# Patient Record
Sex: FEMALE | Race: WHITE | NOT HISPANIC OR LATINO | Employment: OTHER | URBAN - METROPOLITAN AREA
[De-identification: names, ages, dates, MRNs, and addresses within clinical notes are randomized per-mention and may not be internally consistent; named-entity substitution may affect disease eponyms.]

---

## 2017-01-03 ENCOUNTER — COMMUNICATION - HEALTHEAST (OUTPATIENT)
Dept: PALLIATIVE MEDICINE | Facility: OTHER | Age: 57
End: 2017-01-03

## 2017-01-03 DIAGNOSIS — G89.4 CHRONIC PAIN SYNDROME: ICD-10-CM

## 2017-01-27 ENCOUNTER — OFFICE VISIT (OUTPATIENT)
Dept: PSYCHIATRY | Facility: CLINIC | Age: 57
End: 2017-01-27
Attending: PSYCHIATRY & NEUROLOGY
Payer: MEDICARE

## 2017-01-27 VITALS
HEART RATE: 83 BPM | BODY MASS INDEX: 35.17 KG/M2 | DIASTOLIC BLOOD PRESSURE: 83 MMHG | WEIGHT: 221.2 LBS | SYSTOLIC BLOOD PRESSURE: 143 MMHG

## 2017-01-27 DIAGNOSIS — F06.30 MOOD DISORDER IN CONDITIONS CLASSIFIED ELSEWHERE: ICD-10-CM

## 2017-01-27 DIAGNOSIS — G47.00 INSOMNIA, UNSPECIFIED: Primary | ICD-10-CM

## 2017-01-27 PROCEDURE — 99212 OFFICE O/P EST SF 10 MIN: CPT | Mod: ZF

## 2017-01-27 NOTE — NURSING NOTE
Chief Complaint   Patient presents with     Recheck Medication     Major depressive disorder     Reviewed allergies, smoking status, and pharmacy preference  Administered abuse screening questions   Obtained weight, blood pressure and heart rate
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
## 2017-02-13 ENCOUNTER — COMMUNICATION - HEALTHEAST (OUTPATIENT)
Dept: PALLIATIVE MEDICINE | Facility: OTHER | Age: 57
End: 2017-02-13

## 2017-02-13 DIAGNOSIS — G89.4 CHRONIC PAIN SYNDROME: ICD-10-CM

## 2017-02-21 ENCOUNTER — HOSPITAL ENCOUNTER (OUTPATIENT)
Dept: PALLIATIVE MEDICINE | Facility: OTHER | Age: 57
Discharge: HOME | End: 2017-02-21
Attending: PHYSICIAN ASSISTANT

## 2017-02-21 DIAGNOSIS — M50.30 DEGENERATION OF CERVICAL INTERVERTEBRAL DISC: ICD-10-CM

## 2017-02-21 DIAGNOSIS — G89.4 CHRONIC PAIN SYNDROME: ICD-10-CM

## 2017-02-21 DIAGNOSIS — M51.379 DEGENERATION OF LUMBAR OR LUMBOSACRAL INTERVERTEBRAL DISC: ICD-10-CM

## 2017-02-21 DIAGNOSIS — Z79.899 MEDICAL CANNABIS USE: ICD-10-CM

## 2017-02-21 DIAGNOSIS — F11.20 OPIOID TYPE DEPENDENCE, CONTINUOUS (H): ICD-10-CM

## 2017-02-21 ASSESSMENT — MIFFLIN-ST. JEOR: SCORE: 1569.46

## 2017-03-09 ENCOUNTER — TELEPHONE (OUTPATIENT)
Dept: PSYCHIATRY | Facility: CLINIC | Age: 57
End: 2017-03-09

## 2017-03-09 DIAGNOSIS — G47.00 INSOMNIA, UNSPECIFIED: ICD-10-CM

## 2017-03-09 NOTE — TELEPHONE ENCOUNTER
Last appt:  1/27/17  RTC: 3 mos  Can: 4/28/17 & 4/21/17 (cancelled by provider)  No show: none  Pen: 5/5/17    Rec'd refill request for Ambien 10 mg QHS PRN from [facility], 90 d/s last filled 12/13/16.    Msg routed to provider for auth to refill a 90 d/s of this medication.

## 2017-03-10 RX ORDER — ZOLPIDEM TARTRATE 10 MG/1
10 TABLET ORAL
Qty: 90 TABLET | Refills: 0
Start: 2017-03-10 | End: 2017-08-04

## 2017-03-10 NOTE — TELEPHONE ENCOUNTER
Dieter Gayle MD Bove, Michelle, RN        Caller: Unspecified (Yesterday,  5:25 PM)                      Yes, OK to refill Ambien for 90 d/s.  Thanks.

## 2017-03-29 ENCOUNTER — COMMUNICATION - HEALTHEAST (OUTPATIENT)
Dept: PALLIATIVE MEDICINE | Facility: OTHER | Age: 57
End: 2017-03-29

## 2017-03-29 DIAGNOSIS — G89.4 CHRONIC PAIN SYNDROME: ICD-10-CM

## 2017-04-19 ENCOUNTER — HOSPITAL ENCOUNTER (OUTPATIENT)
Dept: PALLIATIVE MEDICINE | Facility: OTHER | Age: 57
Discharge: HOME | End: 2017-04-19
Attending: PHYSICIAN ASSISTANT

## 2017-04-19 DIAGNOSIS — G47.01 INSOMNIA SECONDARY TO CHRONIC PAIN: ICD-10-CM

## 2017-04-19 DIAGNOSIS — G89.4 CHRONIC PAIN SYNDROME: ICD-10-CM

## 2017-04-19 DIAGNOSIS — G03.9 ARACHNOIDITIS: ICD-10-CM

## 2017-04-19 DIAGNOSIS — F11.20 OPIOID TYPE DEPENDENCE, CONTINUOUS (H): ICD-10-CM

## 2017-04-19 DIAGNOSIS — Z79.899 MEDICAL CANNABIS USE: ICD-10-CM

## 2017-04-19 DIAGNOSIS — G89.29 INSOMNIA SECONDARY TO CHRONIC PAIN: ICD-10-CM

## 2017-04-19 ASSESSMENT — MIFFLIN-ST. JEOR: SCORE: 1569.46

## 2017-05-05 ENCOUNTER — OFFICE VISIT (OUTPATIENT)
Dept: PSYCHIATRY | Facility: CLINIC | Age: 57
End: 2017-05-05
Attending: PSYCHIATRY & NEUROLOGY
Payer: MEDICARE

## 2017-05-05 VITALS
DIASTOLIC BLOOD PRESSURE: 72 MMHG | BODY MASS INDEX: 34.53 KG/M2 | WEIGHT: 217.2 LBS | SYSTOLIC BLOOD PRESSURE: 138 MMHG | HEART RATE: 71 BPM

## 2017-05-05 DIAGNOSIS — F33.42 MAJOR DEPRESSIVE DISORDER, RECURRENT, IN FULL REMISSION (H): Primary | ICD-10-CM

## 2017-05-05 DIAGNOSIS — G47.00 INSOMNIA, UNSPECIFIED: ICD-10-CM

## 2017-05-05 PROCEDURE — 99212 OFFICE O/P EST SF 10 MIN: CPT | Mod: ZF

## 2017-05-05 RX ORDER — TRAZODONE HYDROCHLORIDE 50 MG/1
50-150 TABLET, FILM COATED ORAL
Qty: 270 TABLET | Refills: 3 | Status: SHIPPED | OUTPATIENT
Start: 2017-05-05 | End: 2017-10-24

## 2017-05-05 RX ORDER — SERTRALINE HYDROCHLORIDE 100 MG/1
200 TABLET, FILM COATED ORAL DAILY
Qty: 180 TABLET | Refills: 1 | Status: SHIPPED | OUTPATIENT
Start: 2017-05-05 | End: 2017-11-09
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
## 2017-05-05 NOTE — MR AVS SNAPSHOT
After Visit Summary   5/5/2017    Keshia Arellano    MRN: [id number]           Patient Information     Date Of Birth          1960        Visit Information        Provider Department      5/5/2017 3:40 PM Dieter Gayle MD Psychiatry Clinic        Today's Diagnoses     Major depressive disorder, recurrent, mild    -  1    Insomnia, unspecified           Follow-ups after your visit        Follow-up notes from your care team     Return in about 3 months (around 8/5/2017).      Who to contact     Please call your clinic at [phone] to:    Ask questions about your health    Make or cancel appointments    Discuss your medicines    Learn about your test results    Speak to your doctor   If you have compliments or concerns about an experience at your clinic, or if you wish to file a complaint, please contact [facility] Patient Relations at [phone] or email us at [email or web address]         Additional Information About Your Visit        MyChart Information     F[employer] gives you secure access to your electronic health record. If you see a primary care provider, you can also send messages to your care team and make appointments. If you have questions, please call your primary care clinic.  If you do not have a primary care provider, please call [phone] and they will assist you.      [employer] is an electronic gateway that provides easy, online access to your medical records. With [employer], you can request a clinic appointment, read your test results, renew a prescription or communicate with your care team.     To access your existing account, please contact your [facility] or call [phone] for assistance.        Care EveryWhere ID     This is your Care EveryWhere ID. This could be used by other organizations to access your [place] medical records  [id number]        Your Vitals Were     Pulse BMI (Body Mass Index)                 71 34.53 kg/m2           Blood Pressure from Last 3 Encounters:   05/05/17 138/72   01/27/17 143/83   07/22/16 153/80    Weight from Last 3 Encounters:   05/05/17 98.5 kg (217 lb 3.2 oz)   01/27/17 100.3 kg (221 lb 3.2 oz)   07/22/16 107.1 kg (236 lb 3.2 oz)              Today, you had the following     No orders found for display         Today's Medication Changes          These changes are accurate as of: 5/5/17 11:59 PM.  If you have any questions, ask your nurse or doctor.               These medicines have changed or have updated prescriptions.        Dose/Directions    traZODone 50 MG tablet   Commonly known as:  DESYREL   This may have changed:    - how much to take  - when to take this  - reasons to take this   Used for:  Insomnia, unspecified   Changed by:  Dieter Gayle MD        Dose:   mg   Take 1-3 tablets ( mg) by mouth nightly as needed for sleep   Quantity:  270 tablet   Refills:  3         Stop taking these medicines if you haven't already. Please contact your care team if you have questions.     FLEXERIL 10 MG tablet   Generic drug:  cyclobenzaprine   Stopped by:  Dieter Gayle MD                Where to get your medicines      These medications were sent to [facility] - [place], [place] - [address] [facility] [address]  [address] [facility] [address], [place] [address]     Phone:  [phone]     sertraline 100 MG tablet    traZODone 50 MG tablet                Primary Care Provider Office Phone # Fax #    Janie Farr [phone]       [facility] [address]  [facility] [address]        Thank you!     Thank you for choosing PSYCHIATRY CLINIC  for your care. Our goal is always to provide you with excellent care. Hearing back from our patients is one way we can continue to improve our services. Please take a few minutes to complete the written survey that you may receive in the mail after your visit  with us. Thank you!             Your Updated Medication List - Protect others around you: Learn how to safely use, store and throw away your medicines at www.disposemymeds.org.          This list is accurate as of: 5/5/17 11:59 PM.  Always use your most recent med list.                   Brand Name Dispense Instructions for use    ACIDOPHILUS PO      1 tablet daily       baclofen 10 MG tablet    LIORESAL    270 tablet    Take 1 tablet (10 mg) by mouth 3 times daily as needed for muscle spasms       CHLORTHALIDONE PO      Take 12.5 mg by mouth daily       Cholecalciferol 4000 UNITS Tabs      Take 4,000 Units by mouth daily.       fish oil-omega-3 fatty acids 1000 MG capsule      Take 2 g by mouth daily.       LISINOPRIL PO      Take 5 mg by mouth daily       * morphine 15 MG 12 hr tablet    MS CONTIN     Take 15 mg by mouth nightly as needed.       * morphine 15 MG IR tablet    MSIR    1 tablet    Take 0.5-1 tablets (7.5-15 mg) by mouth 4 times daily as needed       order for DME     1 Box    Equipment being ordered: Full spectrum 10,000 lux light box (Procedure code ): use 30 min every morning and afternoon in fall and winter months.       * other medical supplies      BluLight Therapy       priLOSEC 20 MG CR capsule   Generic drug:  omeprazole      1 CAPSULE DAILY       sertraline 100 MG tablet    ZOLOFT    180 tablet    Take 2 tablets (200 mg) by mouth daily       traZODone 50 MG tablet    DESYREL    270 tablet    Take 1-3 tablets ( mg) by mouth nightly as needed for sleep       * UNABLE TO FIND      MEDICATION NAME: omega-6       vitamin D 09241 UNIT capsule    ERGOCALCIFEROL    4 capsule    Take 1 capsule by mouth every 7 days.       zolpidem 10 MG tablet    AMBIEN    90 tablet    Take 1 tablet (10 mg) by mouth nightly as needed       * Notice:  This list has 4 medication(s) that are the same as other medications prescribed for you. Read the directions carefully, and ask your doctor or other care  provider to review them with you.

## 2017-05-09 ASSESSMENT — PATIENT HEALTH QUESTIONNAIRE - PHQ9: SUM OF ALL RESPONSES TO PHQ QUESTIONS 1-9: 14

## 2017-06-02 ENCOUNTER — COMMUNICATION - HEALTHEAST (OUTPATIENT)
Dept: PALLIATIVE MEDICINE | Facility: OTHER | Age: 57
End: 2017-06-02

## 2017-06-02 DIAGNOSIS — G89.4 CHRONIC PAIN SYNDROME: ICD-10-CM

## 2017-06-14 ENCOUNTER — HOSPITAL ENCOUNTER (OUTPATIENT)
Dept: PALLIATIVE MEDICINE | Facility: OTHER | Age: 57
Discharge: HOME | End: 2017-06-14
Attending: PHYSICIAN ASSISTANT

## 2017-06-14 DIAGNOSIS — M50.30 DEGENERATION OF CERVICAL INTERVERTEBRAL DISC: ICD-10-CM

## 2017-06-14 DIAGNOSIS — G03.9 ARACHNOIDITIS: ICD-10-CM

## 2017-06-14 DIAGNOSIS — F11.20 OPIOID TYPE DEPENDENCE, CONTINUOUS (H): ICD-10-CM

## 2017-06-14 DIAGNOSIS — G89.4 CHRONIC PAIN SYNDROME: ICD-10-CM

## 2017-06-14 ASSESSMENT — MIFFLIN-ST. JEOR: SCORE: 1552.45

## 2017-06-25 ENCOUNTER — TELEPHONE (OUTPATIENT)
Dept: PSYCHIATRY | Facility: CLINIC | Age: 57
End: 2017-06-25

## 2017-06-25 NOTE — TELEPHONE ENCOUNTER
"On 6/6/2017 the patient signed an SASHA authorizing the release of medical recors (\"excluding Psychotherapy notes) from [facility] to [employer].  I sent the document to Medical Records via the [employer] system on 6/25-26 2017.Crissy Brunner/ANDRES    "

## 2017-07-31 ENCOUNTER — RECORDS - HEALTHEAST (OUTPATIENT)
Dept: ADMINISTRATIVE | Facility: OTHER | Age: 57
End: 2017-07-31

## 2017-08-04 ENCOUNTER — OFFICE VISIT (OUTPATIENT)
Dept: PSYCHIATRY | Facility: CLINIC | Age: 57
End: 2017-08-04
Attending: PSYCHIATRY & NEUROLOGY
Payer: MEDICARE

## 2017-08-04 VITALS
HEART RATE: 76 BPM | BODY MASS INDEX: 35.01 KG/M2 | WEIGHT: 220.2 LBS | DIASTOLIC BLOOD PRESSURE: 80 MMHG | SYSTOLIC BLOOD PRESSURE: 145 MMHG

## 2017-08-04 DIAGNOSIS — F33.41 MAJOR DEPRESSIVE DISORDER, RECURRENT EPISODE, IN PARTIAL REMISSION (H): Primary | ICD-10-CM

## 2017-08-04 PROCEDURE — 99212 OFFICE O/P EST SF 10 MIN: CPT | Mod: ZF

## 2017-08-04 NOTE — MR AVS SNAPSHOT
After Visit Summary   8/4/2017    Keshia Arellano    MRN: [id number]           Patient Information     Date Of Birth          1960        Visit Information        Provider Department      8/4/2017 2:45 PM Dieter Gayle MD Psychiatry Clinic        Today's Diagnoses     Major depressive disorder, recurrent episode, in partial remission (H)    -  1       Follow-ups after your visit        Follow-up notes from your care team     Return in about 3 months (around 11/4/2017).      Your next 10 appointments already scheduled     Nov 09, 2017  1:15 PM [facility]   Adult Med Follow UP with Dieter Gayle MD   Psychiatry Clinic ([facility])    [address]  [address] [facility] [id number] [phone]              Who to contact     Please call your clinic at [phone] to:    Ask questions about your health    Make or cancel appointments    Discuss your medicines    Learn about your test results    Speak to your doctor   If you have compliments or concerns about an experience at your clinic, or if you wish to file a complaint, please contact [facility] Patient Relations at [phone] or email us at [email or web address]         Additional Information About Your Visit        MyChart Information     [employer] gives you secure access to your electronic health record. If you see a primary care provider, you can also send messages to your care team and make appointments. If you have questions, please call your primary care clinic.  If you do not have a primary care provider, please call [phone] and they will assist you.      [employer] is an electronic gateway that provides easy, online access to your medical records. With [employer], you can request a clinic appointment, read your test results, renew a prescription or communicate with your care team.     To access your existing account, please contact your [facility]  [place] [facility] or call [phone] for assistance.        Care EveryWhere ID     This is your Care EveryWhere ID. This could be used by other organizations to access your [place] medical records  [id number]        Your Vitals Were     Pulse BMI (Body Mass Index)                76 35.01 kg/m2           Blood Pressure from Last 3 Encounters:   08/04/17 145/80   05/05/17 138/72   01/27/17 143/83    Weight from Last 3 Encounters:   08/04/17 99.9 kg (220 lb 3.2 oz)   05/05/17 98.5 kg (217 lb 3.2 oz)   01/27/17 100.3 kg (221 lb 3.2 oz)              Today, you had the following     No orders found for display         Today's Medication Changes          These changes are accurate as of: 8/4/17 11:59 PM.  If you have any questions, ask your nurse or doctor.               Stop taking these medicines if you haven't already. Please contact your care team if you have questions.     zolpidem 10 MG tablet   Commonly known as:  AMBIEN   Stopped by:  Dieter Gayle MD                    Primary Care Provider Office Phone # Fax #    Janie Melanie Farr [phone]       [facility] [address]  [facility] [address]        Equal Access to Services     YENNY AMBRIZ AH: Hadii aad ku hadasho Soomaali, waaxda luqadaha, qaybta kaalmada adeegyada, waxay medinain hayemil sims. So [facility] [phone].    ATENCIÓN: Si habla español, tiene a cotton disposición servicios gratuitos de asistencia lingüística. Llame al [phone].    We comply with applicable federal civil rights laws and [place] laws. We do not discriminate on the basis of race, color, national origin, age, disability sex, sexual orientation or gender identity.            Thank you!     Thank you for choosing PSYCHIATRY CLINIC  for your care. Our goal is always to provide you with excellent care. Hearing back from our patients is one way we can continue to improve our services. Please take a few minutes to  complete the written survey that you may receive in the mail after your visit with us. Thank you!             Your Updated Medication List - Protect others around you: Learn how to safely use, store and throw away your medicines at www.disposemymeds.org.          This list is accurate as of: 8/4/17 11:59 PM.  Always use your most recent med list.                   Brand Name Dispense Instructions for use Diagnosis    ACIDOPHILUS PO      1 tablet daily        baclofen 10 MG tablet    LIORESAL    270 tablet    Take 1 tablet (10 mg) by mouth 3 times daily as needed for muscle spasms    Chronic pain       CHLORTHALIDONE PO      Take 12.5 mg by mouth daily        Cholecalciferol 4000 UNITS Tabs      Take 4,000 Units by mouth daily.        fish oil-omega-3 fatty acids 1000 MG capsule      Take 2 g by mouth daily.        LISINOPRIL PO      Take 5 mg by mouth daily        * morphine 15 MG 12 hr tablet    MS CONTIN     Take 15 mg by mouth nightly as needed.        * morphine 15 MG IR tablet    MSIR    1 tablet    Take 0.5-1 tablets (7.5-15 mg) by mouth 4 times daily as needed    Chronic pain       order for DME     1 Box    Equipment being ordered: Full spectrum 10,000 lux light box (Procedure code ): use 30 min every morning and afternoon in fall and winter months.    Seasonal affective disorder (H)       * other medical supplies      BluLight Therapy        priLOSEC 20 MG CR capsule   Generic drug:  omeprazole      1 CAPSULE DAILY        sertraline 100 MG tablet    ZOLOFT    180 tablet    Take 2 tablets (200 mg) by mouth daily    Major depressive disorder, recurrent, in full remission (H)       traZODone 50 MG tablet    DESYREL    270 tablet    Take 1-3 tablets ( mg) by mouth nightly as needed for sleep    Insomnia, unspecified       * UNABLE TO FIND      MEDICATION NAME: omega-6        vitamin D 75539 UNIT capsule    ERGOCALCIFEROL    4 capsule    Take 1 capsule by mouth every 7 days.    Vitamin D deficiency        * Notice:  This list has 4 medication(s) that are the same as other medications prescribed for you. Read the directions carefully, and ask your doctor or other care provider to review them with you.
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
## 2017-08-07 ASSESSMENT — PATIENT HEALTH QUESTIONNAIRE - PHQ9: SUM OF ALL RESPONSES TO PHQ QUESTIONS 1-9: 13

## 2017-08-09 ENCOUNTER — HOSPITAL ENCOUNTER (OUTPATIENT)
Dept: PALLIATIVE MEDICINE | Facility: OTHER | Age: 57
Discharge: HOME | End: 2017-08-09
Attending: PHYSICIAN ASSISTANT

## 2017-08-09 DIAGNOSIS — M25.50 PAIN IN JOINT, MULTIPLE SITES: ICD-10-CM

## 2017-08-09 DIAGNOSIS — F11.20 OPIOID TYPE DEPENDENCE, CONTINUOUS (H): ICD-10-CM

## 2017-08-09 DIAGNOSIS — Z79.899 MEDICAL CANNABIS USE: ICD-10-CM

## 2017-08-09 DIAGNOSIS — G03.9 ARACHNOIDITIS: ICD-10-CM

## 2017-08-09 DIAGNOSIS — G89.4 CHRONIC PAIN SYNDROME: ICD-10-CM

## 2017-08-09 ASSESSMENT — MIFFLIN-ST. JEOR: SCORE: 1556.98

## 2017-08-17 ENCOUNTER — TELEPHONE (OUTPATIENT)
Dept: PSYCHIATRY | Facility: CLINIC | Age: 57
End: 2017-08-17

## 2017-08-17 NOTE — TELEPHONE ENCOUNTER
"Last appt: 8/4/17  Pend: 11/9/17    Writer does not see SASHA on file for Dr. Ibrahim.  Uncertain where he is calling from, possibly with insurance company.    Writer attempted to reach Dr. Ibrahim at phone below however spoke with .  Was informed that Dr. Ibrahim is out of clinic today however will be in \"all day\" tomorrow.  Inquired about Dr. Ibrahim's relation to pt and was informed that he is a rehab doctor in private practice that is performing an independent review on patient.  Clarified that Dr. Gaspar is not the treating provider and that Dr. Gayle is currently seeing this pt.  Writer stated would forward msg to Dr. Gayle.  "

## 2017-08-17 NOTE — TELEPHONE ENCOUNTER
----- Message from Sharlene Mauro sent at 8/17/2017 11:08 AM CDT -----  Irwin/Annie Ibrahim is caller. He asked to speak to Dr Gaspar. He would like to talk about pt's functionality.     [phone]

## 2017-08-24 NOTE — TELEPHONE ENCOUNTER
Manasa Ramirez MD Bove, Michelle, RN        Caller: Unspecified (1 week ago,  3:04 PM)                     Barry Valencia - Can you (or someone) call this person back and let them know Dr. Gayle is out for a few weeks. They can try to connect after Dr. Gayle is back.     Thanks,   Manasa Jimenez'd faxed letter from Dr. Ibrahim on behalf of [facility] regarding pt's disability claim.  He requests provider's review of his recommendations and signature on attached letter.    Called Dr. Ibrahim's office with update that Dr. Gayle is out of clinic until 9/12    Routed to Dr. Gayle as KARLOS

## 2017-09-12 NOTE — TELEPHONE ENCOUNTER
Dieter Gayle MD Bove, Michelle, RN        Caller: Unspecified (3 weeks ago)                     I signed the letter and faxed it back today.  Thanks.

## 2017-09-14 ENCOUNTER — COMMUNICATION - HEALTHEAST (OUTPATIENT)
Dept: PALLIATIVE MEDICINE | Facility: CLINIC | Age: 57
End: 2017-09-14

## 2017-09-14 DIAGNOSIS — G89.4 CHRONIC PAIN SYNDROME: ICD-10-CM

## 2017-10-04 ENCOUNTER — HOSPITAL ENCOUNTER (OUTPATIENT)
Dept: PALLIATIVE MEDICINE | Facility: OTHER | Age: 57
Discharge: HOME | End: 2017-10-04
Attending: PHYSICIAN ASSISTANT

## 2017-10-04 DIAGNOSIS — F11.20 OPIOID TYPE DEPENDENCE, CONTINUOUS (H): ICD-10-CM

## 2017-10-04 DIAGNOSIS — Z79.899 MEDICAL CANNABIS USE: ICD-10-CM

## 2017-10-04 DIAGNOSIS — G03.9 ARACHNOIDITIS: ICD-10-CM

## 2017-10-04 DIAGNOSIS — M50.30 DEGENERATION OF CERVICAL INTERVERTEBRAL DISC: ICD-10-CM

## 2017-10-04 DIAGNOSIS — G89.4 CHRONIC PAIN SYNDROME: ICD-10-CM

## 2017-10-04 ASSESSMENT — MIFFLIN-ST. JEOR: SCORE: 1552.45

## 2017-11-09 ENCOUNTER — OFFICE VISIT (OUTPATIENT)
Dept: PSYCHIATRY | Facility: CLINIC | Age: 57
End: 2017-11-09
Attending: PSYCHIATRY & NEUROLOGY
Payer: MEDICARE

## 2017-11-09 VITALS
SYSTOLIC BLOOD PRESSURE: 127 MMHG | WEIGHT: 211 LBS | BODY MASS INDEX: 33.55 KG/M2 | DIASTOLIC BLOOD PRESSURE: 80 MMHG | HEART RATE: 73 BPM

## 2017-11-09 DIAGNOSIS — F33.42 MAJOR DEPRESSIVE DISORDER, RECURRENT, IN FULL REMISSION (H): Primary | ICD-10-CM

## 2017-11-09 PROCEDURE — 99212 OFFICE O/P EST SF 10 MIN: CPT | Mod: ZF

## 2017-11-09 RX ORDER — SERTRALINE HYDROCHLORIDE 100 MG/1
200 TABLET, FILM COATED ORAL DAILY
Qty: 180 TABLET | Refills: 1 | Status: SHIPPED | OUTPATIENT
Start: 2017-11-09 | End: 2018-02-01 | Stop reason: DRUGHIGH

## 2017-11-09 ASSESSMENT — PATIENT HEALTH QUESTIONNAIRE - PHQ9: SUM OF ALL RESPONSES TO PHQ QUESTIONS 1-9: 13
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
## 2017-11-09 NOTE — MR AVS SNAPSHOT
After Visit Summary   11/9/2017    Keshia Arellano    MRN: [id number]           Patient Information     Date Of Birth          1960        Visit Information        Provider Department      11/9/2017 1:15 PM Dieter Gayle MD Psychiatry Clinic        Today's Diagnoses     Major depressive disorder, recurrent, mild    -  1       Follow-ups after your visit        Your next 10 appointments already scheduled     Jan 11, 2018  3:15 PM [facility]   Adult Med Follow UP with Dieter Gayle MD   Psychiatry Clinic ([facility])    [address]  [address] [facility] [id number] [phone]              Who to contact     Please call your clinic at [phone] to:    Ask questions about your health    Make or cancel appointments    Discuss your medicines    Learn about your test results    Speak to your doctor   If you have compliments or concerns about an experience at your clinic, or if you wish to file a complaint, please contact [facility] Patient Relations at [phone] or email us at [email or web address]         Additional Information About Your Visit        MyChart Information     [employer] gives you secure access to your electronic health record. If you see a primary care provider, you can also send messages to your care team and make appointments. If you have questions, please call your primary care clinic.  If you do not have a primary care provider, please call [phone] and they will assist you.      [employer] is an electronic gateway that provides easy, online access to your medical records. With [employer], you can request a clinic appointment, read your test results, renew a prescription or communicate with your care team.     To access your existing account, please contact your [facility] or call [phone] for assistance.        Care EveryWhere ID     This is your Care  EveryWhere ID. This could be used by other organizations to access your [facility] medical records  [id number]        Your Vitals Were     Pulse BMI (Body Mass Index)                73 33.55 kg/m2           Blood Pressure from Last 3 Encounters:   11/09/17 127/80   08/04/17 145/80   05/05/17 138/72    Weight from Last 3 Encounters:   11/09/17 95.7 kg (211 lb)   08/04/17 99.9 kg (220 lb 3.2 oz)   05/05/17 98.5 kg (217 lb 3.2 oz)              Today, you had the following     No orders found for display         Where to get your medicines      These medications were sent to [facility] - [place] - [address]  [address], [place] [address]     Phone:  [phone]     sertraline 100 MG tablet          Primary Care Provider Office Phone # Fax #    Janie Melanie Farr [phone]       [facility] [address]  [facility] [address]        Equal Access to Services     [facility]: Hadii aad ku hadasho Soomaali, waaxda luqadaha, qaybta kaalmada adekoffiyakelly, riddhi matos . So [facility] [phone].    ATENCIÓN: Si habla español, tiene a cotton disposición servicios gratuitos de asistencia lingüística. Chang al [phone].    We comply with applicable federal civil rights laws and [place] laws. We do not discriminate on the basis of race, color, national origin, age, disability, sex, sexual orientation, or gender identity.            Thank you!     Thank you for choosing PSYCHIATRY CLINIC  for your care. Our goal is always to provide you with excellent care. Hearing back from our patients is one way we can continue to improve our services. Please take a few minutes to complete the written survey that you may receive in the mail after your visit with us. Thank you!             Your Updated Medication List - Protect others around you: Learn how to safely use, store and throw away your medicines at  www.disposemymeds.org.          This list is accurate as of: 11/9/17 11:59 PM.  Always use your most recent med list.                   Brand Name Dispense Instructions for use Diagnosis    ACIDOPHILUS PO      1 tablet daily        baclofen 10 MG tablet    LIORESAL    270 tablet    Take 1 tablet (10 mg) by mouth 3 times daily as needed for muscle spasms    Chronic pain       CHLORTHALIDONE PO      Take 12.5 mg by mouth daily        Cholecalciferol 4000 UNITS Tabs      Take 4,000 Units by mouth daily.        fish oil-omega-3 fatty acids 1000 MG capsule      Take 2 g by mouth daily.        LISINOPRIL PO      Take 5 mg by mouth daily        * morphine 15 MG 12 hr tablet    MS CONTIN     Take 15 mg by mouth nightly as needed.        * morphine 15 MG IR tablet    MSIR    1 tablet    Take 0.5-1 tablets (7.5-15 mg) by mouth 4 times daily as needed    Chronic pain       order for DME     1 Box    Equipment being ordered: Full spectrum 10,000 lux light box (Procedure code ): use 30 min every morning and afternoon in fall and winter months.    Seasonal affective disorder (H)       * other medical supplies      BluLight Therapy        priLOSEC 20 MG CR capsule   Generic drug:  omeprazole      1 CAPSULE DAILY        sertraline 100 MG tablet    ZOLOFT    180 tablet    Take 2 tablets (200 mg) by mouth daily    Major depressive disorder, recurrent, in full remission (H)       traZODone 50 MG tablet    DESYREL    360 tablet    Take 1-4 tablets ( mg) by mouth nightly as needed for sleep    Insomnia       * UNABLE TO FIND      MEDICATION NAME: omega-6        vitamin D 54203 UNIT capsule    ERGOCALCIFEROL    4 capsule    Take 1 capsule by mouth every 7 days.    Vitamin D deficiency       * Notice:  This list has 4 medication(s) that are the same as other medications prescribed for you. Read the directions carefully, and ask your doctor or other care provider to review them with you.

## 2017-11-27 ENCOUNTER — COMMUNICATION - HEALTHEAST (OUTPATIENT)
Dept: PALLIATIVE MEDICINE | Facility: OTHER | Age: 57
End: 2017-11-27

## 2017-11-27 DIAGNOSIS — G89.4 CHRONIC PAIN SYNDROME: ICD-10-CM

## 2017-11-29 ENCOUNTER — HOSPITAL ENCOUNTER (OUTPATIENT)
Dept: PALLIATIVE MEDICINE | Facility: OTHER | Age: 57
Discharge: HOME | End: 2017-11-29
Attending: PHYSICIAN ASSISTANT

## 2017-11-29 DIAGNOSIS — F11.20 OPIOID TYPE DEPENDENCE, CONTINUOUS (H): ICD-10-CM

## 2017-11-29 DIAGNOSIS — G03.9 ARACHNOIDITIS: ICD-10-CM

## 2017-11-29 DIAGNOSIS — G89.4 CHRONIC PAIN SYNDROME: ICD-10-CM

## 2017-11-29 ASSESSMENT — MIFFLIN-ST. JEOR: SCORE: 1484.41

## 2017-12-26 ENCOUNTER — COMMUNICATION - HEALTHEAST (OUTPATIENT)
Dept: PALLIATIVE MEDICINE | Facility: CLINIC | Age: 57
End: 2017-12-26

## 2017-12-26 DIAGNOSIS — G89.4 CHRONIC PAIN SYNDROME: ICD-10-CM

## 2018-01-15 ENCOUNTER — HOSPITAL ENCOUNTER (INPATIENT)
Facility: CLINIC | Age: 58
LOS: 2 days | Discharge: HOME | DRG: 885 | End: 2018-01-18
Attending: EMERGENCY MEDICINE | Admitting: PSYCHIATRY & NEUROLOGY
Payer: MEDICARE

## 2018-01-15 DIAGNOSIS — F43.22 ADJUSTMENT DISORDER WITH ANXIOUS MOOD: ICD-10-CM

## 2018-01-15 DIAGNOSIS — F43.10 POSTTRAUMATIC STRESS DISORDER: ICD-10-CM

## 2018-01-15 DIAGNOSIS — I10 HYPERTENSION, UNSPECIFIED TYPE: ICD-10-CM

## 2018-01-15 DIAGNOSIS — F33.2 SEVERE EPISODE OF RECURRENT MAJOR DEPRESSIVE DISORDER, WITHOUT PSYCHOTIC FEATURES (H): Primary | ICD-10-CM

## 2018-01-15 PROCEDURE — 99284 EMERGENCY DEPT VISIT MOD MDM: CPT | Mod: Z6 | Performed by: EMERGENCY MEDICINE

## 2018-01-15 PROCEDURE — 99285 EMERGENCY DEPT VISIT HI MDM: CPT | Mod: 25 | Performed by: EMERGENCY MEDICINE

## 2018-01-15 NOTE — IP AVS SNAPSHOT
[facility]    [address]    [place] [address]    Phone:  [phone]                                       After Visit Summary   1/15/2018    Keshia Arellano    MRN: [id number]           After Visit Summary Signature Page     I have received my discharge instructions, and my questions have been answered. I have discussed any challenges I see with this plan with the nurse or doctor.    ..........................................................................................................................................  Patient/Patient Representative Signature      ..........................................................................................................................................  Patient Representative Print Name and Relationship to Patient    ..................................................               ................................................  Date                                            Time    ..........................................................................................................................................  Reviewed by Signature/Title    ...................................................              ..............................................  Date                                                            Time

## 2018-01-15 NOTE — IP AVS SNAPSHOT
MRN:[id number]                      After Visit Summary   1/15/2018    Keshia Arellano    MRN: [id number]           Thank you!     Thank you for choosing [facility] for your care. Our goal is always to provide you with excellent care.        Patient Information     Date Of Birth          1960        Designated Caregiver       Most Recent Value    Caregiver    Will someone help with your care after discharge? no      About your hospital stay     You were admitted on:  January 16, 2018 You last received care in the:  UR 3B STP    You were discharged on:  January 18, 2018       Who to Call     For medical emergencies, please call 911.  For non-urgent questions about your medical care, please call your primary care provider or clinic, [phone]          Attending Provider     Provider Specialty    Tha Hopkins MD Emergency Medicine    Austin Tang MD Psychiatry       Primary Care Provider Office Phone # Fax #    Janie Farr [phone]      Follow-up Appointments     Follow Up ([facility])       Follow up with primary care provider, Janie Farr, within 7 days to evaluate medication change and for hospital follow- up.  No follow up labs or test are needed. Blood pressure recheck.    Follow up with GI for continued w/u of abdominal pain.   Follow up with pain specialist for continued management of chronic pain.       Appointments on Gunpowder and/or Omar Peever (with [facility] or [facility] provider or service). Call [phone] if you haven't heard regarding these appointments within 7 days of discharge.                  Your next 10 appointments already scheduled     Jan 18, 2018  3:15 PM CST   Adult Med Follow UP with Dieter Gayle MD   Psychiatry Clinic ([facility])    [address]  [address] [facility] [id number] [phone]              Further instructions from your care team        Behavioral  Discharge Planning and Instructions      Summary:  You were admitted on 1/15/2018  due to Manic Symptomology.  You were treated by DOMINGA Oakley DNP and discharged on 01/18/2018 from Unit 3BW to Home.      Principal Diagnosis:   Substance Abuse Disorder    Health Care Follow-up Appointments:   Psychiatrist:  Dr. Dieter Gayle - Thursday, January 18th at 3:15 pm  [address] [address],   [place] [address]   Phone: [phone]      Attend all scheduled appointments with your outpatient providers. Call at least 24 hours in advance if you need to reschedule an appointment to ensure continued access to your outpatient providers.   Major Treatments, Procedures and Findings:  You were provided with: a psychiatric assessment, assessed for medical stability, medication evaluation and/or management and medical interventions    Symptoms to Report: feeling more aggressive, increased confusion, losing more sleep, mood getting worse or thoughts of suicide    Early warning signs can include: increased depression or anxiety sleep disturbances increased thoughts or behaviors of suicide or self-harm  increased unusual thinking, such as paranoia or hearing voices    Safety and Wellness:  Take all medicines as directed.  Make no changes unless your doctor suggests them.      Follow treatment recommendations.  Refrain from alcohol and non-prescribed drugs.  If there is a concern for safety, call 911.    Resources:   Crisis Intervention: 867.588.5427 or 586-512-4107 (TTY: 783.418.1895).  Call anytime for help.  National [place] on Mental Illness (www.[place].amrik.org): 692.442.2490 or 590-067-7425.  Suicide Awareness Voices of Education (SAVE) (www.save.org): 942-464-EYTB (5483)  National Suicide Prevention Line (www.mentalhealthmn.org): 843-731-XYVN (9495)  Mental Health Consumer/Survivor Network of [place] (www.mhcsn.net): 495.450.5568 or 437-711-1051  Mental Health Association of [place] (www.mentalhealth.org): 257.780.4957 or  "371.939.4729  Self- Management and Recovery Training., SMART-- Toll free: 292.767.6395  www.meXBT / [employer]  [facility] Crisis Response - Adult [phone]    The treatment team has appreciated the opportunity to work with you.     If you have any questions or concerns our unit number is [phone].  You may be receiving a follow-up phone call within the next three days from a representative from behavioral health.    You have identified the best phone number to reach you as [phone]          Pending Results     No orders found from 1/13/2018 to 1/16/2018.            Admission Information     Date & Time Provider Department Dept. Phone    1/15/2018 Austin Tang MD UR 3B [phone]      Your Vitals Were     Blood Pressure Pulse Temperature Respirations Height Weight    142/73 91 98  F (36.7  C) (Tympanic) 16 1.676 m (5' 6\") 83.9 kg (185 lb)    Pulse Oximetry BMI (Body Mass Index)                97% 29.86 kg/m2          [employer] Information     [employer] gives you secure access to your electronic health record. If you see a primary care provider, you can also send messages to your care team and make appointments. If you have questions, please call your primary care clinic.  If you do not have a primary care provider, please call [phone] and they will assist you.        Care EveryWhere ID     This is your Care EveryWhere ID. This could be used by other organizations to access your [facility] medical records  [id number]        Equal Access to Services     SHYLA AMBRIZ : Hadii yanick fang hadmatyo Socarolinaali, waaxda luqadaha, qaybta kaalmada maikel, riddhi matos . So [facility] [phone].    ATENCIÓN: Si habla español, tiene a cotton disposición servicios gratuitos de asistencia lingüística. Llame al [phone].    We comply with applicable federal civil rights laws and [place] laws. We do not discriminate on the basis of race, color, national origin, age, disability, sex, " sexual orientation, or gender identity.               Review of your medicines      START taking        Dose / Directions    metoprolol tartrate 25 MG tablet   Commonly known as:  LOPRESSOR   Used for:  Hypertension, unspecified type        Dose:  25 mg   Take 1 tablet (25 mg) by mouth 2 times daily   Quantity:  60 tablet   Refills:  0       * OLANZapine 2.5 MG tablet   Commonly known as:  zyPREXA   Used for:  Severe episode of recurrent major depressive disorder, without psychotic features (H)        Dose:  2.5 mg   Take 1 tablet (2.5 mg) by mouth every morning   Quantity:  30 tablet   Refills:  0       * OLANZapine 5 MG tablet   Commonly known as:  zyPREXA   Used for:  Severe episode of recurrent major depressive disorder, without psychotic features (H)        Dose:  5 mg   Take 1 tablet (5 mg) by mouth At Bedtime   Quantity:  30 tablet   Refills:  0       * OLANZapine 5 MG tablet   Commonly known as:  zyPREXA   Used for:  Severe episode of recurrent major depressive disorder, without psychotic features (H)        Dose:  5 mg   Take 1 tablet (5 mg) by mouth 2 times daily as needed (anxiety)   Quantity:  60 tablet   Refills:  0       * Notice:  This list has 3 medication(s) that are the same as other medications prescribed for you. Read the directions carefully, and ask your doctor or other care provider to review them with you.      CONTINUE these medicines which may have CHANGED, or have new prescriptions. If we are uncertain of the size of tablets/capsules you have at home, strength may be listed as something that might have changed.        Dose / Directions    * morphine 15 MG 12 hr tablet   Commonly known as:  MS CONTIN   This may have changed:  Another medication with the same name was changed. Make sure you understand how and when to take each.        Dose:  15 mg   Take 15 mg by mouth 3 times daily as needed   Refills:  0       * morphine 15 MG IR tablet   Commonly known as:  MSIR   This may have changed:   when to take this   Used for:  Chronic pain        Dose:  7.5-15 mg   Take 0.5-1 tablets (7.5-15 mg) by mouth 4 times daily as needed   Quantity:  1 tablet   Refills:  0       * Notice:  This list has 2 medication(s) that are the same as other medications prescribed for you. Read the directions carefully, and ask your doctor or other care provider to review them with you.      CONTINUE these medicines which have NOT CHANGED        Dose / Directions    ACIDOPHILUS PO        1 tablet daily   Refills:  0       CHLORTHALIDONE PO   Indication:  High Blood Pressure Disorder        Dose:  12.5 mg   Take 12.5 mg by mouth daily   Refills:  0       Cholecalciferol 4000 UNITS Tabs        Dose:  4000 Units   Take 4,000 Units by mouth daily.   Refills:  0       fish oil-omega-3 fatty acids 1000 MG capsule        Dose:  2 g   Take 2 g by mouth daily.   Refills:  0       LISINOPRIL PO   Indication:  High Blood Pressure Disorder        Dose:  5 mg   Take 5 mg by mouth daily   Refills:  0       order for DME   Used for:  Seasonal affective disorder (H)        Equipment being ordered: Full spectrum 10,000 lux light box (Procedure code ): use 30 min every morning and afternoon in fall and winter months.   Quantity:  1 Box   Refills:  0       * other medical supplies        BluLight Therapy   Refills:  0       PROTONIX PO        Dose:  40 mg   Take 40 mg by mouth every morning (before breakfast)   Refills:  0       sertraline 100 MG tablet   Commonly known as:  ZOLOFT   Used for:  Major depressive disorder, recurrent, in full remission (H)        Dose:  200 mg   Take 2 tablets (200 mg) by mouth daily   Quantity:  180 tablet   Refills:  1       traZODone 50 MG tablet   Commonly known as:  DESYREL   Used for:  Insomnia        Dose:   mg   Take 1-4 tablets ( mg) by mouth nightly as needed for sleep   Quantity:  360 tablet   Refills:  0       * UNABLE TO FIND        MEDICATION NAME: omega-6   Refills:  0       vitamin D  50947 UNIT capsule   Commonly known as:  ERGOCALCIFEROL   Used for:  Vitamin D deficiency        Dose:  98187 Units   Take 1 capsule by mouth every 7 days.   Quantity:  4 capsule   Refills:  0       * Notice:  This list has 2 medication(s) that are the same as other medications prescribed for you. Read the directions carefully, and ask your doctor or other care provider to review them with you.         Where to get your medicines      These medications were sent to [facility] [place], [place] - [address]  [address] [facility] [address], [facility] [address]     Phone:  [phone]     metoprolol tartrate 25 MG tablet    OLANZapine 2.5 MG tablet    OLANZapine 5 MG tablet    OLANZapine 5 MG tablet                Protect others around you: Learn how to safely use, store and throw away your medicines at www.disposemymeds.org.             Medication List: This is a list of all your medications and when to take them. Check marks below indicate your daily home schedule. Keep this list as a reference.      Medications           Morning Afternoon Evening Bedtime As Needed    ACIDOPHILUS PO   1 tablet daily                                CHLORTHALIDONE PO   Take 12.5 mg by mouth daily                                Cholecalciferol 4000 UNITS Tabs   Take 4,000 Units by mouth daily.   Last time this was given:  4,000 Units on 1/18/2018  8:00 AM                                fish oil-omega-3 fatty acids 1000 MG capsule   Take 2 g by mouth daily.                                LISINOPRIL PO   Take 5 mg by mouth daily   Last time this was given:  5 mg on 1/18/2018  8:00 AM                                metoprolol tartrate 25 MG tablet   Commonly known as:  LOPRESSOR   Take 1 tablet (25 mg) by mouth 2 times daily                                * morphine 15 MG 12 hr tablet   Commonly known as:  MS CONTIN   Take 15 mg by mouth 3 times daily as needed   Last time this was given:  15 mg on 1/18/2018  8:00 AM                                 * morphine 15 MG IR tablet   Commonly known as:  MSIR   Take 0.5-1 tablets (7.5-15 mg) by mouth 4 times daily as needed   Last time this was given:  15 mg on 1/18/2018  8:00 AM                                * OLANZapine 2.5 MG tablet   Commonly known as:  zyPREXA   Take 1 tablet (2.5 mg) by mouth every morning   Last time this was given:  2.5 mg on 1/18/2018  8:00 AM                                * OLANZapine 5 MG tablet   Commonly known as:  zyPREXA   Take 1 tablet (5 mg) by mouth At Bedtime   Last time this was given:  2.5 mg on 1/18/2018  8:00 AM                                * OLANZapine 5 MG tablet   Commonly known as:  zyPREXA   Take 1 tablet (5 mg) by mouth 2 times daily as needed (anxiety)   Last time this was given:  2.5 mg on 1/18/2018  8:00 AM                                order for DME   Equipment being ordered: Full spectrum 10,000 lux light box (Procedure code ): use 30 min every morning and afternoon in fall and winter months.                                * other medical supplies   BluLight Therapy                                PROTONIX PO   Take 40 mg by mouth every morning (before breakfast)   Last time this was given:  40 mg on 1/18/2018  8:00 AM                                sertraline 100 MG tablet   Commonly known as:  ZOLOFT   Take 2 tablets (200 mg) by mouth daily   Last time this was given:  200 mg on 1/18/2018  8:00 AM                                traZODone 50 MG tablet   Commonly known as:  DESYREL   Take 1-4 tablets ( mg) by mouth nightly as needed for sleep   Last time this was given:  200 mg on 1/17/2018  8:38 PM                                * UNABLE TO FIND   MEDICATION NAME: omega-6                                vitamin D 27658 UNIT capsule   Commonly known as:  ERGOCALCIFEROL   Take 1 capsule by mouth every 7 days.                                * Notice:  This list has 7 medication(s) that are the same as other medications prescribed for  you. Read the directions carefully, and ask your doctor or other care provider to review them with you.

## 2018-01-16 ENCOUNTER — TELEPHONE (OUTPATIENT)
Dept: PSYCHIATRY | Facility: CLINIC | Age: 58
End: 2018-01-16

## 2018-01-16 PROBLEM — F43.10 PTSD (POST-TRAUMATIC STRESS DISORDER): Status: ACTIVE | Noted: 2018-01-16

## 2018-01-16 PROBLEM — Z72.89 SELF-INJURIOUS BEHAVIOR: Status: ACTIVE | Noted: 2018-01-16

## 2018-01-16 LAB
ALBUMIN UR-MCNC: NEGATIVE MG/DL
AMPHETAMINES UR QL SCN: NEGATIVE
APPEARANCE UR: CLEAR
BACTERIA #/AREA URNS HPF: ABNORMAL /HPF
BACTERIA SPEC CULT: NORMAL
BARBITURATES UR QL: NEGATIVE
BENZODIAZ UR QL: NEGATIVE
BILIRUB UR QL STRIP: NEGATIVE
CANNABINOIDS UR QL SCN: POSITIVE
COCAINE UR QL: NEGATIVE
COLOR UR AUTO: ABNORMAL
ETHANOL UR QL SCN: NEGATIVE
GLUCOSE UR STRIP-MCNC: NEGATIVE MG/DL
HGB UR QL STRIP: ABNORMAL
KETONES UR STRIP-MCNC: 10 MG/DL
LEUKOCYTE ESTERASE UR QL STRIP: ABNORMAL
NITRATE UR QL: NEGATIVE
OPIATES UR QL SCN: POSITIVE
PH UR STRIP: 5.5 PH (ref 5–7)
RBC #/AREA URNS AUTO: 4 /HPF (ref 0–2)
SOURCE: ABNORMAL
SP GR UR STRIP: 1 (ref 1–1.03)
SPECIMEN SOURCE: NORMAL
SQUAMOUS #/AREA URNS AUTO: 1 /HPF (ref 0–1)
UROBILINOGEN UR STRIP-MCNC: NORMAL MG/DL (ref 0–2)
WBC #/AREA URNS AUTO: 9 /HPF (ref 0–2)

## 2018-01-16 PROCEDURE — 12400007 ZZH R&B MH INTERMEDIATE UMMC

## 2018-01-16 PROCEDURE — 99221 1ST HOSP IP/OBS SF/LOW 40: CPT | Mod: AI | Performed by: NURSE PRACTITIONER

## 2018-01-16 PROCEDURE — A9270 NON-COVERED ITEM OR SERVICE: HCPCS | Mod: GY | Performed by: PHYSICIAN ASSISTANT

## 2018-01-16 PROCEDURE — 93005 ELECTROCARDIOGRAM TRACING: CPT

## 2018-01-16 PROCEDURE — A9270 NON-COVERED ITEM OR SERVICE: HCPCS | Mod: GY | Performed by: EMERGENCY MEDICINE

## 2018-01-16 PROCEDURE — 87086 URINE CULTURE/COLONY COUNT: CPT | Performed by: PSYCHIATRY & NEUROLOGY

## 2018-01-16 PROCEDURE — 90791 PSYCH DIAGNOSTIC EVALUATION: CPT

## 2018-01-16 PROCEDURE — 80307 DRUG TEST PRSMV CHEM ANLYZR: CPT | Performed by: FAMILY MEDICINE

## 2018-01-16 PROCEDURE — 80320 DRUG SCREEN QUANTALCOHOLS: CPT | Performed by: FAMILY MEDICINE

## 2018-01-16 PROCEDURE — A9270 NON-COVERED ITEM OR SERVICE: HCPCS | Mod: GY | Performed by: NURSE PRACTITIONER

## 2018-01-16 PROCEDURE — 81001 URINALYSIS AUTO W/SCOPE: CPT | Performed by: PHYSICIAN ASSISTANT

## 2018-01-16 PROCEDURE — 25000132 ZZH RX MED GY IP 250 OP 250 PS 637: Mod: GY | Performed by: EMERGENCY MEDICINE

## 2018-01-16 PROCEDURE — 99207 ZZC CDG-HISTORY COMP: MEETS EXP. PROBLEM FOCUSED-DOWN CODED LACK OF ROS: CPT | Performed by: NURSE PRACTITIONER

## 2018-01-16 PROCEDURE — 25000132 ZZH RX MED GY IP 250 OP 250 PS 637: Mod: GY | Performed by: NURSE PRACTITIONER

## 2018-01-16 PROCEDURE — 25000132 ZZH RX MED GY IP 250 OP 250 PS 637: Mod: GY | Performed by: PHYSICIAN ASSISTANT

## 2018-01-16 PROCEDURE — 99207 ZZC NON-BILLABLE SERV PER CHARTING: CPT | Performed by: PHYSICIAN ASSISTANT

## 2018-01-16 RX ORDER — MORPHINE SULFATE 15 MG/1
15 TABLET, FILM COATED, EXTENDED RELEASE ORAL EVERY 12 HOURS SCHEDULED
Status: DISCONTINUED | OUTPATIENT
Start: 2018-01-16 | End: 2018-01-17

## 2018-01-16 RX ORDER — PANTOPRAZOLE SODIUM 40 MG/1
40 TABLET, DELAYED RELEASE ORAL EVERY MORNING
Status: DISCONTINUED | OUTPATIENT
Start: 2018-01-16 | End: 2018-01-18 | Stop reason: HOSPADM

## 2018-01-16 RX ORDER — HYOSCYAMINE SULFATE 0.125 MG
0.12 TABLET,DISINTEGRATING ORAL EVERY 6 HOURS PRN
Status: DISCONTINUED | OUTPATIENT
Start: 2018-01-16 | End: 2018-01-18 | Stop reason: HOSPADM

## 2018-01-16 RX ORDER — MORPHINE SULFATE 15 MG/1
15 TABLET ORAL EVERY 4 HOURS PRN
Status: DISCONTINUED | OUTPATIENT
Start: 2018-01-16 | End: 2018-01-16

## 2018-01-16 RX ORDER — LISINOPRIL 5 MG/1
5 TABLET ORAL DAILY
Status: DISCONTINUED | OUTPATIENT
Start: 2018-01-16 | End: 2018-01-18 | Stop reason: HOSPADM

## 2018-01-16 RX ORDER — GABAPENTIN 300 MG/1
300 CAPSULE ORAL 3 TIMES DAILY PRN
Status: DISCONTINUED | OUTPATIENT
Start: 2018-01-16 | End: 2018-01-18 | Stop reason: HOSPADM

## 2018-01-16 RX ORDER — OLANZAPINE 2.5 MG/1
2.5 TABLET, FILM COATED ORAL EVERY MORNING
Status: DISCONTINUED | OUTPATIENT
Start: 2018-01-17 | End: 2018-01-18 | Stop reason: HOSPADM

## 2018-01-16 RX ORDER — BACLOFEN 10 MG/1
10 TABLET ORAL 3 TIMES DAILY PRN
Status: DISCONTINUED | OUTPATIENT
Start: 2018-01-16 | End: 2018-01-18 | Stop reason: HOSPADM

## 2018-01-16 RX ORDER — MORPHINE SULFATE 15 MG/1
15 TABLET, FILM COATED, EXTENDED RELEASE ORAL 3 TIMES DAILY
Status: DISCONTINUED | OUTPATIENT
Start: 2018-01-16 | End: 2018-01-16

## 2018-01-16 RX ORDER — OLANZAPINE 10 MG/2ML
5-10 INJECTION, POWDER, FOR SOLUTION INTRAMUSCULAR 3 TIMES DAILY PRN
Status: DISCONTINUED | OUTPATIENT
Start: 2018-01-16 | End: 2018-01-18

## 2018-01-16 RX ORDER — OLANZAPINE 5 MG/1
5-10 TABLET, ORALLY DISINTEGRATING ORAL 3 TIMES DAILY PRN
Status: DISCONTINUED | OUTPATIENT
Start: 2018-01-16 | End: 2018-01-18

## 2018-01-16 RX ORDER — MORPHINE SULFATE 15 MG/1
7.5 TABLET ORAL ONCE
Status: COMPLETED | OUTPATIENT
Start: 2018-01-16 | End: 2018-01-16

## 2018-01-16 RX ORDER — OLANZAPINE 5 MG/1
10 TABLET, ORALLY DISINTEGRATING ORAL
Status: DISCONTINUED | OUTPATIENT
Start: 2018-01-16 | End: 2018-01-16

## 2018-01-16 RX ORDER — SELENIUM 50 MCG
1 TABLET ORAL DAILY
Status: DISCONTINUED | OUTPATIENT
Start: 2018-01-17 | End: 2018-01-16

## 2018-01-16 RX ORDER — METOPROLOL TARTRATE 25 MG/1
25 TABLET, FILM COATED ORAL 2 TIMES DAILY
Status: DISCONTINUED | OUTPATIENT
Start: 2018-01-16 | End: 2018-01-18 | Stop reason: HOSPADM

## 2018-01-16 RX ORDER — OLANZAPINE 5 MG/1
5 TABLET ORAL 3 TIMES DAILY PRN
Status: DISCONTINUED | OUTPATIENT
Start: 2018-01-16 | End: 2018-01-16

## 2018-01-16 RX ORDER — OLANZAPINE 5 MG/1
5 TABLET ORAL AT BEDTIME
Status: DISCONTINUED | OUTPATIENT
Start: 2018-01-16 | End: 2018-01-18 | Stop reason: HOSPADM

## 2018-01-16 RX ORDER — NALOXONE HYDROCHLORIDE 0.4 MG/ML
0.4 INJECTION, SOLUTION INTRAMUSCULAR; INTRAVENOUS; SUBCUTANEOUS
Status: DISCONTINUED | OUTPATIENT
Start: 2018-01-16 | End: 2018-01-18 | Stop reason: HOSPADM

## 2018-01-16 RX ORDER — DIVALPROEX SODIUM 500 MG/1
500 TABLET, EXTENDED RELEASE ORAL AT BEDTIME
Status: DISCONTINUED | OUTPATIENT
Start: 2018-01-16 | End: 2018-01-17

## 2018-01-16 RX ORDER — ACETAMINOPHEN 325 MG/1
650 TABLET ORAL EVERY 4 HOURS PRN
Status: DISCONTINUED | OUTPATIENT
Start: 2018-01-16 | End: 2018-01-18 | Stop reason: HOSPADM

## 2018-01-16 RX ORDER — TRAZODONE HYDROCHLORIDE 50 MG/1
50-200 TABLET, FILM COATED ORAL
Status: DISCONTINUED | OUTPATIENT
Start: 2018-01-16 | End: 2018-01-18 | Stop reason: HOSPADM

## 2018-01-16 RX ORDER — MORPHINE SULFATE 15 MG/1
15 TABLET ORAL EVERY 8 HOURS PRN
Status: DISCONTINUED | OUTPATIENT
Start: 2018-01-16 | End: 2018-01-18 | Stop reason: HOSPADM

## 2018-01-16 RX ADMIN — MORPHINE SULFATE 15 MG: 15 TABLET, EXTENDED RELEASE ORAL at 12:25

## 2018-01-16 RX ADMIN — OLANZAPINE 5 MG: 5 TABLET ORAL at 21:09

## 2018-01-16 RX ADMIN — OLANZAPINE 5 MG: 5 TABLET, ORALLY DISINTEGRATING ORAL at 18:27

## 2018-01-16 RX ADMIN — OLANZAPINE 5 MG: 5 TABLET, ORALLY DISINTEGRATING ORAL at 15:02

## 2018-01-16 RX ADMIN — Medication 7.5 MG: at 01:25

## 2018-01-16 RX ADMIN — MORPHINE SULFATE 15 MG: 15 TABLET ORAL at 17:25

## 2018-01-16 RX ADMIN — TRAZODONE HYDROCHLORIDE 200 MG: 50 TABLET ORAL at 21:16

## 2018-01-16 RX ADMIN — MORPHINE SULFATE 15 MG: 15 TABLET, EXTENDED RELEASE ORAL at 21:05

## 2018-01-16 RX ADMIN — MORPHINE SULFATE 15 MG: 15 TABLET ORAL at 09:06

## 2018-01-16 RX ADMIN — PANTOPRAZOLE SODIUM 40 MG: 40 TABLET, DELAYED RELEASE ORAL at 12:29

## 2018-01-16 RX ADMIN — LISINOPRIL 5 MG: 5 TABLET ORAL at 12:29

## 2018-01-16 ASSESSMENT — ACTIVITIES OF DAILY LIVING (ADL)
ORAL_HYGIENE: PROMPTS
DRESS: INDEPENDENT
DRESS: INDEPENDENT
ORAL_HYGIENE: PROMPTS
GROOMING: PROMPTS
LAUNDRY: UNABLE TO COMPLETE
LAUNDRY: UNABLE TO COMPLETE
GROOMING: PROMPTS

## 2018-01-16 ASSESSMENT — ENCOUNTER SYMPTOMS: SLEEP DISTURBANCE: 1

## 2018-01-16 NOTE — TELEPHONE ENCOUNTER
----- Message from Colby Camarillo sent at 1/16/2018  2:37 PM CST -----  Regarding: Inpatient [facility] patient  Contact: [phone]  TEOFILO Curran at [facility], called to speak with Dr. Gayle about a patient of his who was admitted last night.  Number is her pager number.    Thanks,  Colby

## 2018-01-16 NOTE — PLAN OF CARE
Problem: General Plan of Care (Inpatient Behavioral)  Goal: Individualization/Patient Specific Goal (IP Behavioral)  The patient and/or their representative will achieve their patient-specific goals related to the plan of care.    The patient-specific goals include:   Personal Plan of Care    Reasons you are in the hospital:  1. I was up 3 nights in a row- insomnia  2. Scattered thoughts    Goals for discharge:  1. Sleep more  2. Have fewer scattered thoughts    Note: Pt did not sign the personal plan of care

## 2018-01-16 NOTE — ED NOTES
Pt. refuses to change into scrub top, or give up cane.  Pt. sister in room with pt. Pt. very anxious and demanding.
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
## 2018-01-16 NOTE — ED NOTES
Bed: ED12  Expected date:   Expected time:   Means of arrival:   Comments:  Rena 629 58 y/o F PTSD

## 2018-01-16 NOTE — PROGRESS NOTES
"Formal consult to follow tomorrow.    Discussed with psychiatry NP, starting w/u for new onset kim and labs are being ordered.    Reviewed OP pain clinic notes and plan    #Kim: Utox positive for opiates and cannabinoids on admission. New onset in patient w/ significant psych history, recent life stressors. On medical THC for pain, also on opiods. Recent increase in OP trazodone. Otherwise per chart review no new meds. EKG w/ NSR w/ PAC, no acute ischemic changes-QTc of 471. Per OP lab review- TSH normal 11/22/2017. Increased life stress w/ recent loss of nephew and  Loss of SS benefits. PCP note from 12/08 notes inability to sleep. Psychiatry w/ concern that marijuana may be having negative effects on mental health symptoms (see note from 11/09/2017).   -Management per primary team, psychiatry  -Hold medical THC    #Chronic pain syndrome w/ lumbar arachnoiditis: Follows with OP pain clinic. Unable to do repeat injections due to worsening of underlying disease. PTA maintained on medical THC, daily morphine.   Per their last note 11/29/2017- plan as follows  \"Continue Morphine extended release 15 mg 2-3 times a day.   Continue Morphine immediate release 15 mg up to 2-3 tablets per day.  Just filled on 11/27/17 so please contact when you have about 1 week left for next refill  Continue use of medical cannabis as prescribed  Continue seeing PCP and GI specialist to evaluate stomach symptoms  Follow up in 8 weeks with Dolores\".  -Continue PTA medications w/ MS ER scheduled BID for now, may increase to TID if symptoms worsen, and will start MSIR 15 mg q8 hours PRN  -Will not continue medical cannabis while inpatient  -Narcan PRN    #HTN: PTA maintained on lisinopril 5 mg qday. OP BPs are well controlled and her OP regimen was recently decreased (lisinopril from 10-->5 mg qday for /50). Increased BP this AM.  -Continue lisinopril w/ hold parameters  -Will start metoprolol 25 mg BID w/ hold parameters  -Treat " ginny     #Abdominal pain: Unclear etiology. Per PCP notes has GI f/u & referral placed to OP GI.  -Continue Prilosec and hyoscyamine  -UA    Discussed with psychiatry who are ordering routine labs, talking to family and considering head CT. Did order UA. Formal consult to follow tomorrow.     TEOFILO Brock
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
## 2018-01-16 NOTE — ED NOTES
"Pt called EMS today because she felt she was having a PTSD crisis and needed help. Pt is rambling, tangential, very particular and is unsure of all of her meds/doses. Per EMS there were notes all over the patient's house and pt had a number of \"rules\" that needed to be followed in order for the EMS to transport her here (where to take her blood pressure, only one person talking at a time, no perfumes or scents, etc). When asked when this started, pt stated 1977. When asked what changed today that pt needed to call EMS, pt states \"I needed help.\" Pt denies suicidal ideation or homicidal ideation.  "

## 2018-01-16 NOTE — PROGRESS NOTES
To security...[employer] gift card, [employer] card and [employer] debit card.    In pt's locker..I-Pad, cord, headphones, cane, purse w/phone, set of keys, wallet, rewards cards and other personal and business cards. Robe w/strings and shoes w/laces.    With pt...therapeutic pillow and glasses.  A               Admission:  I am responsible for any personal items that are not sent to the safe or pharmacy.  [facility] is not responsible for loss, theft or damage of any property in my possession.    Signature:  _________________________________ Date: _______  Time: _____                                              Staff Signature:  ____________________________ Date: ________  Time: _____      2nd Staff person, if patient is unable/unwilling to sign:    Signature: ________________________________ Date: ________  Time: _____     Discharge:  [facility] has returned all of my personal belongings:    Signature: _________________________________ Date: ________  Time: _____                                          Staff Signature:  ____________________________ Date: ________  Time: _____
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
## 2018-01-16 NOTE — PROGRESS NOTES
[facility] [place] Date: 18  Query Report Page#: 1  Patient Rx History Report  ARCHANA LONG  Search Criteria: Last Name 'archana' and First Name 'stacey' and  = '[birth year]' and Request Period = ' to  ' - 4 out of 4 Recipients Selected.  Fill Date Product, Str, Form Qty Days Pt ID Prescriber Written RX# N/R* Pharm **MED+  ---------- -------------------------------- ------ ---- --------- ---------- ---------- ------------ ----- --------- ------  2017 MORPHINE SULFATE IR 15 MG TAB 84.00 28 [id number] 2017 [id number] N [id number] 45.0  2017 MORPHINE SULF ER 15 MG TABLET 84.00 28 [id number] 2017 [id number] N [id number] 45.0  2017 MORPHINE SULF ER 15 MG TABLET 84.00 28 [id number] 2017 [id number] N [id number] 45.0  2017 MORPHINE SULFATE IR 15 MG TAB 84.00 28 [id number] 2017 [id number] N [id number] 45.0  10/23/2017 MORPHINE SULF ER 15 MG TABLET 84.00 28 [id number] 10/04/2017 [id number] N [id number] UNK  10/23/2017 MORPHINE SULFATE IR 15 MG TAB 84.00 28 [id number] 10/04/2017 [id number] N [id number] 45.0  2017 MORPHINE SULF ER 15 MG TABLET 84.00 28 [id number] 2017 [id number] N [id number] UNK  2017 MORPHINE SULFATE IR 15 MG TAB 84.00 28 [id number] 2017 [id number] N [id number] 45.0  2017 MORPHINE SULFATE IR 15 MG TAB 84.00 28 [id number] 2017 [id number] N [id number] 45.0  2017 MORPHINE SULF ER 15 MG TABLET 84.00 28 [id number] 2017 [id number] N [id number] 45.0  2017 MORPHINE SULF ER 15 MG TABLET 84.00 28 [id number] 2017 [id number] N [id number] 45.0  2017 MORPHINE SULFATE IR 15 MG TAB 84.00 28 [id number] 2017 [id number] N [id number] 45.0  2017 MORPHINE SULF ER 15 MG TABLET 84.00 28 [id number] 2017 [id number] N [id number] 45.0  2017 MORPHINE SULFATE IR 15 MG TAB 84.00 28 [id number] 2017 [id number] N  [id number] 45.0  05/06/2017 MORPHINE SULF ER 15 MG TABLET 84.00 28 [id number] 04/19/2017 [id number] N [id number] 45.0  05/06/2017 MORPHINE SULFATE IR 15 MG TAB 84.00 28 [id number] 04/19/2017 [id number] N [id number] 45.0  03/29/2017 MORPHINE SULF ER 15 MG TABLET 84.00 28 [id number] 03/29/2017 [id number] N [id number] 45.0  03/29/2017 MORPHINE SULFATE IR 15 MG TAB 84.00 28 [id number] 03/29/2017 [id number] N [id number] 45.0  03/13/2017 ZOLPIDEM TARTRATE 10 MG TABLET 90.00 90 [id number] 03/10/2017 [id number] N [id number] 00.0  02/28/2017 MORPHINE SULF ER 15 MG TABLET 84.00 28 [id number] 02/21/2017 [id number] N [id number] 45.0  02/28/2017 MORPHINE SULFATE IR 15 MG TAB 84.00 28 [id number] 02/21/2017 [id number] N [id number] 45.0  02/13/2017 MORPHINE SULF ER 15 MG TABLET 32.00 8 [id number] 02/13/2017 [id number] N [id number] 60.0  02/13/2017 MORPHINE SULFATE IR 15 MG TAB 32.00 8 [id number] 02/13/2017 [id number] N [id number] 60.0  *N/R N=New R=Refill  +MED Daily  Prescribers for prescriptions listed  ----------------------------------------------------------------------------------------------------------------------------------  [id number] FLETCHER WANG D.; [address], [address] [facility] [address]  [id number] CM BLAKE APRN [facility]; [facility], [address] [facility] [address]  [id number] JU BROWN B; [facility], [address], [facility] [address]  [id number] JENA SALDANA; [address] [facility] [address]  Pharmacies that dispensed prescriptions listed  ----------------------------------------------------------------------------------------------------------------------------------  [id number] Counts include 234 beds at the [facility]; [address] [place] [address],  [id number] [facility].; : [facility] [id number], [address] [facility]  [address],  ----------------------------------------------------------------------------------------------------------------------------------  [id number] ALON MART, [facility] [birth year]; [address], [place] [address]  [id number] ALON MART, [facility] [birth year]; [address], [place] [address]  [id number] ALON MART, [facility] [birth year]; [address] , [place] [address]  [id number] ALON MRAT, [facility] [birth year]; [address] , [place] [address]
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
## 2018-01-16 NOTE — PROGRESS NOTES
Pt has not attended OT groups yet. They will be encouraged to attend groups and be provided a Self Assessment form.  OT staff will explain the value of OT, including them in their treatment plan and offer options to meet their needs and identify goals.
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
## 2018-01-17 LAB
ALBUMIN SERPL-MCNC: 3.4 G/DL (ref 3.4–5)
ALP SERPL-CCNC: 82 U/L (ref 40–150)
ALT SERPL W P-5'-P-CCNC: 19 U/L (ref 0–50)
ANION GAP SERPL CALCULATED.3IONS-SCNC: 9 MMOL/L (ref 3–14)
AST SERPL W P-5'-P-CCNC: 17 U/L (ref 0–45)
BACTERIA SPEC CULT: NORMAL
BASOPHILS # BLD AUTO: 0 10E9/L (ref 0–0.2)
BASOPHILS NFR BLD AUTO: 0.4 %
BILIRUB SERPL-MCNC: 0.5 MG/DL (ref 0.2–1.3)
BUN SERPL-MCNC: 13 MG/DL (ref 7–30)
CALCIUM SERPL-MCNC: 8.9 MG/DL (ref 8.5–10.1)
CHLORIDE SERPL-SCNC: 109 MMOL/L (ref 94–109)
CO2 SERPL-SCNC: 28 MMOL/L (ref 20–32)
CREAT SERPL-MCNC: 0.59 MG/DL (ref 0.52–1.04)
DEPRECATED CALCIDIOL+CALCIFEROL SERPL-MC: 40 UG/L (ref 20–75)
DIFFERENTIAL METHOD BLD: NORMAL
EOSINOPHIL # BLD AUTO: 0.1 10E9/L (ref 0–0.7)
EOSINOPHIL NFR BLD AUTO: 1.7 %
ERYTHROCYTE [DISTWIDTH] IN BLOOD BY AUTOMATED COUNT: 13.6 % (ref 10–15)
FOLATE SERPL-MCNC: 10.8 NG/ML
GFR SERPL CREATININE-BSD FRML MDRD: >90 ML/MIN/1.7M2
GLUCOSE SERPL-MCNC: 89 MG/DL (ref 70–99)
HCT VFR BLD AUTO: 42 % (ref 35–47)
HGB BLD-MCNC: 13.5 G/DL (ref 11.7–15.7)
IMM GRANULOCYTES # BLD: 0 10E9/L (ref 0–0.4)
IMM GRANULOCYTES NFR BLD: 0.1 %
INTERPRETATION ECG - MUSE: NORMAL
LYMPHOCYTES # BLD AUTO: 2.2 10E9/L (ref 0.8–5.3)
LYMPHOCYTES NFR BLD AUTO: 29 %
Lab: NORMAL
MCH RBC QN AUTO: 26.9 PG (ref 26.5–33)
MCHC RBC AUTO-ENTMCNC: 32.1 G/DL (ref 31.5–36.5)
MCV RBC AUTO: 84 FL (ref 78–100)
MONOCYTES # BLD AUTO: 0.9 10E9/L (ref 0–1.3)
MONOCYTES NFR BLD AUTO: 12.3 %
NEUTROPHILS # BLD AUTO: 4.3 10E9/L (ref 1.6–8.3)
NEUTROPHILS NFR BLD AUTO: 56.5 %
NRBC # BLD AUTO: 0 10*3/UL
NRBC BLD AUTO-RTO: 0 /100
PLATELET # BLD AUTO: 224 10E9/L (ref 150–450)
POTASSIUM SERPL-SCNC: 3.2 MMOL/L (ref 3.4–5.3)
PROT SERPL-MCNC: 7 G/DL (ref 6.8–8.8)
RBC # BLD AUTO: 5.01 10E12/L (ref 3.8–5.2)
SODIUM SERPL-SCNC: 146 MMOL/L (ref 133–144)
SPECIMEN SOURCE: NORMAL
TSH SERPL DL<=0.005 MIU/L-ACNC: 1.2 MU/L (ref 0.4–4)
VIT B12 SERPL-MCNC: 552 PG/ML (ref 193–986)
WBC # BLD AUTO: 7.6 10E9/L (ref 4–11)

## 2018-01-17 PROCEDURE — 82746 ASSAY OF FOLIC ACID SERUM: CPT | Performed by: NURSE PRACTITIONER

## 2018-01-17 PROCEDURE — 99232 SBSQ HOSP IP/OBS MODERATE 35: CPT | Performed by: NURSE PRACTITIONER

## 2018-01-17 PROCEDURE — A9270 NON-COVERED ITEM OR SERVICE: HCPCS | Mod: GY | Performed by: NURSE PRACTITIONER

## 2018-01-17 PROCEDURE — 99207 ZZC CONSULT E&M CHANGED TO SUBSEQUENT LEVEL: CPT | Performed by: PHYSICIAN ASSISTANT

## 2018-01-17 PROCEDURE — 84443 ASSAY THYROID STIM HORMONE: CPT | Performed by: NURSE PRACTITIONER

## 2018-01-17 PROCEDURE — 82306 VITAMIN D 25 HYDROXY: CPT | Performed by: NURSE PRACTITIONER

## 2018-01-17 PROCEDURE — 82607 VITAMIN B-12: CPT | Performed by: NURSE PRACTITIONER

## 2018-01-17 PROCEDURE — 25000132 ZZH RX MED GY IP 250 OP 250 PS 637: Mod: GY | Performed by: NURSE PRACTITIONER

## 2018-01-17 PROCEDURE — 85025 COMPLETE CBC W/AUTO DIFF WBC: CPT | Performed by: NURSE PRACTITIONER

## 2018-01-17 PROCEDURE — 25000132 ZZH RX MED GY IP 250 OP 250 PS 637: Mod: GY | Performed by: PHYSICIAN ASSISTANT

## 2018-01-17 PROCEDURE — A9270 NON-COVERED ITEM OR SERVICE: HCPCS | Mod: GY | Performed by: PHYSICIAN ASSISTANT

## 2018-01-17 PROCEDURE — 12400007 ZZH R&B MH INTERMEDIATE UMMC

## 2018-01-17 PROCEDURE — 80053 COMPREHEN METABOLIC PANEL: CPT | Performed by: NURSE PRACTITIONER

## 2018-01-17 PROCEDURE — 99232 SBSQ HOSP IP/OBS MODERATE 35: CPT | Performed by: PHYSICIAN ASSISTANT

## 2018-01-17 PROCEDURE — 36415 COLL VENOUS BLD VENIPUNCTURE: CPT | Performed by: NURSE PRACTITIONER

## 2018-01-17 RX ORDER — SERTRALINE HYDROCHLORIDE 100 MG/1
200 TABLET, FILM COATED ORAL DAILY
Status: DISCONTINUED | OUTPATIENT
Start: 2018-01-17 | End: 2018-01-18 | Stop reason: HOSPADM

## 2018-01-17 RX ORDER — POTASSIUM CHLORIDE 1.5 G/1.58G
40 POWDER, FOR SOLUTION ORAL ONCE
Status: COMPLETED | OUTPATIENT
Start: 2018-01-17 | End: 2018-01-17

## 2018-01-17 RX ORDER — MORPHINE SULFATE 15 MG/1
15 TABLET, FILM COATED, EXTENDED RELEASE ORAL 3 TIMES DAILY
Status: DISCONTINUED | OUTPATIENT
Start: 2018-01-17 | End: 2018-01-18 | Stop reason: HOSPADM

## 2018-01-17 RX ORDER — POLYETHYLENE GLYCOL 3350 17 G/17G
17 POWDER, FOR SOLUTION ORAL DAILY PRN
Status: DISCONTINUED | OUTPATIENT
Start: 2018-01-17 | End: 2018-01-18 | Stop reason: HOSPADM

## 2018-01-17 RX ORDER — SERTRALINE HYDROCHLORIDE 100 MG/1
100 TABLET, FILM COATED ORAL DAILY
Status: DISCONTINUED | OUTPATIENT
Start: 2018-01-17 | End: 2018-01-17

## 2018-01-17 RX ADMIN — MORPHINE SULFATE 15 MG: 15 TABLET, EXTENDED RELEASE ORAL at 20:26

## 2018-01-17 RX ADMIN — HYOSCYAMINE SULFATE 0.12 MG: 0.12 TABLET, ORALLY DISINTEGRATING ORAL at 14:07

## 2018-01-17 RX ADMIN — TRAZODONE HYDROCHLORIDE 200 MG: 50 TABLET ORAL at 20:38

## 2018-01-17 RX ADMIN — LISINOPRIL 5 MG: 5 TABLET ORAL at 08:51

## 2018-01-17 RX ADMIN — OLANZAPINE 5 MG: 5 TABLET, ORALLY DISINTEGRATING ORAL at 18:32

## 2018-01-17 RX ADMIN — VITAMIN D, TAB 1000IU (100/BT) 4000 UNITS: 25 TAB at 08:51

## 2018-01-17 RX ADMIN — OLANZAPINE 5 MG: 5 TABLET ORAL at 20:26

## 2018-01-17 RX ADMIN — PANTOPRAZOLE SODIUM 40 MG: 40 TABLET, DELAYED RELEASE ORAL at 08:51

## 2018-01-17 RX ADMIN — SERTRALINE HYDROCHLORIDE 200 MG: 100 TABLET ORAL at 11:10

## 2018-01-17 RX ADMIN — OLANZAPINE 2.5 MG: 2.5 TABLET, FILM COATED ORAL at 08:51

## 2018-01-17 RX ADMIN — MORPHINE SULFATE 15 MG: 15 TABLET ORAL at 09:10

## 2018-01-17 RX ADMIN — MORPHINE SULFATE 15 MG: 15 TABLET, EXTENDED RELEASE ORAL at 08:51

## 2018-01-17 RX ADMIN — MORPHINE SULFATE 15 MG: 15 TABLET ORAL at 16:15

## 2018-01-17 RX ADMIN — POTASSIUM CHLORIDE 40 MEQ: 1.5 POWDER, FOR SOLUTION ORAL at 14:07

## 2018-01-17 RX ADMIN — POLYETHYLENE GLYCOL 3350 17 G: 17 POWDER, FOR SOLUTION ORAL at 20:27

## 2018-01-17 ASSESSMENT — ACTIVITIES OF DAILY LIVING (ADL)
DRESS: INDEPENDENT
DRESS: SCRUBS (BEHAVIORAL HEALTH)
GROOMING: PROMPTS
ORAL_HYGIENE: PROMPTS
LAUNDRY: WITH SUPERVISION
LAUNDRY: UNABLE TO COMPLETE
ORAL_HYGIENE: INDEPENDENT
GROOMING: INDEPENDENT

## 2018-01-17 NOTE — PROGRESS NOTES
01/17/18 1300   Behavioral Health   Hallucinations other (see comment)  (PILO)   Thinking poor concentration;distractable   Orientation person: oriented;place: oriented   Memory baseline memory   Insight poor   Judgement impaired   Eye Contact at examiner   Affect tense   Mood mood is calm;anxious   Physical Appearance/Attire disheveled   Hygiene neglected grooming - unclean body, hair, teeth   Suicidality other (see comments)  (none stated)   1. Wish to be Dead No   2. Non-Specific Active Suicidal Thoughts  No   Self Injury other (see comment)  (none stated)   Elopement (none noted)   Activity isolative;withdrawn;refusal   Speech pressured;tangential   Medication Sensitivity no stated side effects;no observed side effects   Safety   Suicidality Status 15   Coping/Psychosocial   Verbalized Emotional State frustration   Plan Of Care Reviewed With patient   Psycho Education   Type of Intervention 1:1 intervention   Response refuses   Hours 0.5   Treatment Detail (check in)   Group Therapy Session   Group Attendance other (see comments)  (attended 1/4 groups)   Activities of Daily Living   Hygiene/Grooming prompts   Oral Hygiene prompts   Dress independent   Laundry unable to complete   Room Organization prompts   Activity   Activity Assistance Provided independent   Behavioral Health Interventions   Depression maintain safety precautions;maintain safe secure environment;encourage nutrition and hydration;encourage participation / independence with adls;provide emotional support;establish therapeutic relationship;assist with developing and utilizing healthy coping strategies;build upon strengths;monitor need for prn medication;monitor confusion, memory loss, decision making ability and reorient / intervene as needed   Social and Therapeutic Interventions (Depression) encourage socialization with peers;encourage effective boundaries with peers;encourage participation in therapeutic groups and milieu activities     Pt  "was in room most of shift awake and sitting or laying down. Pt affect tense at times with calm mood. Pt stated to staff \"I can't speak with you. You must have something with perfume. I am allergic to chemicals in those things and I can't be around it.\" Pt came out to milieu for medications, but not for meals. Pt seen eating a protein bar this shift. Pt attended afternoon spirituality group. Pt appeared calm and cooperative with other staff besides writer.  "

## 2018-01-17 NOTE — PLAN OF CARE
Problem: General Plan of Care (Inpatient Behavioral)  Goal: Team Discussion  Team Plan:    BEHAVIORAL TEAM DISCUSSION    Participants: Cortney Bermudez DNP, Emily, Connie Edgar, LICSW, Lisa Wagner, OT  Progress: New admit  Continued Stay Criteria/Rationale:Kim  Medical/Physical: See medical notes  Precautions:   Behavioral Orders   Procedures     Code 1 - Restrict to Unit     Routine Programming     As clinically indicated     Single Room     Status 15     Every 15 minutes.     Suicide precautions     Plan: The plan is to assess the patient for mental health and medication needs.  The patient will be prescribed medications to treat the identified symptoms.  Upon discharge the patient will be referred to services as appropriate based on the assessment.  Rationale for change in precautions or plan: N/A      Problem: Patient Care Overview  Goal: Team Discussion  Team Plan:    BEHAVIORAL TEAM DISCUSSION    Participants: Cortney Bermudez DNP, Emily, Connie Edgar, LICSW, Margoth Alexandre OT  Progress: New admit  Continued Stay Criteria/Rationale:Kim  Medical/Physical: See medical notes  Precautions:   Behavioral Orders   Procedures     Code 1 - Restrict to Unit     Routine Programming     As clinically indicated     Single Room     Status 15     Every 15 minutes.     Suicide precautions     Plan: The plan is to assess the patient for mental health and medication needs.  The patient will be prescribed medications to treat the identified symptoms.  Upon discharge the patient will be referred to services as appropriate based on the assessment.  Rationale for change in precautions or plan: N/A

## 2018-01-17 NOTE — PROGRESS NOTES
"Pt states she is getting worse here in hospital.  \"I don't have my special needs met here.\"  \"need my bed, chair, shoes, cane, no fragrances.\"        "
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
## 2018-01-17 NOTE — PROGRESS NOTES
CLINICAL NUTRITION SERVICES - ASSESSMENT NOTE     Nutrition Prescription    RECOMMENDATIONS FOR MDs/PROVIDERS TO ORDER:  1. Obtain weight as able (pt refused on admission).     See other recommendations below.     Malnutrition Status:    Patient does not meet two of the criteria necessary for diagnosing malnutrition but is at risk for malnutrition    Recommendations already ordered by Registered Dietitian (RD):  10 am and 2pm snack: Pineapple and vanilla Greek yogurt.     Future/Additional Recommendations:  1. Monitor and encourage PO intake of meals/snacks/fluids. Please document PO intakes in chart to assist with nutritional assessment.     2. Adjust snack order PRN per pt preference (declined supplements today).     3. Encourage friends/family to bring in foods from home that pt will eat to increase po intake.      REASON FOR ASSESSMENT  Keshia Arellano is a/an 57 year old female assessed by the dietitian for Provider Order - Calorie Counts     NUTRITION HISTORY  Pt with a history of major depressive disorder, PTSD, bulimia, and mood disorder and medical dx include chronic back/hip pain, GERD, and spinal stenosis presenting with c/o increase in stress. According to chart note from PCP, pt c/o stomach issues, decreased appetite, weight loss. Pt also with hx of bulimia and anorexia and binge eating in the past. Also uses medical marijuana, which affects appetite and pt dislikes the increase in appetite. Per PCP note, pt also presented to urgent care 11/22/2017 for loss of appetite and abdominal pain. At this time, pt stated she has been having anorexia for the past 1 year, but worsening in months leading up with reported of losing 17 lbs in the past year. However, notes also including pt is a poor historian.    Per discussion with pt, she follows an organic diet at home. Pt states for the past two months, her appetite/intakes have been variable 2/2 stomach pain. Pt states when she is feeling well without stomach  "pain she will eat, but when she is experiencing pain her PO intake is poor. Pt states she notices for about the past year she has been losing weight, stating she only eats when she feels hungry and likes to \"listen to her body\" and typically does not eat in the morning. Pt also states over the past two months, she has been losing weight more significantly due to abdominal pain (per MD note, has f/u with GI as OP). For PO intake, pt states she likes to eat organic soup, protein shakes, protein smoothies, frozen meals, protein bars, etc. Of note, pt did not endorse any hx of eating disorder to writer during visit.     CURRENT NUTRITION ORDERS  Diet: Regular + Calorie Counts   Intake/Tolerance: Pt recently admitted 1/15. Per chart review, pt declined all meals yesterday 1/16, but ate 3 protein bars and drank some fluids. Per discussion with unit staff, pt has not had any meals today, but is eating some protein bars from home.     LABS  Labs reviewed    MEDICATIONS  Medications reviewed  Vitamin D3 daily    ANTHROPOMETRICS  Height: 167.6 cm (5' 6\")  Most Recent Weight:  (refused)    IBW: 59.1 kg (130 lb)  BMI: Obesity Grade I BMI 30-34.9 (based on wt from 11/9/2017 and current height)   Weight History: Unable to accurately assess weight trends 2/2 lack of recent weight data (as shown below) and pt refusal for weight check. Per chart, weight on 11/9/17, pt was 95.7 kg. Pt reports losing weight over the past year, more significantly over the past two months.   Wt Readings from Last 10 Encounters:   07/19/12 82.5 kg (181 lb 12.8 oz)   10/21/11 79.5 kg (175 lb 3.2 oz)   09/29/11 77.7 kg (171 lb 6.4 oz)   08/18/11 71.2 kg (157 lb)   08/12/11 71.2 kg (157 lb)   04/04/11 75.2 kg (165 lb 12.8 oz)   08/24/10 70.3 kg (155 lb)   08/19/10 71.3 kg (157 lb 3.2 oz)   08/10/10 70.8 kg (156 lb)   04/01/10 76.7 kg (169 lb)     Dosing Weight: 68 kg (adjusted based on wt 11/9/17 and IBW)    ASSESSED NUTRITION NEEDS  Estimated Energy Needs: " 6772-8697 kcals/day (25 - 30 kcals/kg)  Justification: Maintenance  Estimated Protein Needs: 54-68 grams protein/day (0.8 - 1 grams of pro/kg)  Justification: Maintenance  Estimated Fluid Needs: 1 mL/kcal or per MD    Justification: Maintenance    PHYSICAL FINDINGS  See malnutrition section below.    MALNUTRITION  % Intake: </=75% for >/= 1 month (severe)  % Weight Loss: Unable to assess  Subcutaneous Fat Loss: None observed  Muscle Loss: None observed  Fluid Accumulation/Edema: None noted  Malnutrition Diagnosis: Patient does not meet two of the above criteria necessary for diagnosing malnutrition but is at risk for malnutrition    NUTRITION DIAGNOSIS  Predicted suboptimal nutrient intake related to abdominal pain and potential disordered eating behaviors hindering PO intake as evidenced by pt reporting variable PO intake (at times not eating at all) over the past 2 months, minimal PO since admission, and report of weight loss over the past two months/year (although unable to quantify/confirm with wt hx).      INTERVENTIONS  Implementation  Nutrition Education: Discussed nutrition history and PO since admission. Discussed menu ordering and snacks available on the unit. Pt states she does not want to eat meals 2/2 abdominal pain and wishes to eat foods from home, but is interested in receiving snacks of yogurt/pineapple. Discussed oral nutrition supplements and pt declined, stating she will not drink nutritional supplements unless they are organic. Encouraged adequate PO of food and fluids. Encouraged pt to have family/friends bring in foods from home that she likes and will eat.   Collaboration with other providers - Discussed pt with RN. Per RN, pt may be discharging tomorrow.   Modify composition of meals/snacks - per above     Goals  Patient to consume % of nutritionally adequate meal trays TID, or the equivalent with supplements/snacks.     Monitoring/Evaluation  Progress toward goals will be monitored  and evaluated per protocol.      Yessica Smalls RD, LD  Unit Pager: [phone]
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
## 2018-01-17 NOTE — PROGRESS NOTES
"Pt in room most of shift. Denies SI/SIB. Sister here to visit and brought pt's protein bars. Pt declined to eat dinner, ate 3 protein bars. Fluids encouraged, pt taking fluids. HS /52. Pt declined to take Depakote, reporting she is \"sensitive to medications\". Pt reports she needs to \"be taking Zoloft\" as it is the \"only anti-depression medication\" that helps her. Poor insight, mood labile and anxious. Affect tense and angry. Speech is pressured and rambling. PRN 5 mg Zyprexa given at 1827 for agitation with relief reported, speech less pressured and less rambling. PRN morphine IR 15 mg given at 1725 for pain with relief reported. Pt reports she used to take morphine extended relief 15 mg 4 times a day prior to using \"medial mariajuana\"  And lowered to the morphine to TID after staring \"medial mariajuana\". Pt feels going home and seeing her psychiatrist would be the most helpful for her because he is familiar with her. Pt reported she wanted to find a \"new physical doctor\" because her current doctor \"talks about their life and wants me to have another surgery I don't need\". Gait balance and steady. Shoes locked in pt room for night.  "

## 2018-01-18 VITALS
BODY MASS INDEX: 29.73 KG/M2 | HEIGHT: 66 IN | SYSTOLIC BLOOD PRESSURE: 142 MMHG | OXYGEN SATURATION: 97 % | TEMPERATURE: 98 F | DIASTOLIC BLOOD PRESSURE: 73 MMHG | HEART RATE: 91 BPM | WEIGHT: 185 LBS | RESPIRATION RATE: 16 BRPM

## 2018-01-18 LAB
POTASSIUM SERPL-SCNC: 3.5 MMOL/L (ref 3.4–5.3)
SODIUM SERPL-SCNC: 142 MMOL/L (ref 133–144)

## 2018-01-18 PROCEDURE — 84295 ASSAY OF SERUM SODIUM: CPT | Performed by: PHYSICIAN ASSISTANT

## 2018-01-18 PROCEDURE — 84132 ASSAY OF SERUM POTASSIUM: CPT | Performed by: PHYSICIAN ASSISTANT

## 2018-01-18 PROCEDURE — 25000132 ZZH RX MED GY IP 250 OP 250 PS 637: Mod: GY | Performed by: NURSE PRACTITIONER

## 2018-01-18 PROCEDURE — A9270 NON-COVERED ITEM OR SERVICE: HCPCS | Mod: GY | Performed by: PHYSICIAN ASSISTANT

## 2018-01-18 PROCEDURE — 25000132 ZZH RX MED GY IP 250 OP 250 PS 637: Mod: GY | Performed by: PHYSICIAN ASSISTANT

## 2018-01-18 PROCEDURE — 36415 COLL VENOUS BLD VENIPUNCTURE: CPT | Performed by: PHYSICIAN ASSISTANT

## 2018-01-18 PROCEDURE — 99207 ZZC NON-BILLABLE SERV PER CHARTING: CPT | Performed by: PHYSICIAN ASSISTANT

## 2018-01-18 PROCEDURE — A9270 NON-COVERED ITEM OR SERVICE: HCPCS | Mod: GY | Performed by: NURSE PRACTITIONER

## 2018-01-18 PROCEDURE — 99238 HOSP IP/OBS DSCHRG MGMT 30/<: CPT | Performed by: NURSE PRACTITIONER

## 2018-01-18 RX ORDER — OLANZAPINE 2.5 MG/1
2.5 TABLET, FILM COATED ORAL EVERY MORNING
Qty: 30 TABLET | Refills: 0 | Status: SHIPPED | OUTPATIENT
Start: 2018-01-18 | End: 2018-01-25

## 2018-01-18 RX ORDER — METOPROLOL TARTRATE 25 MG/1
25 TABLET, FILM COATED ORAL 2 TIMES DAILY
Qty: 60 TABLET | Refills: 0 | Status: SHIPPED | OUTPATIENT
Start: 2018-01-18 | End: 2024-08-13

## 2018-01-18 RX ORDER — OLANZAPINE 5 MG/1
5 TABLET ORAL 2 TIMES DAILY PRN
Status: DISCONTINUED | OUTPATIENT
Start: 2018-01-18 | End: 2018-01-18 | Stop reason: HOSPADM

## 2018-01-18 RX ORDER — OLANZAPINE 5 MG/1
5 TABLET ORAL AT BEDTIME
Qty: 30 TABLET | Refills: 0 | Status: SHIPPED | OUTPATIENT
Start: 2018-01-18 | End: 2018-01-25

## 2018-01-18 RX ORDER — OLANZAPINE 5 MG/1
5 TABLET ORAL 2 TIMES DAILY PRN
Qty: 60 TABLET | Refills: 0 | Status: SHIPPED | OUTPATIENT
Start: 2018-01-18 | End: 2018-01-25

## 2018-01-18 RX ADMIN — MORPHINE SULFATE 15 MG: 15 TABLET ORAL at 01:40

## 2018-01-18 RX ADMIN — LISINOPRIL 5 MG: 5 TABLET ORAL at 08:00

## 2018-01-18 RX ADMIN — OLANZAPINE 2.5 MG: 2.5 TABLET, FILM COATED ORAL at 08:00

## 2018-01-18 RX ADMIN — MORPHINE SULFATE 15 MG: 15 TABLET, EXTENDED RELEASE ORAL at 08:00

## 2018-01-18 RX ADMIN — SERTRALINE HYDROCHLORIDE 200 MG: 100 TABLET ORAL at 08:00

## 2018-01-18 RX ADMIN — MORPHINE SULFATE 15 MG: 15 TABLET ORAL at 08:00

## 2018-01-18 RX ADMIN — PANTOPRAZOLE SODIUM 40 MG: 40 TABLET, DELAYED RELEASE ORAL at 08:00

## 2018-01-18 RX ADMIN — VITAMIN D, TAB 1000IU (100/BT) 4000 UNITS: 25 TAB at 08:00
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
## 2018-01-18 NOTE — PLAN OF CARE
"Problem: Manic Symptoms  Goal: Manic Symptoms  Patient doesn't harm herself or others  Patient demonstrates a stable mood and practice self-care activities  Patient able to control thought processes  Patient demonstrates a normal sleep pattern  Patient interacts adequately with others  Patient is compliant with recommended medications  Patient expresses understanding of the illness and states how to obtain assistance or support from others  Patient engages in goal-directed activity and no longer exhibits disturbed thinking     Outcome: Adequate for Discharge Date Met: 01/18/18    Pt is pleasant,calm and appropriate in milieu. States she feels back to baseline.  \"It is a world of difference to have gotten some sleep.\"    Denies SI.  Agreeable to new med changes with Zyprexa and plans to review with her outpt provider this afternoon at appt.  30 day supply sent with pt.  (declined Metoprolol that was filled- \"I don't take that\")    Sister here to transport home.  D/c'd with all belongings.        "

## 2018-01-18 NOTE — DISCHARGE INSTRUCTIONS
Behavioral Discharge Planning and Instructions      Summary:  You were admitted on 1/15/2018  due to Manic Symptomology.  You were treated by DOMINGA Oakley DNP and discharged on 01/18/2018 from [facility] to Home.      Principal Diagnosis:   Substance Abuse Disorder    Health Care Follow-up Appointments:   Psychiatrist:  Dr. Dieter Gayle - Thursday, January 18th at 3:15 pm  [address] [address]   Phone: [phone]      Attend all scheduled appointments with your outpatient providers. Call at least 24 hours in advance if you need to reschedule an appointment to ensure continued access to your outpatient providers.   Major Treatments, Procedures and Findings:  You were provided with: a psychiatric assessment, assessed for medical stability, medication evaluation and/or management and medical interventions    Symptoms to Report: feeling more aggressive, increased confusion, losing more sleep, mood getting worse or thoughts of suicide    Early warning signs can include: increased depression or anxiety sleep disturbances increased thoughts or behaviors of suicide or self-harm  increased unusual thinking, such as paranoia or hearing voices    Safety and Wellness:  Take all medicines as directed.  Make no changes unless your doctor suggests them.      Follow treatment recommendations.  Refrain from alcohol and non-prescribed drugs.  If there is a concern for safety, call 911.    Resources:   Crisis Intervention: 720.999.1005 or 785-294-2908 (TTY: 918.997.2816).  Call anytime for help.  [place] on [place]ness (www.mn.amrik.org): 993.221.3460 or 248-456-4063.  Suicide Awareness Voices of Education (SAVE) (www.save.org): 944-753-DPTK (6183)  National Suicide Prevention Line (www.mentalhealthmn.org): 659-474-VIAF (8900)  Mental Health Consumer/Survivor Network of [place] (www.mhcsn.net): 919.416.8065 or 399-732-6520  Mental Health Association of [place] (www.mentalhealth.org):  295.239.4545 or 040-221-3125  Self- Management and Recovery Training., SMART-- Toll free: 532.536.8890  www.[place]  [facility] Crisis Response - Adult [phone]    The treatment team has appreciated the opportunity to work with you.     If you have any questions or concerns our unit number is [phone].  You may be receiving a follow-up phone call within the next three days from a representative from behavioral health.    You have identified the best phone number to reach you as [phone]

## 2018-01-18 NOTE — PLAN OF CARE
Problem: Manic Symptoms  Goal: Manic Symptoms  Patient doesn't harm herself or others  Patient demonstrates a stable mood and practice self-care activities  Patient able to control thought processes  Patient demonstrates a normal sleep pattern  Patient interacts adequately with others  Patient is compliant with recommended medications  Patient expresses understanding of the illness and states how to obtain assistance or support from others  Patient engages in goal-directed activity and no longer exhibits disturbed thinking     Outcome: No Change  Patient visible in milieu, tense at times.  Flat affect, denies SI/SIB, hallucination this shift.  Patient very needy, took all possible prn medications for pain and anxiety.  Showered, fair appetite at supper time.  Patient looking forward to discharge tomorrow.  Sleeping comfortably in room, will continue to monitor closely.

## 2018-01-18 NOTE — PROGRESS NOTES
Brief medicine note:     In short, Keshia Arellano is a 57 year old female with a past medical history of GERD, HTN, spinal stenosis, chronic pain disorder, depression, anxiety, mood disorder who is admitted to [facility] for ginny, much improved yesterday.     K and Na have normalized. Will not need metoprolol on discharge, and should continue OP f/u for BP. UC negative for infection and patient did not have symptoms- will not treat.     Medicine will sign off. Please notify on call MARIOLA if any intercurrent medical issues arise.     Hiwot Garrido PA-C

## 2018-01-19 ENCOUNTER — COMMUNICATION - HEALTHEAST (OUTPATIENT)
Dept: PALLIATIVE MEDICINE | Facility: CLINIC | Age: 58
End: 2018-01-19

## 2018-01-22 ENCOUNTER — CARE COORDINATION (OUTPATIENT)
Dept: PSYCHIATRY | Facility: CLINIC | Age: 58
End: 2018-01-22

## 2018-01-22 NOTE — PROGRESS NOTES
"Colby Camarillo Michelle, RN        Phone Number: [phone]                     Pt called and said she left a message for Dr. Tiesha Ayala for therapy, she does not know if she needs a referral, so she would like a referral from Dr. Gayle if she needs a referral.     She also wanted to pass along that she was Inpatient on the 3rd floor at our hospital for 2 and a half days earlier this week.  She asked them if she could talk to Dr. Gayle multiple times because he's the only one she felt comfortable talking to, and they would not let her. The inpatient doctor also wanted to prescribe anti-anxiety medication for the patient. The patient asked multiple times and the nurses also said it was an anti-anxiety medication before giving it to her.  After she got home, she looked up the medication to read about its' side effects and found out it was an antipsychotic, which she didn't agree to taking.     OK to leave detailed voicemail message       [facility] Discharge Summary: Yes  Compare Pt Discharge summary to that in EPIC: Yes  Able to get meds filled: Yes  Mood: Pt reports difficulty in describing mood, yet denies anxiety.  Is sleeping better however does not quantify this after writer asks, states \"when I sleep I sleep\".  Denies any safety concerns.  Of note, pt appears alert and oriented.  Is somewhat organized and tangential, spends primary part of phone call discussing SE (see below).  SE: Medications reviewed and pt confirms to have discontinued her Zyprexa at discharge.  Does not feel that she received enough information regarding the purpose of this med and feels that anxiety has improved since discontinuing.  Pt mentions that olanzapine caused increased dry mouth.  Pt states that since Zoloft was changed to am dosing, has noticed improvement to sleep but worsening of \"thoughts too quick\" and \"interrupting\".  Also discusses multiple other SE possibly r/t Zoloft.  Mentions that she is having lower body " "\"jerks\" when she pushes her ankle down.  Notes that this is complicated by her \"spinal issue\" and her PT has witnessed this movement multiple times.  Wonders if Zoloft is contributing.  Pt states that since temp has changed, has been shivering more and wonders if Zoloft is contributing.  Pt also mentions a decrease in libido since starting Zoloft.  Reports that diarrhea was an initial SE to Zoloft, however has not persisted and pt tends to experience constipation more often.  Pt is very focused on SE/med interactions.  Reviews a pharmacy print out with writer and also mentions that she is waiting to hear back from a Pharm D at her genetics company about interactions to turmeric.    Discussion did not seem concerning for SS, however will forward to provider for feedback.    DC Plan:   Admit Date: 1/16/18  Discharge date: 1/18/18  Next appt: 1/25/18  Referrals (therapy, daytx, homecare, ect): None during call, yet previous request for therapy  Crisis Plan: Call 911 or present to ED  Contact Numbers Reviewed: Yes    TCM:  Direct contact made with pt within 2 business days? Yes  Pt is schedule in clinic within 14 days of discharge? Yes  Pt qualifies for TCM visit? Yes      "

## 2018-01-24 ENCOUNTER — TELEPHONE (OUTPATIENT)
Dept: PSYCHIATRY | Facility: CLINIC | Age: 58
End: 2018-01-24

## 2018-01-24 ENCOUNTER — HOSPITAL ENCOUNTER (OUTPATIENT)
Dept: PALLIATIVE MEDICINE | Facility: OTHER | Age: 58
Discharge: HOME | End: 2018-01-24
Attending: PHYSICIAN ASSISTANT

## 2018-01-24 DIAGNOSIS — G89.4 CHRONIC PAIN SYNDROME: ICD-10-CM

## 2018-01-24 DIAGNOSIS — F11.20 OPIOID TYPE DEPENDENCE, CONTINUOUS (H): ICD-10-CM

## 2018-01-24 DIAGNOSIS — M51.379 DEGENERATION OF LUMBAR OR LUMBOSACRAL INTERVERTEBRAL DISC: ICD-10-CM

## 2018-01-24 DIAGNOSIS — M50.30 DEGENERATION OF CERVICAL INTERVERTEBRAL DISC: ICD-10-CM

## 2018-01-24 ASSESSMENT — MIFFLIN-ST. JEOR: SCORE: 1425.44

## 2018-01-24 NOTE — TELEPHONE ENCOUNTER
Janette Dolan Michelle, RN        Phone Number: [phone]                     The pt is the caller. She wants to talk about adjusting Zoloft--can Ir[place] look back at records when Dr. Ray was a resident? She is wondering why is she taking it at night--the hospital said she is supposed to take it in the AM. Causing sleep disturbances. Okay to leave a detailed message. She is available to talk between 11am-1:30pm tomorrow.       Pt appears to be following up from recent care coordination call dated 1/22/18.  Has pending appt on 1/25/18.    Routed to Dr. Gayle to advise

## 2018-01-24 NOTE — TELEPHONE ENCOUNTER
"----- Message from Janette Dolan sent at 1/23/2018 12:48 PM CST -----  Regarding: pt update  Contact: [phone]  The pt is the caller. She wanted to give you an update from the \"genetic company\" re: pharmacogenetics. Okay to leave a detailed voice message.    "

## 2018-01-25 ENCOUNTER — OFFICE VISIT (OUTPATIENT)
Dept: PSYCHIATRY | Facility: CLINIC | Age: 58
End: 2018-01-25
Attending: PSYCHIATRY & NEUROLOGY
Payer: MEDICARE

## 2018-01-25 VITALS
WEIGHT: 187.6 LBS | HEART RATE: 86 BPM | SYSTOLIC BLOOD PRESSURE: 159 MMHG | DIASTOLIC BLOOD PRESSURE: 102 MMHG | BODY MASS INDEX: 30.28 KG/M2

## 2018-01-25 DIAGNOSIS — F33.42 MAJOR DEPRESSIVE DISORDER, RECURRENT, IN FULL REMISSION (H): Primary | ICD-10-CM

## 2018-01-25 PROCEDURE — G0463 HOSPITAL OUTPT CLINIC VISIT: HCPCS | Mod: ZF

## 2018-01-25 ASSESSMENT — PAIN SCALES - GENERAL: PAINLEVEL: SEVERE PAIN (6)

## 2018-01-25 NOTE — TELEPHONE ENCOUNTER
Barbara Ponce Michelle, MALKA                     Pt says that if Dr Gayle can call a pharm d while pt is there then the pharm d can explain.

## 2018-01-25 NOTE — TELEPHONE ENCOUNTER
Ir[place], MD Lefty Oliva Michelle, RN        Caller: Unspecified (Yesterday, 10:44 AM)                     Yes, it is OK for her to take it in the morning.  This may help improve sleep if she was indeed taking it at bedtime before and if it was activating.  I will plan to follow-up on these concerns during our appointment tomorrow as well.  Thanks Annie.       [facility] for pt.  Informed her that she can choose to take the Zoloft in the morning if finds it activating.  Suggested that she discuss this in detail with Dr. Gayle today to determine plan. Reminded of appt time today.

## 2018-01-25 NOTE — NURSING NOTE
Chief Complaint   Patient presents with     Recheck Medication     MDD     Obtained weight, pain level, and smoking status   Blood pressure and and pulse  Reviewed pharmacy preference  Administered abuse screening questions

## 2018-01-25 NOTE — MR AVS SNAPSHOT
After Visit Summary   1/25/2018    Keshia Arellano    MRN: [id number]           Patient Information     Date Of Birth          1960        Visit Information        Provider Department      1/25/2018 3:15 PM Dieter Gayle MD Psychiatry Clinic         Follow-ups after your visit        Your next 10 appointments already scheduled     Feb 09, 2018  2:45 PM [facility]   Adult Med Follow UP with Dieter Gayle MD   Psychiatry Clinic ([facility])    [address]  [address] [facility] [id number] [phone]              Who to contact     Please call your clinic at [phone] to:    Ask questions about your health    Make or cancel appointments    Discuss your medicines    Learn about your test results    Speak to your doctor   If you have compliments or concerns about an experience at your clinic, or if you wish to file a complaint, please contact [facility] Patient Relations at [phone] or email us at [email or web address]         Additional Information About Your Visit        MyChart Information     Emulis gives you secure access to your electronic health record. If you see a primary care provider, you can also send messages to your care team and make appointments. If you have questions, please call your primary care clinic.  If you do not have a primary care provider, please call [phone] and they will assist you.      Emulis is an electronic gateway that provides easy, online access to your medical records. With Em[employer]is, you can request a clinic appointment, read your test results, renew a prescription or communicate with your care team.     To access your existing account, please contact your [facility] or call [phone] for assistance.        Care EveryWhere ID     This is your Care EveryWhere ID. This could be used by other organizations to access your [place]  medical records  [id number]        Your Vitals Were     Pulse BMI (Body Mass Index)                86 30.28 kg/m2           Blood Pressure from Last 3 Encounters:   01/25/18 (!) 159/102   01/17/18 142/73   11/09/17 127/80    Weight from Last 3 Encounters:   01/25/18 85.1 kg (187 lb 9.6 oz)   01/18/18 83.9 kg (185 lb)   11/09/17 95.7 kg (211 lb)              Today, you had the following     No orders found for display         Today's Medication Changes          These changes are accurate as of 1/25/18  5:53 PM.  If you have any questions, ask your nurse or doctor.               These medicines have changed or have updated prescriptions.        Dose/Directions    * morphine 15 MG 12 hr tablet   Commonly known as:  MS CONTIN   This may have changed:  Another medication with the same name was changed. Make sure you understand how and when to take each.        Dose:  15 mg   Take 15 mg by mouth 3 times daily as needed   Refills:  0       * morphine 15 MG IR tablet   Commonly known as:  MSIR   This may have changed:  when to take this   Used for:  Chronic pain        Dose:  7.5-15 mg   Take 0.5-1 tablets (7.5-15 mg) by mouth 4 times daily as needed   Quantity:  1 tablet   Refills:  0       * Notice:  This list has 2 medication(s) that are the same as other medications prescribed for you. Read the directions carefully, and ask your doctor or other care provider to review them with you.      Stop taking these medicines if you haven't already. Please contact your care team if you have questions.     OLANZapine 2.5 MG tablet   Commonly known as:  zyPREXA   Stopped by:  Dieter Gayle MD           OLANZapine 5 MG tablet   Commonly known as:  zyPREXA   Stopped by:  Dieter Gayle MD                    Primary Care Provider Office Phone # Fax #    Janie Melanie Farr [phone]       [facility] [address] [facility] [address]  [facility] [address]        Equal Access to Services     SHYLA AMBRIZ AH:  Hadii aad ku hadmatyo Socarolinaali, waaxda luqadaha, qaybta kaalmada maikel, riddhi medinapadmini kuomarika levi. So [facility] [phone].    ATENCIÓN: Si lelia harvey, tiene a cotton disposición servicios gratuitos de asistencia lingüística. Llame al [phone].    We comply with applicable federal civil rights laws and [place] laws. We do not discriminate on the basis of race, color, national origin, age, disability, sex, sexual orientation, or gender identity.            Thank you!     Thank you for choosing PSYCHIATRY CLINIC  for your care. Our goal is always to provide you with excellent care. Hearing back from our patients is one way we can continue to improve our services. Please take a few minutes to complete the written survey that you may receive in the mail after your visit with us. Thank you!             Your Updated Medication List - Protect others around you: Learn how to safely use, store and throw away your medicines at www.disposemymeds.org.          This list is accurate as of 1/25/18  5:53 PM.  Always use your most recent med list.                   Brand Name Dispense Instructions for use Diagnosis    ACIDOPHILUS PO      1 tablet daily        CHLORTHALIDONE PO      Take 12.5 mg by mouth daily        Cholecalciferol 4000 UNITS Tabs      Take 4,000 Units by mouth daily.        fish oil-omega-3 fatty acids 1000 MG capsule      Take 2 g by mouth daily.        LISINOPRIL PO      Take 5 mg by mouth daily        metoprolol tartrate 25 MG tablet    LOPRESSOR    60 tablet    Take 1 tablet (25 mg) by mouth 2 times daily    Hypertension, unspecified type       * morphine 15 MG 12 hr tablet    MS CONTIN     Take 15 mg by mouth 3 times daily as needed        * morphine 15 MG IR tablet    MSIR    1 tablet    Take 0.5-1 tablets (7.5-15 mg) by mouth 4 times daily as needed    Chronic pain       order for DME     1 Box    Equipment being ordered: Full spectrum 10,000 lux light box (Procedure code ): use 30  min every morning and afternoon in fall and winter months.    Seasonal affective disorder (H)       * other medical supplies      BluLight Therapy        PROTONIX PO      Take 40 mg by mouth every morning (before breakfast)        sertraline 100 MG tablet    ZOLOFT    180 tablet    Take 2 tablets (200 mg) by mouth daily    Major depressive disorder, recurrent, in full remission (H)       traZODone 50 MG tablet    DESYREL    360 tablet    Take 1-4 tablets ( mg) by mouth nightly as needed for sleep    Insomnia       * UNABLE TO FIND      MEDICATION NAME: omega-6        vitamin D 50080 UNIT capsule    ERGOCALCIFEROL    4 capsule    Take 1 capsule by mouth every 7 days.    Vitamin D deficiency       * Notice:  This list has 4 medication(s) that are the same as other medications prescribed for you. Read the directions carefully, and ask your doctor or other care provider to review them with you.
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
## 2018-01-26 ENCOUNTER — RECORDS - HEALTHEAST (OUTPATIENT)
Dept: ADMINISTRATIVE | Facility: OTHER | Age: 58
End: 2018-01-26

## 2018-01-29 ENCOUNTER — OFFICE VISIT (OUTPATIENT)
Dept: PHARMACY | Facility: CLINIC | Age: 58
End: 2018-01-29
Payer: COMMERCIAL

## 2018-01-29 DIAGNOSIS — F33.2 SEVERE EPISODE OF RECURRENT MAJOR DEPRESSIVE DISORDER, WITHOUT PSYCHOTIC FEATURES (H): Primary | ICD-10-CM

## 2018-01-29 DIAGNOSIS — G47.09 OTHER INSOMNIA: ICD-10-CM

## 2018-01-29 DIAGNOSIS — R10.9 STOMACH PAIN: ICD-10-CM

## 2018-01-29 PROCEDURE — 99607 MTMS BY PHARM ADDL 15 MIN: CPT | Performed by: PHARMACIST

## 2018-01-29 PROCEDURE — 99605 MTMS BY PHARM NP 15 MIN: CPT | Performed by: PHARMACIST

## 2018-01-29 NOTE — MR AVS SNAPSHOT
After Visit Summary   1/29/2018    Keshia Arellano    MRN: [id number]           Patient Information     Date Of Birth          1960        Visit Information        Provider Department      1/29/2018 1:30 PM Margaux Nieto [facility]        Care Instructions    Recommendations from today's MTM visit:                                                    MTM (medication therapy management) is a service provided by a clinical pharmacist designed to help you get the most of out of your medicines.     1. I will talk to Dr. Gayle about your medication doses.  I think it would be reasonable to try a sertraline at 100mg and see how your medications balance from there.    2. Your gene results can be helpful in guiding what doses might be helpful and knowing which medications may have some interaction.  It is still important to pay attention to your symptoms since there are many other factors that can affect metabolism.    Next MTM visit: as needed    To schedule another MTM appointment, please call the clinic directly or you may call the MTM scheduling line at [phone] or toll-free at [phone].     My Clinical Pharmacist's contact information:                                                      It was a pleasure seeing you today!  Please feel free to contact me with any questions or concerns you have.      Margaux Nieto, PharmD  Medication Therapy Management Pharmacist  [facility]  Phone: [phone]  Pager: [phone]    You may receive a survey about the MTM services you received.  I would appreciate your feedback to help me serve you better in the future. Please fill it out and return it when you can. Your comments will be anonymous.            Follow-ups after your visit        Your next 10 appointments already scheduled     Feb 09, 2018  2:45 PM [facility]   Adult Med Follow UP with Dieter Gayle MD   Psychiatry Clinic  ([facility])    [address]  [address] [facility] [id number] [phone]              Who to contact     If you have questions or need follow up information about today's clinic visit or your schedule please contact [facility] directly at [phone].  Normal or non-critical lab and imaging results will be communicated to you by MyChart, letter or phone within 4 business days after the clinic has received the results. If you do not hear from us within 7 days, please contact the clinic through [employer] or phone. If you have a critical or abnormal lab result, we will notify you by phone as soon as possible.  Submit refill requests through [employer] or call your pharmacy and they will forward the refill request to us. Please allow 3 business days for your refill to be completed.          Additional Information About Your Visit        [employer] Information     [employer] gives you secure access to your electronic health record. If you see a primary care provider, you can also send messages to your care team and make appointments. If you have questions, please call your primary care clinic.  If you do not have a primary care provider, please call [phone] and they will assist you.        Care EveryWhere ID     This is your Care EveryWhere ID. This could be used by other organizations to access your [facility] medical records  [id number]         Blood Pressure from Last 3 Encounters:   01/25/18 (!) 159/102   01/17/18 142/73   11/09/17 127/80    Weight from Last 3 Encounters:   01/25/18 187 lb 9.6 oz (85.1 kg)   01/18/18 185 lb (83.9 kg)   11/09/17 211 lb (95.7 kg)              Today, you had the following     No orders found for display         Today's Medication Changes          These changes are accurate as of 1/29/18  2:49 PM.  If you have any questions, ask your nurse or doctor.               These medicines have changed or have updated  prescriptions.        Dose/Directions    * morphine 15 MG 12 hr tablet   Commonly known as:  MS CONTIN   This may have changed:  Another medication with the same name was changed. Make sure you understand how and when to take each.        Dose:  15 mg   Take 15 mg by mouth 3 times daily as needed   Refills:  0       * morphine 15 MG IR tablet   Commonly known as:  MSIR   This may have changed:  when to take this   Used for:  Chronic pain        Dose:  7.5-15 mg   Take 0.5-1 tablets (7.5-15 mg) by mouth 4 times daily as needed   Quantity:  1 tablet   Refills:  0       * Notice:  This list has 2 medication(s) that are the same as other medications prescribed for you. Read the directions carefully, and ask your doctor or other care provider to review them with you.             Primary Care Provider Office Phone # Fax #    Janie Melanie Farr [phone]       [facility] [address]  [facility] [address]        Equal Access to Services     [facility] : Hadii yanick fang hadasho Soanuj, waaxda luqadaha, qaybta kaalmada adeegyada, riddhi roberts hayemil matos . So [facility] [phone].    ATENCIÓN: Si habla español, tiene a cotton disposición servicios gratuitos de asistencia lingüística. [facility] [phone].    We comply with applicable federal civil rights laws and [place] laws. We do not discriminate on the basis of race, color, national origin, age, disability, sex, sexual orientation, or gender identity.            Thank you!     Thank you for choosing [facility]  for your care. Our goal is always to provide you with excellent care. Hearing back from our patients is one way we can continue to improve our services. Please take a few minutes to complete the written survey that you may receive in the mail after your visit with us. Thank you!             Your Updated Medication List - Protect others around you: Learn how to safely use, store and  throw away your medicines at www.disposemymeds.org.          This list is accurate as of 1/29/18  2:49 PM.  Always use your most recent med list.                   Brand Name Dispense Instructions for use Diagnosis    ACIDOPHILUS PO      1 tablet daily        CHLORTHALIDONE PO      Take 12.5 mg by mouth daily        Cholecalciferol 4000 UNITS Tabs      Take 4,000 Units by mouth daily.        fish oil-omega-3 fatty acids 1000 MG capsule      Take 2 g by mouth daily.        LISINOPRIL PO      Take 5 mg by mouth daily        metoprolol tartrate 25 MG tablet    LOPRESSOR    60 tablet    Take 1 tablet (25 mg) by mouth 2 times daily    Hypertension, unspecified type       * morphine 15 MG 12 hr tablet    MS CONTIN     Take 15 mg by mouth 3 times daily as needed        * morphine 15 MG IR tablet    MSIR    1 tablet    Take 0.5-1 tablets (7.5-15 mg) by mouth 4 times daily as needed    Chronic pain       order for DME     1 Box    Equipment being ordered: Full spectrum 10,000 lux light box (Procedure code ): use 30 min every morning and afternoon in fall and winter months.    Seasonal affective disorder (H)       * other medical supplies      Blu[employer] Therapy        PROTONIX PO      Take 40 mg by mouth every morning (before breakfast)        sertraline 100 MG tablet    ZOLOFT    180 tablet    Take 2 tablets (200 mg) by mouth daily    Major depressive disorder, recurrent, in full remission (H)       traZODone 50 MG tablet    DESYREL    360 tablet    Take 1-4 tablets ( mg) by mouth nightly as needed for sleep    Insomnia       * UNABLE TO FIND      MEDICATION NAME: omega-6        vitamin D 75108 UNIT capsule    ERGOCALCIFEROL    4 capsule    Take 1 capsule by mouth every 7 days.    Vitamin D deficiency       * Notice:  This list has 4 medication(s) that are the same as other medications prescribed for you. Read the directions carefully, and ask your doctor or other care provider to review them with you.

## 2018-01-29 NOTE — PROGRESS NOTES
"SUBJECTIVE/OBJECTIVE:                           Keshia Arellano is a 57 year old female coming in for an initial visit for Medication Therapy Management.  She was referred to me from Dr. Gayle to help review impact of genetic testing results. She was discharged from the hospital on 1/18 for manic symptoms.  Of note, patient was very fixated on interactions from genetic testing results being the reasoning behind current symptoms.    Chief Complaint: \"Drug interactions caused me to be manic\"    Allergies/ADRs: Reviewed in Epic  Tobacco: No tobacco use  Alcohol: not currently using  Caffeine: not asked  Activity: not asked  PMH: Reviewed in Epic    Medication Adherence/Access: pt reportedly does not miss doses    Depression/Insomnia/Stomach Pain: Pt currently taking sertraline 150mg QAM (decreased from 200mg and switched from QHS about one week ago).  Prior to admission, she had been taking trazodone 200mg QHS and had begun taking three separate turmeric-containing products to help with upset stomach (turmeric rhizome 750mg tea, curcumin 60mg, and Gallo Milk Blend powder for tea), but these have since been discontinued.  She reported three nights without sleeping and some confusion prior to admission, which she reported today are both resolved and mood \"is coming down.\"  Blood pressure during hospitalization reported at 160-210/ (138/95 today).  She had seen PCP for upset stomach and loss of appetite on 12/8/17 and pantoprazole was started after trials of hyoscyamine and dicyclomine were ineffective. Pt noted that pantoprazole had not been effective, so she restarted omeprazoe 20mg and \"my mood was better within an hour.\"  She uses a THC-dominant medical marijuana for pain and is reportedly very effective, though did not bring product to this appointment for dose verification    Current labs include:  BP Readings from Last 3 Encounters:   01/29/18 (!) 138/95   01/25/18 (!) 159/102   01/17/18 142/73     Last Basic " "Metabolic Panel:  Lab Results   Component Value Date     2018      Lab Results   Component Value Date    POTASSIUM 3.5 2018     Lab Results   Component Value Date    CHLORIDE 109 2018     Lab Results   Component Value Date    BUN 13 2018     Lab Results   Component Value Date    CR 0.59 2018     GFR Estimate   Date Value Ref Range Status   2018 >90 >60 mL/min/1.7m2 Final     Comment:     Non  GFR Calc   2011 >90 >60 mL/min/1.7m2 Final   2011 >90 >60 mL/min/1.7m2 Final     TSH   Date Value Ref Range Status   2018 1.20 0.40 - 4.00 mU/L Final     ASSESSMENT:                             Not all current medications were reviewed today. Medicare Part D topics discussed:Drug interactions    Medication Adherence: no issues identified    Depression/Insomnia/Stomach Pain: Genetic testing results ([employer]) were not fully reviewed with patient, but did show the followin.CYP2D6 intermediate metabolizer  2. VOM9V54 rapid metabolizer  3. CYP2C9 normal metabolizer  4. CY Hyperinducer (\"higher than normal level of induction in the presence of an inducer\")    Sertraline is a weak CYP2C9 and 2D6 inhibitor, as well as substrate for 2B6, 2C19, 2C9, 2D6, and 3A4.  Trazodone is a minor substrate of 2D6 and major substrate of 3A4  Pantoprazole is a minor substrate of 2D6, 3A4 and a major substrate of 2C19  Omeprazole is a moderate inhibitor of 2C19, 2C9 and a substrate of 2C19 (major), 2A6, 2C9, 2D6, 3A4  Turmeric- no found CYP markers, though does hold some evidence of having MAOI activity    Reviewed pertinent genetic results.  Pt's conflicting intermediate 2D6 and rapid 2C19 metabolism makes it difficult to predict likely serum concentrations of sertraline.  Due to 2C19 rapid metabolism, it is possible that 2C19 inhibition by omeprazole could increase sertraline serum levels, though likely not felt within an hour of taking medication. Unable to identify " any other genetic variant/drug interactions that could have caused patient's symptoms.  Though sertraline plus trazodone had not previously been problematic, it is possible that MAOI activity from large quantities of turmeric had an effect on increasing serotonin activity.  Encouraged patient not to use turmeric-containing products, especially in high quantity, and recommended she check with a pharmacist prior to starting major supplementation. Her THC-dominant marijuana is more psychoactive than CBD strains and could have been an additional contributing factor. Further discussed that her large quantities of supplements may have been contributing to stomach pain.    Per request from Dr. Gayle, reviewed metabolism for mood stabilizers.  Lithium, gabapentin, and topiramate have no genetic markers and lamotrigine is not affected by CYP enzymes.  Depakote: minor 2C19 substrate, weak 2C9 inhibitor (may inhibit some sertraline metabolism)  Tegretol: major 3A4 substrate, major 3A4 inducer (may induce sertraline, omeprazole, and trazodone metabolism)  Trileptal: weak 3A4 inducer    PLAN:                            1. Pt will discontinue turmeric-containing products.  2. Follow up with psychiatrist in one week.    Future: review blood pressure medications    I spent 60 minutes with this patient today (an extra 15 minutes was spent creating the Medication Action Plan). A copy of the visit note was provided to the patient's psychiatrist.    Will follow up in 1-2 months.    The patient was given a summary of these recommendations as an after visit summary.     Margaux Nieto, PharmD  Medication Therapy Management Pharmacist  [facility]  Phone: [phone]  Pager: [phone]

## 2018-01-29 NOTE — PATIENT INSTRUCTIONS
Recommendations from today's MTM visit:                                                    MTM (medication therapy management) is a service provided by a clinical pharmacist designed to help you get the most of out of your medicines.     1. I will talk to Dr. Gayle about your medication doses.    2. Your gene results can be helpful in guiding what doses might be helpful and knowing which medications may have some interaction.  It is still important to pay attention to your symptoms since there are many other factors that can affect metabolism.    3. Stop taking turmeric-containing products, which could interact with your medication.    Next MTM visit: 1-2 months    To schedule another MTM appointment, please call the clinic directly or you may call the MTM scheduling line at [phone] or toll-free at [phone].     My Clinical Pharmacist's contact information:                                                      It was a pleasure seeing you today!  Please feel free to contact me with any questions or concerns you have.      Margaux Nieto, PharmD  Medication Therapy Management Pharmacist  [facility]  Phone: [phone]  Pager: [phone]    You may receive a survey about the MTM services you received.  I would appreciate your feedback to help me serve you better in the future. Please fill it out and return it when you can. Your comments will be anonymous.

## 2018-01-29 NOTE — LETTER
"     [facility]     Date: 2018    Keshia Arellano  [address]   [facility] [address]    Dear Ms. Arellano,    Thank you for talking with me on 18 about your health and medications. Medicare s MTM (Medication Therapy Management) program helps you understand your medications and use them safely.      This letter includes an action plan (Medication Action Plan) and medication list (Personal Medication List). The action plan has steps you should take to help you get the best results from your medications. The medication list will help you keep track of your medications and how to use them the right way.       Have your action plan and medication list with you when you talk with your doctors, pharmacists, and other healthcare providers in your care team.     Ask your doctors, pharmacists, and other healthcare providers to update the action plan and medication list at every visit.     Take your medication list with you if you go to the hospital or emergency room.     Give a copy of the action plan and medication list to your family or caregivers.     If you want to talk about this letter or any of the papers with it, please call   [phone].   We look forward to working with you, your doctors, and other healthcare providers to help you stay healthy.     Sincerely,    Margaux Nieto      Enclosed: Medication Action Plan and Personal Medication List    MEDICATION ACTION PLAN FOR Keshia Arellano,  1960     This action plan will help you get the best results from your medications if you:   1. Read \"What we talked about.\"   2. Take the steps listed in the \"What I need to do\" boxes.   3. Fill in \"What I did and when I did it.\"   4. Fill in \"My follow-up plan\" and \"Questions I want to ask.\"     Have this action plan with you when you talk with your doctors, pharmacists, and other healthcare providers in your care team. Share this with your family or " caregivers too.  DATE PREPARED: 2018  What we talked about: High amounts of turmeric may interact with your medication                                                 What I need to do: Stop taking turmeric-containing products       What I did and when I did it:                                              My follow-up plan:                 Questions I want to ask:              If you have any questions about your action plan, call Margaux Nieto, Phone: [phone] , Monday-Friday 8-4:30pm.           MEDICATION LIST FOR Keshia Arellano  1960     This medication list was made for you after we talked. We also used information from your doctor's chart.      Use blank rows to add new medications. Then fill in the dates you started using them.    Cross out medications when you no longer use them. Then write the date and why you stopped using them.    Ask your doctors, pharmacists, and other healthcare providers to update this list at every visit. Keep this list up-to-date with:       Prescription medications    Over the counter drugs     Herbals    Vitamins    Minerals      If you go to the hospital or emergency room, take this list with you. Share this with your family or caregivers too.     DATE PREPARED: 2018  Allergies or side effects: Biaxin [clarithromycin]; Cleocin; Perfume; Proventil [albuterol sulfate]; Tobramycin; Amoxicillin; and Tape [adhesive tape]     Medication:  ACIDOPHILUS OR      How I use it:  1 tablet daily      Why I use it:      Prescriber:  Patient Reported      Date I started using it:       Date I stopped using it:         Why I stopped using it:            Medication:  CHLORTHALIDONE PO      How I use it:  Take 12.5 mg by mouth daily      Why I use it:      Prescriber:  Patient Reported      Date I started using it:       Date I stopped using it:         Why I stopped using it:            Medication:  LISINOPRIL PO      How I use it:  Take 5 mg by mouth daily      Why I use  it:      Prescriber:  Patient Reported      Date I started using it:       Date I stopped using it:         Why I stopped using it:            Medication:  MEDICAL CANNABIS CAPSULE      How I use it:  1 capsule (This is NOT a prescription, and does not certify that the patient has a qualifying medical condition for medical cannabis.  The purpose of this order is  to document that the patient reports taking medical cannabis.)      Why I use it:      Prescriber:  Patient Reported      Date I started using it:       Date I stopped using it:         Why I stopped using it:            Medication:  METOPROLOL TARTRATE 25 MG PO TABS      How I use it:  Take 1 tablet (25 mg) by mouth 2 times daily      Why I use it: Hypertension, unspecified type    Prescriber:  DOMINGA Sunshine CNP      Date I started using it:       Date I stopped using it:         Why I stopped using it:            Medication:  MORPHINE SULFATE 15 MG PO TABS      How I use it:  Take 0.5-1 tablets (7.5-15 mg) by mouth 4 times daily as needed      Why I use it: Chronic pain    Prescriber:  Kerwin Chang MD      Date I started using it:       Date I stopped using it:         Why I stopped using it:            Medication:  MORPHINE SULFATE ER 15 MG PO TB12      How I use it:  Take 15 mg by mouth 3 times daily as needed       Why I use it:      Prescriber:  Patient Reported      Date I started using it:       Date I stopped using it:         Why I stopped using it:            Medication:  OMEGA-3 FATTY ACIDS 1000 MG PO CAPS      How I use it:  Take 2 g by mouth daily.      Why I use it:      Prescriber:  Patient Reported      Date I started using it:       Date I stopped using it:         Why I stopped using it:            Medication:  OMEPRAZOLE 20 MG PO CPDR      How I use it:  Take 20 mg by mouth daily      Why I use it:      Prescriber:  Patient Reported      Date I started using it:       Date I stopped using it:         Why I  stopped using it:            Medication:  SERTRALINE  MG PO TABS      How I use it:  Take 150 mg by mouth daily      Why I use it:      Prescriber:  Patient Reported      Date I started using it:       Date I stopped using it:         Why I stopped using it:            Medication:         How I use it:         Why I use it:      Prescriber:         Date I started using it:       Date I stopped using it:         Why I stopped using it:            Medication:         How I use it:         Why I use it:      Prescriber:         Date I started using it:       Date I stopped using it:         Why I stopped using it:            Medication:         How I use it:         Why I use it:      Prescriber:         Date I started using it:       Date I stopped using it:         Why I stopped using it:              Other Information:     If you have any questions about your action plan, call [phone].    According to the Paperwork Reduction Act of 1995, no persons are required to respond to a collection of information unless it displays a valid OMB control number. The valid OMB number for this information collection is [id number]. The time required to complete this information collection is estimated to average 40 minutes per response, including the time to review instructions, searching existing data resources, gather the data needed, and complete and review the information collection. If you have any comments concerning the accuracy of the time estimate(s) or suggestions for improving this form, please write to: CMS, Attn: CARLOS Reports Clearance Officer, [address] [place] [phone].

## 2018-01-30 VITALS — SYSTOLIC BLOOD PRESSURE: 138 MMHG | DIASTOLIC BLOOD PRESSURE: 95 MMHG | HEART RATE: 83 BPM

## 2018-01-31 ENCOUNTER — TELEPHONE (OUTPATIENT)
Dept: PSYCHIATRY | Facility: CLINIC | Age: 58
End: 2018-01-31

## 2018-01-31 NOTE — TELEPHONE ENCOUNTER
"Provider Note:    Called patient today to check in on her symptoms, as was planned after last appointment.  No answer but LVM for pt to call back if she was having any concerns.  Of note, did briefly see pt in person prior to her pharmacy visit on 1/29/18 and pt indicated she was \"feeling better\" and \"winding down\" at that time.  Have spoken with pharmacist, Margaux Nieto, and supervisor, Dr. Tran, regarding this case.    Dieter Gayle MD  PGY-4  "

## 2018-02-01 ENCOUNTER — TELEPHONE (OUTPATIENT)
Dept: PSYCHIATRY | Facility: CLINIC | Age: 58
End: 2018-02-01

## 2018-02-01 RX ORDER — SERTRALINE HYDROCHLORIDE 100 MG/1
150 TABLET, FILM COATED ORAL DAILY
COMMUNITY
End: 2018-03-12

## 2018-02-01 NOTE — TELEPHONE ENCOUNTER
Last appt: 1/26/18    Per last office note:  Pt will talk to her PCP to get a PA for referral to her previous therapist - Dr. Tiesha Parr at [facility].    Routed to Dr. Gayle

## 2018-02-01 NOTE — TELEPHONE ENCOUNTER
----- Message from Dolores Olivares sent at 1/30/2018  2:54 PM CST -----  Regarding: Prior Authorization/[place]  Contact: [phone]  Pt is the caller. She said her PCP's office called. They checked with [facility] and it is you (Dr baldwin) that needs to do the Prior Authorization to see Dr Tiesha Bernard at [facility]. She is out of network to get it paid.    Dr Ray had to do this before in 2010/2011 from our clinic as well.  [employer] [facility] told pt, you will need to go to their portal in order to complete the prior authorization.

## 2018-02-01 NOTE — TELEPHONE ENCOUNTER
"Colby Camarillo Michelle, RN        Phone Number: [phone]                     Pt says:     \"Sorry I missed your call, I think I'll be ok to wait until the appointment to make further medication adjustments.  If not I will call.\"     He doesn't need to call back, wanted to leave message       Routed to Dr. Gayle  "

## 2018-02-06 NOTE — TELEPHONE ENCOUNTER
Dr. Gayle is agreeable to this PA.  Uncertain how to proceed with PA.    Message sent to PA team to see if they assist with this process.

## 2018-02-09 ENCOUNTER — OFFICE VISIT (OUTPATIENT)
Dept: PSYCHIATRY | Facility: CLINIC | Age: 58
End: 2018-02-09
Attending: PSYCHIATRY & NEUROLOGY
Payer: MEDICARE

## 2018-02-09 VITALS
SYSTOLIC BLOOD PRESSURE: 123 MMHG | HEART RATE: 71 BPM | WEIGHT: 187.8 LBS | BODY MASS INDEX: 30.31 KG/M2 | DIASTOLIC BLOOD PRESSURE: 76 MMHG

## 2018-02-09 DIAGNOSIS — G47.00 INSOMNIA, UNSPECIFIED TYPE: ICD-10-CM

## 2018-02-09 DIAGNOSIS — F33.42 MAJOR DEPRESSIVE DISORDER, RECURRENT, IN FULL REMISSION (H): Primary | ICD-10-CM

## 2018-02-09 PROCEDURE — G0463 HOSPITAL OUTPT CLINIC VISIT: HCPCS | Mod: ZF

## 2018-02-09 RX ORDER — TRAZODONE HYDROCHLORIDE 50 MG/1
150 TABLET, FILM COATED ORAL
Qty: 270 TABLET | Refills: 3 | Status: SHIPPED | OUTPATIENT
Start: 2018-02-09 | End: 2021-08-31

## 2018-02-09 ASSESSMENT — PAIN SCALES - GENERAL: PAINLEVEL: SEVERE PAIN (6)

## 2018-02-09 NOTE — NURSING NOTE
Chief Complaint   Patient presents with     Recheck Medication     MDD     Reviewed allergies, medications, pharmacy and smoking status.    Obtained weight, pain level, blood pressure and heart rate
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
## 2018-02-09 NOTE — MR AVS SNAPSHOT
After Visit Summary   2/9/2018    Keshia Arellano    MRN: [id number]           Patient Information     Date Of Birth          1960        Visit Information        Provider Department      2/9/2018 2:45 PM Dieter Gayle MD Psychiatry Clinic        Today's Diagnoses     Insomnia, unspecified type    -  1       Follow-ups after your visit        Your next 10 appointments already scheduled     Mar 12, 2018  2:45 PM CDT   Adult Med Follow UP with Dieter Gayle MD   Psychiatry Clinic ([facility])    [address] [facility] [address] [phone]              Who to contact     Please call your clinic at [phone] to:    Ask questions about your health    Make or cancel appointments    Discuss your medicines    Learn about your test results    Speak to your doctor            Additional Information About Your Visit        MyChart Information     [employer] gives you secure access to your electronic health record. If you see a primary care provider, you can also send messages to your care team and make appointments. If you have questions, please call your primary care clinic.  If you do not have a primary care provider, please call [phone] and they will assist you.      [employer] is an electronic gateway that provides easy, online access to your medical records. With [employer], you can request a clinic appointment, read your test results, renew a prescription or communicate with your care team.     To access your existing account, please contact your [facility] or call [phone] for assistance.        Care EveryWhere ID     This is your Care EveryWhere ID. This could be used by other organizations to access your [facility] medical records  [id number]        Your Vitals Were     Pulse BMI (Body Mass Index)                71 30.31 kg/m2           Blood Pressure from Last 3 Encounters:   02/09/18  123/76   01/29/18 (!) 138/95   01/25/18 (!) 159/102    Weight from Last 3 Encounters:   02/09/18 85.2 kg (187 lb 12.8 oz)   01/25/18 85.1 kg (187 lb 9.6 oz)   01/18/18 83.9 kg (185 lb)              Today, you had the following     No orders found for display         Today's Medication Changes          These changes are accurate as of 2/9/18  4:19 PM.  If you have any questions, ask your nurse or doctor.               Start taking these medicines.        Dose/Directions    traZODone 50 MG tablet   Commonly known as:  DESYREL   Used for:  Insomnia, unspecified type   Started by:  Dieter Gayle MD        Dose:  150 mg   Take 3 tablets (150 mg) by mouth nightly as needed for sleep   Quantity:  270 tablet   Refills:  3         These medicines have changed or have updated prescriptions.        Dose/Directions    * morphine 15 MG 12 hr tablet   Commonly known as:  MS CONTIN   This may have changed:  Another medication with the same name was changed. Make sure you understand how and when to take each.        Dose:  15 mg   Take 15 mg by mouth 3 times daily as needed   Refills:  0       * morphine 15 MG IR tablet   Commonly known as:  MSIR   This may have changed:  when to take this   Used for:  Chronic pain        Dose:  7.5-15 mg   Take 0.5-1 tablets (7.5-15 mg) by mouth 4 times daily as needed   Quantity:  1 tablet   Refills:  0       * Notice:  This list has 2 medication(s) that are the same as other medications prescribed for you. Read the directions carefully, and ask your doctor or other care provider to review them with you.         Where to get your medicines      These medications were sent to [facility] - [place], [place] - [address] [facility] [address]  [address] [facility] [address], [place] [address]     Phone:  [phone]     traZODone 50 MG tablet                Primary Care Provider Office Phone # Fax #    Janie Melanie Farr [phone]       [place] [address]  [address]  [facility] [address]        Equal Access to Services     EVELYNEYENNY KATINA : Hadii yanick ku haddaryl Socarolinaali, waaxda luqadaha, qaybta kaalmada sydneycarrie, riddhi alejandra viridianamarika grimeskoffi mckinnonsimeon matos . So [facility] [phone].    ATENCIÓN: Si habla español, tiene a cotton disposición servicios gratuitos de asistencia lingüística. [facility] [phone].    We comply with applicable federal civil rights laws and [place] laws. We do not discriminate on the basis of race, color, national origin, age, disability, sex, sexual orientation, or gender identity.            Thank you!     Thank you for choosing PSYCHIATRY CLINIC  for your care. Our goal is always to provide you with excellent care. Hearing back from our patients is one way we can continue to improve our services. Please take a few minutes to complete the written survey that you may receive in the mail after your visit with us. Thank you!             Your Updated Medication List - Protect others around you: Learn how to safely use, store and throw away your medicines at www.disposemymeds.org.          This list is accurate as of 2/9/18  4:19 PM.  Always use your most recent med list.                   Brand Name Dispense Instructions for use Diagnosis    ACIDOPHILUS PO      1 tablet daily        CHLORTHALIDONE PO      Take 12.5 mg by mouth daily        fish oil-omega-3 fatty acids 1000 MG capsule      Take 2 g by mouth daily.        LISINOPRIL PO      Take 5 mg by mouth daily        medical cannabis (Patient's own supply.  Not a prescription)      1 capsule (This is NOT a prescription, and does not certify that the patient has a qualifying medical condition for medical cannabis.  The purpose of this order is  to document that the patient reports taking medical cannabis.)        metoprolol tartrate 25 MG tablet    LOPRESSOR    60 tablet    Take 1 tablet (25 mg) by mouth 2 times daily    Hypertension, unspecified type       * morphine 15 MG 12 hr tablet    MS  CONTIN     Take 15 mg by mouth 3 times daily as needed        * morphine 15 MG IR tablet    MSIR    1 tablet    Take 0.5-1 tablets (7.5-15 mg) by mouth 4 times daily as needed    Chronic pain       omeprazole 20 MG CR capsule    priLOSEC     Take 20 mg by mouth daily        other medical supplies      BluLight Therapy        sertraline 100 MG tablet    ZOLOFT     Take 150 mg by mouth daily        traZODone 50 MG tablet    DESYREL    270 tablet    Take 3 tablets (150 mg) by mouth nightly as needed for sleep    Insomnia, unspecified type       * Notice:  This list has 2 medication(s) that are the same as other medications prescribed for you. Read the directions carefully, and ask your doctor or other care provider to review them with you.

## 2018-02-12 NOTE — TELEPHONE ENCOUNTER
Kathie Vizcaino Michelle, RN        Caller: Unspecified (1 week ago)                     Hi, we only do PA's for prescription medications. But, if she is out of network, the pt will need to get a referral from her primary Dr to see a Dr that is out of network. Sorry I can not assist further.     Thank you,       Writer went to [employer] website and obtained:  Continued Outpatient Treatment Prior Authorization Request Form    Form partially completed and routed to Dr. Gayle

## 2018-02-13 ASSESSMENT — PATIENT HEALTH QUESTIONNAIRE - PHQ9: SUM OF ALL RESPONSES TO PHQ QUESTIONS 1-9: 9

## 2018-02-26 ENCOUNTER — COMMUNICATION - HEALTHEAST (OUTPATIENT)
Dept: PALLIATIVE MEDICINE | Facility: OTHER | Age: 58
End: 2018-02-26

## 2018-02-26 DIAGNOSIS — G89.4 CHRONIC PAIN SYNDROME: ICD-10-CM

## 2018-03-12 ENCOUNTER — OFFICE VISIT (OUTPATIENT)
Dept: PSYCHIATRY | Facility: CLINIC | Age: 58
End: 2018-03-12
Attending: PSYCHIATRY & NEUROLOGY
Payer: MEDICARE

## 2018-03-12 VITALS
HEART RATE: 75 BPM | BODY MASS INDEX: 30.7 KG/M2 | DIASTOLIC BLOOD PRESSURE: 84 MMHG | SYSTOLIC BLOOD PRESSURE: 128 MMHG | WEIGHT: 190.2 LBS

## 2018-03-12 DIAGNOSIS — F33.0 MILD EPISODE OF RECURRENT MAJOR DEPRESSIVE DISORDER (H): Primary | ICD-10-CM

## 2018-03-12 DIAGNOSIS — F43.10 POSTTRAUMATIC STRESS DISORDER: ICD-10-CM

## 2018-03-12 PROBLEM — Z72.89 SELF-INJURIOUS BEHAVIOR: Status: RESOLVED | Noted: 2018-01-16 | Resolved: 2018-03-12

## 2018-03-12 PROCEDURE — G0463 HOSPITAL OUTPT CLINIC VISIT: HCPCS | Mod: ZF

## 2018-03-12 RX ORDER — SERTRALINE HYDROCHLORIDE 100 MG/1
150 TABLET, FILM COATED ORAL DAILY
Qty: 135 TABLET | Refills: 1 | Status: SHIPPED | OUTPATIENT
Start: 2018-03-12 | End: 2018-06-22

## 2018-03-12 ASSESSMENT — PAIN SCALES - GENERAL: PAINLEVEL: MODERATE PAIN (5)

## 2018-03-12 NOTE — MR AVS SNAPSHOT
After Visit Summary   3/12/2018    Keshia Arellano    MRN: [id number]           Patient Information     Date Of Birth          1960        Visit Information        Provider Department      3/12/2018 2:45 PM Dieter Gayle MD Psychiatry Clinic        Today's Diagnoses     Mild episode of recurrent major depressive disorder (H)    -  1    Posttraumatic stress disorder           Follow-ups after your visit        Your next 10 appointments already scheduled     Apr 23, 2018  3:15 PM CDT   Adult Med Follow UP with Dieter Gayle MD   Psychiatry Clinic ([facility])    [place] [id number]  [address] [id number] [phone]              Who to contact     Please call your clinic at [phone] to:    Ask questions about your health    Make or cancel appointments    Discuss your medicines    Learn about your test results    Speak to your doctor            Additional Information About Your Visit        MyChart Information     [employer] gives you secure access to your electronic health record. If you see a primary care provider, you can also send messages to your care team and make appointments. If you have questions, please call your primary care clinic.  If you do not have a primary care provider, please call [phone] and they will assist you.      [employer] is an electronic gateway that provides easy, online access to your medical records. With [employer], you can request a clinic appointment, read your test results, renew a prescription or communicate with your care team.     To access your existing account, please contact your [facility] or call [phone] for assistance.        Care EveryWhere ID     This is your Care EveryWhere ID. This could be used by other organizations to access your [facility] medical records  [id number]        Your Vitals Were     Pulse BMI (Body Mass Index)                75 30.7  kg/m2           Blood Pressure from Last 3 Encounters:   03/12/18 128/84   02/09/18 123/76   01/29/18 (!) 138/95    Weight from Last 3 Encounters:   03/12/18 86.3 kg (190 lb 3.2 oz)   02/09/18 85.2 kg (187 lb 12.8 oz)   01/25/18 85.1 kg (187 lb 9.6 oz)              Today, you had the following     No orders found for display         Today's Medication Changes          These changes are accurate as of 3/12/18 11:59 PM.  If you have any questions, ask your nurse or doctor.               These medicines have changed or have updated prescriptions.        Dose/Directions    * morphine 15 MG 12 hr tablet   Commonly known as:  MS CONTIN   This may have changed:  Another medication with the same name was changed. Make sure you understand how and when to take each.        Dose:  15 mg   Take 15 mg by mouth 3 times daily as needed   Refills:  0       * morphine 15 MG IR tablet   Commonly known as:  MSIR   This may have changed:  when to take this   Used for:  Chronic pain        Dose:  7.5-15 mg   Take 0.5-1 tablets (7.5-15 mg) by mouth 4 times daily as needed   Quantity:  1 tablet   Refills:  0       * Notice:  This list has 2 medication(s) that are the same as other medications prescribed for you. Read the directions carefully, and ask your doctor or other care provider to review them with you.         Where to get your medicines      These medications were sent to [facility] - [place], [place] - [address] [facility] [address]  [address] [facility] [address], [place] [address]     Phone:  [phone]     sertraline 100 MG tablet                Primary Care Provider Office Phone # Fax #    Janie Melanie Farr [phone]       [facility] [address]  [facility] [address]        Equal Access to Services     SHYLA AMBRIZ AH: Mimi dsouza Soanuj, waaxda luqadaha, qaybta kaalmada adeegyada, riddhi sims. So [facility] [phone].    ATENCIÓN: Si  lelia harvey, tiene a cotton disposición servicios gratuitos de asistencia lingüística. Chang carmichael [phone].    We comply with applicable federal civil rights laws and [place] laws. We do not discriminate on the basis of race, color, national origin, age, disability, sex, sexual orientation, or gender identity.            Thank you!     Thank you for choosing PSYCHIATRY CLINIC  for your care. Our goal is always to provide you with excellent care. Hearing back from our patients is one way we can continue to improve our services. Please take a few minutes to complete the written survey that you may receive in the mail after your visit with us. Thank you!             Your Updated Medication List - Protect others around you: Learn how to safely use, store and throw away your medicines at www.disposemymeds.org.          This list is accurate as of 3/12/18 11:59 PM.  Always use your most recent med list.                   Brand Name Dispense Instructions for use Diagnosis    ACIDOPHILUS PO      1 tablet daily        CHLORTHALIDONE PO      Take 12.5 mg by mouth daily        fish oil-omega-3 fatty acids 1000 MG capsule      Take 2 g by mouth daily.        LISINOPRIL PO      Take 5 mg by mouth daily        medical cannabis (Patient's own supply.  Not a prescription)      1 capsule (This is NOT a prescription, and does not certify that the patient has a qualifying medical condition for medical cannabis.  The purpose of this order is  to document that the patient reports taking medical cannabis.)        metoprolol tartrate 25 MG tablet    LOPRESSOR    60 tablet    Take 1 tablet (25 mg) by mouth 2 times daily    Hypertension, unspecified type       * morphine 15 MG 12 hr tablet    MS CONTIN     Take 15 mg by mouth 3 times daily as needed        * morphine 15 MG IR tablet    MSIR    1 tablet    Take 0.5-1 tablets (7.5-15 mg) by mouth 4 times daily as needed    Chronic pain       omeprazole 20 MG CR capsule    priLOSEC     Take  20 mg by mouth daily        other medical supplies      Blu[employer] Therapy        sertraline 100 MG tablet    ZOLOFT    135 tablet    Take 1.5 tablets (150 mg) by mouth daily    Mild episode of recurrent major depressive disorder (H)       traZODone 50 MG tablet    DESYREL    270 tablet    Take 3 tablets (150 mg) by mouth nightly as needed for sleep    Insomnia, unspecified type       * Notice:  This list has 2 medication(s) that are the same as other medications prescribed for you. Read the directions carefully, and ask your doctor or other care provider to review them with you.

## 2018-03-12 NOTE — NURSING NOTE
Chief Complaint   Patient presents with     Recheck Medication     MDD     Reviewed allergies, medications, pharmacy and smoking status.  Administered abuse screening questions   Obtained weight, pain level, blood pressure and heart rate
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
## 2018-03-14 NOTE — TELEPHONE ENCOUNTER
Dolores Olivares Michelle, RN        Phone Number: [phone]                     Odilia, from [employer] is the caller. She said Dr Gayle faxed over a Prior Authorization for this patient to see Tiesha Parr at [facility]. The Insurance rep is looking for a little more information. Ok to leave detailed message.       Routed to Dr. Gayle to see if able to provide needed clinical information.

## 2018-03-19 ENCOUNTER — TELEPHONE (OUTPATIENT)
Dept: PSYCHIATRY | Facility: CLINIC | Age: 58
End: 2018-03-19

## 2018-03-19 NOTE — TELEPHONE ENCOUNTER
Provider Note:    Called back Odilia at [facility] regarding her request to speak with this provider regarding recent Prior Authorization form for pt to see Tiesha Parr at [facility].  Tried calling x 2 with no answer.  Did LVM to call our clinic back with questions.    This provider also spoke with patient (Keshia) to provide update.  Keshia also inquired about possible process to be refunded for medication she was sent home with from the hospital but never used.  This provider did call discharge pharmacy to pass along this question and was told that if patient took the medication home (even if not used) that they do not reimburse for unused medications.  Pharmacy was also unaware of any process provided by the inpatient psychiatric unit to assist in refunding for unused medication.    Pt has expressed frustration with her care while on the inpatient unit and the difficulty in trying to get refunded for the medication she did not use.  Informed pt she could consider contacting patient relations regarding these concerns and inquire about next steps.  Pt was given the phone number for patient relations.    Dieter Gayle MD  PGY-4

## 2018-03-20 ENCOUNTER — HOSPITAL ENCOUNTER (OUTPATIENT)
Dept: PALLIATIVE MEDICINE | Facility: OTHER | Age: 58
Discharge: HOME | End: 2018-03-20
Attending: PHYSICIAN ASSISTANT

## 2018-03-20 DIAGNOSIS — G89.4 CHRONIC PAIN SYNDROME: ICD-10-CM

## 2018-03-20 ASSESSMENT — MIFFLIN-ST. JEOR: SCORE: 1425.44

## 2018-03-21 NOTE — TELEPHONE ENCOUNTER
3/19/18- 2:50 pm    Janette Dolan Michelle, RN        Phone Number: [phone]                     [facility] is the caller. He said that they faxed over a denial for the pt to see an out-of-network provider. If you need to c/b, you can speak with anybody who answers.              Writer unable to locate form.    Routed to Dr. Gayle

## 2018-03-21 NOTE — TELEPHONE ENCOUNTER
Rec'd incoming fax from [employer]:  Notice of Denial of Medical Coverage.      Denial states that requested provider is not in-network under pt's benefit plan and that thre are in-network providers that can provider treatment for depression.  They share in-network Medicare Providers including:  - [facility] [phone]  - [facility] [phone]    Forms sent to scanning.    Routed to Dr. Gayle as KARLOS

## 2018-04-10 ENCOUNTER — HOSPITAL ENCOUNTER (OUTPATIENT)
Dept: PALLIATIVE MEDICINE | Facility: OTHER | Age: 58
Discharge: HOME | End: 2018-04-10
Attending: PHYSICIAN ASSISTANT

## 2018-04-10 DIAGNOSIS — G89.4 CHRONIC PAIN SYNDROME: ICD-10-CM

## 2018-04-10 DIAGNOSIS — G03.9 ARACHNOIDITIS: ICD-10-CM

## 2018-04-10 DIAGNOSIS — F11.20 OPIOID TYPE DEPENDENCE, CONTINUOUS (H): ICD-10-CM

## 2018-04-10 DIAGNOSIS — Z79.899 MEDICAL CANNABIS USE: ICD-10-CM

## 2018-04-10 DIAGNOSIS — M25.50 PAIN IN JOINT, MULTIPLE SITES: ICD-10-CM

## 2018-04-10 ASSESSMENT — MIFFLIN-ST. JEOR: SCORE: 1425.44

## 2018-04-17 ENCOUNTER — TRANSFERRED RECORDS (OUTPATIENT)
Dept: HEALTH INFORMATION MANAGEMENT | Facility: CLINIC | Age: 58
End: 2018-04-17

## 2018-04-17 ENCOUNTER — RECORDS - HEALTHEAST (OUTPATIENT)
Dept: ADMINISTRATIVE | Facility: OTHER | Age: 58
End: 2018-04-17

## 2018-04-23 ENCOUNTER — OFFICE VISIT (OUTPATIENT)
Dept: PSYCHIATRY | Facility: CLINIC | Age: 58
End: 2018-04-23
Attending: PSYCHIATRY & NEUROLOGY
Payer: MEDICARE

## 2018-04-23 VITALS
WEIGHT: 195.2 LBS | BODY MASS INDEX: 31.51 KG/M2 | HEART RATE: 72 BPM | SYSTOLIC BLOOD PRESSURE: 148 MMHG | DIASTOLIC BLOOD PRESSURE: 81 MMHG

## 2018-04-23 DIAGNOSIS — F33.0 MILD EPISODE OF RECURRENT MAJOR DEPRESSIVE DISORDER (H): Primary | ICD-10-CM

## 2018-04-23 PROCEDURE — G0463 HOSPITAL OUTPT CLINIC VISIT: HCPCS | Mod: ZF

## 2018-04-23 ASSESSMENT — PAIN SCALES - GENERAL: PAINLEVEL: SEVERE PAIN (7)
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
## 2018-04-23 NOTE — MR AVS SNAPSHOT
After Visit Summary   4/23/2018    Keshia Arellano    MRN: [id number]           Patient Information     Date Of Birth          1960        Visit Information        Provider Department      4/23/2018 3:15 PM Dieter Gayle MD Psychiatry Clinic        Today's Diagnoses     Mild episode of recurrent major depressive disorder (H)    -  1       Follow-ups after your visit        Your next 10 appointments already scheduled     Jun 18, 2018  2:45 PM CDT   Adult Med Follow UP with Dieter Gayle MD   Psychiatry Clinic ([facility])    [place] [id number]  [address] [id number] [phone]              Who to contact     Please call your clinic at [phone] to:    Ask questions about your health    Make or cancel appointments    Discuss your medicines    Learn about your test results    Speak to your doctor            Additional Information About Your Visit        MyChart Information     [employer] gives you secure access to your electronic health record. If you see a primary care provider, you can also send messages to your care team and make appointments. If you have questions, please call your primary care clinic.  If you do not have a primary care provider, please call [phone] and they will assist you.      [employer] is an electronic gateway that provides easy, online access to your medical records. With [employer], you can request a clinic appointment, read your test results, renew a prescription or communicate with your care team.     To access your existing account, please contact your [facility] or call [phone] for assistance.        Care EveryWhere ID     This is your Care EveryWhere ID. This could be used by other organizations to access your [place] medical records  [id number]        Your Vitals Were     Pulse BMI (Body Mass Index)                72 31.51 kg/m2           Blood Pressure from  Last 3 Encounters:   04/23/18 148/81   03/12/18 128/84   02/09/18 123/76    Weight from Last 3 Encounters:   04/23/18 88.5 kg (195 lb 3.2 oz)   03/12/18 86.3 kg (190 lb 3.2 oz)   02/09/18 85.2 kg (187 lb 12.8 oz)              Today, you had the following     No orders found for display         Today's Medication Changes          These changes are accurate as of 4/23/18 11:59 PM.  If you have any questions, ask your nurse or doctor.               These medicines have changed or have updated prescriptions.        Dose/Directions    * morphine 15 MG 12 hr tablet   Commonly known as:  MS CONTIN   This may have changed:  Another medication with the same name was changed. Make sure you understand how and when to take each.        Dose:  15 mg   Take 15 mg by mouth 3 times daily as needed   Refills:  0       * morphine 15 MG IR tablet   Commonly known as:  MSIR   This may have changed:  when to take this   Used for:  Chronic pain        Dose:  7.5-15 mg   Take 0.5-1 tablets (7.5-15 mg) by mouth 4 times daily as needed   Quantity:  1 tablet   Refills:  0       * Notice:  This list has 2 medication(s) that are the same as other medications prescribed for you. Read the directions carefully, and ask your doctor or other care provider to review them with you.      Stop taking these medicines if you haven't already. Please contact your care team if you have questions.     omeprazole 20 MG CR capsule   Commonly known as:  priLOSEC   Stopped by:  Dieter Gayle MD                    Primary Care Provider Office Phone # Fax #    Janie Melanie Farr [phone]       [facility] [address]  [facility] [address]        Equal Access to Services     [facility]: Hadii yanick dsouza Soanuj, waaxda luqadaha, qaybta kaalmada mateoda, riddhi sims. So [facility] [phone].    ATENCIÓN: Si habla español, tiene a cotton disposición servicios gratuitos de asistencia  lingüísticaJohn Downing al [phone].    We comply with applicable federal civil rights laws and [place] laws. We do not discriminate on the basis of race, color, national origin, age, disability, sex, sexual orientation, or gender identity.            Thank you!     Thank you for choosing PSYCHIATRY CLINIC  for your care. Our goal is always to provide you with excellent care. Hearing back from our patients is one way we can continue to improve our services. Please take a few minutes to complete the written survey that you may receive in the mail after your visit with us. Thank you!             Your Updated Medication List - Protect others around you: Learn how to safely use, store and throw away your medicines at www.disposemymeds.org.          This list is accurate as of 4/23/18 11:59 PM.  Always use your most recent med list.                   Brand Name Dispense Instructions for use Diagnosis    ACIDOPHILUS PO      1 tablet daily        CHLORTHALIDONE PO      Take 12.5 mg by mouth daily        fish oil-omega-3 fatty acids 1000 MG capsule      Take 2 g by mouth daily.        LISINOPRIL PO      Take 5 mg by mouth daily        medical cannabis (Patient's own supply.  Not a prescription)      1 capsule (This is NOT a prescription, and does not certify that the patient has a qualifying medical condition for medical cannabis.  The purpose of this order is  to document that the patient reports taking medical cannabis.)        metoprolol tartrate 25 MG tablet    LOPRESSOR    60 tablet    Take 1 tablet (25 mg) by mouth 2 times daily    Hypertension, unspecified type       * morphine 15 MG 12 hr tablet    MS CONTIN     Take 15 mg by mouth 3 times daily as needed        * morphine 15 MG IR tablet    MSIR    1 tablet    Take 0.5-1 tablets (7.5-15 mg) by mouth 4 times daily as needed    Chronic pain       other medical supplies      [employer]        sertraline 100 MG tablet    ZOLOFT    135 tablet    Take 1.5 tablets  (150 mg) by mouth daily    Mild episode of recurrent major depressive disorder (H)       traZODone 50 MG tablet    DESYREL    270 tablet    Take 3 tablets (150 mg) by mouth nightly as needed for sleep    Insomnia, unspecified type       VITAMIN D (CHOLECALCIFEROL) PO      Take 10,000 Units by mouth daily        * Notice:  This list has 2 medication(s) that are the same as other medications prescribed for you. Read the directions carefully, and ask your doctor or other care provider to review them with you.

## 2018-04-23 NOTE — NURSING NOTE
Chief Complaint   Patient presents with     Recheck Medication     Mild episode of recurrent major depressive disorder

## 2018-04-24 ASSESSMENT — PATIENT HEALTH QUESTIONNAIRE - PHQ9: SUM OF ALL RESPONSES TO PHQ QUESTIONS 1-9: 10

## 2018-05-10 ENCOUNTER — TELEPHONE (OUTPATIENT)
Dept: PSYCHIATRY | Facility: CLINIC | Age: 58
End: 2018-05-10

## 2018-05-10 NOTE — TELEPHONE ENCOUNTER
On 5/9/2018, a Permission to Verbally Discuss Protected Health Information form was received. Writer put the original in Dr. Gayle's folder, held a copy in Psych and routed a note to Dr. Gayle and Lilian Noel, MALKACC.

## 2018-05-14 ENCOUNTER — HOSPITAL ENCOUNTER (OUTPATIENT)
Dept: PHARMACY | Facility: OTHER | Age: 58
Discharge: HOME | End: 2018-05-14
Attending: PHARMACIST

## 2018-05-14 DIAGNOSIS — Z13.79 ENCOUNTER FOR PHARMACOGENETIC TESTING: ICD-10-CM

## 2018-05-14 DIAGNOSIS — G89.4 CHRONIC PAIN SYNDROME: ICD-10-CM

## 2018-05-14 DIAGNOSIS — Z78.9 TAKES DIETARY SUPPLEMENTS: ICD-10-CM

## 2018-05-14 ASSESSMENT — MIFFLIN-ST. JEOR: SCORE: 1425.44

## 2018-05-16 ENCOUNTER — COMMUNICATION - HEALTHEAST (OUTPATIENT)
Dept: PHARMACY | Facility: CLINIC | Age: 58
End: 2018-05-16

## 2018-05-18 ENCOUNTER — COMMUNICATION - HEALTHEAST (OUTPATIENT)
Dept: PALLIATIVE MEDICINE | Facility: OTHER | Age: 58
End: 2018-05-18

## 2018-05-22 ENCOUNTER — COMMUNICATION - HEALTHEAST (OUTPATIENT)
Dept: PALLIATIVE MEDICINE | Facility: OTHER | Age: 58
End: 2018-05-22

## 2018-05-22 DIAGNOSIS — G89.4 CHRONIC PAIN SYNDROME: ICD-10-CM

## 2018-06-07 ENCOUNTER — HOSPITAL ENCOUNTER (OUTPATIENT)
Dept: PALLIATIVE MEDICINE | Facility: OTHER | Age: 58
Discharge: HOME | End: 2018-06-07
Attending: PHYSICIAN ASSISTANT

## 2018-06-07 DIAGNOSIS — F11.20 OPIOID TYPE DEPENDENCE, CONTINUOUS (H): ICD-10-CM

## 2018-06-07 DIAGNOSIS — M25.50 PAIN IN JOINT, MULTIPLE SITES: ICD-10-CM

## 2018-06-07 DIAGNOSIS — G89.4 CHRONIC PAIN SYNDROME: ICD-10-CM

## 2018-06-07 DIAGNOSIS — G03.9 ARACHNOIDITIS: ICD-10-CM

## 2018-06-07 ASSESSMENT — MIFFLIN-ST. JEOR: SCORE: 1470.8

## 2018-06-11 ENCOUNTER — COMMUNICATION - HEALTHEAST (OUTPATIENT)
Dept: PALLIATIVE MEDICINE | Facility: OTHER | Age: 58
End: 2018-06-11

## 2018-06-22 ENCOUNTER — OFFICE VISIT (OUTPATIENT)
Dept: PSYCHIATRY | Facility: CLINIC | Age: 58
End: 2018-06-22
Attending: PSYCHIATRY & NEUROLOGY
Payer: MEDICARE

## 2018-06-22 ENCOUNTER — ALLIED HEALTH/NURSE VISIT (OUTPATIENT)
Dept: PSYCHIATRY | Facility: CLINIC | Age: 58
End: 2018-06-22
Payer: MEDICARE

## 2018-06-22 VITALS — DIASTOLIC BLOOD PRESSURE: 81 MMHG | WEIGHT: 202.2 LBS | BODY MASS INDEX: 32.64 KG/M2 | SYSTOLIC BLOOD PRESSURE: 143 MMHG

## 2018-06-22 DIAGNOSIS — Z71.89 COUNSELING AND COORDINATION OF CARE: Primary | ICD-10-CM

## 2018-06-22 DIAGNOSIS — F33.0 MILD EPISODE OF RECURRENT MAJOR DEPRESSIVE DISORDER (H): Primary | ICD-10-CM

## 2018-06-22 PROCEDURE — G0463 HOSPITAL OUTPT CLINIC VISIT: HCPCS | Mod: ZF

## 2018-06-22 RX ORDER — SERTRALINE HYDROCHLORIDE 25 MG/1
TABLET, FILM COATED ORAL
Qty: 90 TABLET | Refills: 2 | Status: SHIPPED | OUTPATIENT
Start: 2018-06-22 | End: 2021-08-31

## 2018-06-22 RX ORDER — SERTRALINE HYDROCHLORIDE 100 MG/1
TABLET, FILM COATED ORAL
Qty: 90 TABLET | Refills: 2 | Status: SHIPPED | OUTPATIENT
Start: 2018-06-22 | End: 2021-08-31

## 2018-06-22 ASSESSMENT — PAIN SCALES - GENERAL: PAINLEVEL: SEVERE PAIN (7)

## 2018-06-22 NOTE — MR AVS SNAPSHOT
After Visit Summary   6/22/2018    Keshia Arellano    MRN: [id number]           Patient Information     Date Of Birth          1960        Visit Information        Provider Department      6/22/2018 3:00 PM Annette Gamboa, [facility]        Today's Diagnoses     Counseling and coordination of care    -  1       Follow-ups after your visit        Who to contact     Please call your clinic at [phone] to:    Ask questions about your health    Make or cancel appointments    Discuss your medicines    Learn about your test results    Speak to your doctor            Additional Information About Your Visit        MyChart Information     [employer] gives you secure access to your electronic health record. If you see a primary care provider, you can also send messages to your care team and make appointments. If you have questions, please call your primary care clinic.  If you do not have a primary care provider, please call [phone] and they will assist you.      [employer] is an electronic gateway that provides easy, online access to your medical records. With [employer], you can request a clinic appointment, read your test results, renew a prescription or communicate with your care team.     To access your existing account, please contact your [facility] or call [phone] for assistance.        Care EveryWhere ID     This is your Care EveryWhere ID. This could be used by other organizations to access your [place] medical records  [id number]         Blood Pressure from Last 3 Encounters:   06/22/18 143/81   04/23/18 148/81   03/12/18 128/84    Weight from Last 3 Encounters:   06/22/18 91.7 kg (202 lb 3.2 oz)   04/23/18 88.5 kg (195 lb 3.2 oz)   03/12/18 86.3 kg (190 lb 3.2 oz)              Today, you had the following     No orders found for display         Today's Medication Changes          These changes are accurate as of 6/22/18 11:59  PM.  If you have any questions, ask your nurse or doctor.               These medicines have changed or have updated prescriptions.        Dose/Directions    * morphine 15 MG 12 hr tablet   Commonly known as:  MS CONTIN   This may have changed:  Another medication with the same name was changed. Make sure you understand how and when to take each.        Dose:  15 mg   Take 15 mg by mouth 3 times daily as needed   Refills:  0       * morphine 15 MG IR tablet   Commonly known as:  MSIR   This may have changed:  when to take this   Used for:  Chronic pain        Dose:  7.5-15 mg   Take 0.5-1 tablets (7.5-15 mg) by mouth 4 times daily as needed   Quantity:  1 tablet   Refills:  0       * sertraline 25 MG tablet   Commonly known as:  ZOLOFT   This may have changed:  You were already taking a medication with the same name, and this prescription was added. Make sure you understand how and when to take each.   Used for:  Mild episode of recurrent major depressive disorder (H)   Changed by:  Dieter Gayle MD        Take 25 mg plus 100 mg for a total of 125 mg daily.   Quantity:  90 tablet   Refills:  2       * sertraline 100 MG tablet   Commonly known as:  ZOLOFT   Indication:  Major Depressive Disorder   This may have changed:    - how much to take  - how to take this  - when to take this  - additional instructions   Used for:  Mild episode of recurrent major depressive disorder (H)   Changed by:  Dieter Gayle MD        Take 100 mg plus 25 mg daily for a total of 125 mg daily   Quantity:  90 tablet   Refills:  2       * Notice:  This list has 4 medication(s) that are the same as other medications prescribed for you. Read the directions carefully, and ask your doctor or other care provider to review them with you.         Where to get your medicines      These medications were sent to [facility] - [place], [place] - [address] [facility] [address]  [address] [facility] [address], [place] [address]      Phone:  [phone]     sertraline 100 MG tablet    sertraline 25 MG tablet                Primary Care Provider Office Phone # Fax #    Janie Melanie Farr [phone]       [facility] [address]  [facility] [address]        Equal Access to Services     [facility]: Hadii aad ku hadasho Soomaali, waaxda luqadaha, qaybta kaalmada adeegyada, waxay medinain haywaleskan adekoffi eden carlo sims. So [facility] [phone].    ATENCIÓN: Si habla español, tiene a cotton disposición servicios gratuitos de asistencia lingüística. Chang al [phone].    We comply with applicable federal civil rights laws and [place] laws. We do not discriminate on the basis of race, color, national origin, age, disability, sex, sexual orientation, or gender identity.            Thank you!     Thank you for choosing PSYCHIATRY CLINIC  for your care. Our goal is always to provide you with excellent care. Hearing back from our patients is one way we can continue to improve our services. Please take a few minutes to complete the written survey that you may receive in the mail after your visit with us. Thank you!             Your Updated Medication List - Protect others around you: Learn how to safely use, store and throw away your medicines at www.disposemymeds.org.          This list is accurate as of 6/22/18 11:59 PM.  Always use your most recent med list.                   Brand Name Dispense Instructions for use Diagnosis    ACIDOPHILUS PO      1 tablet daily        CHLORTHALIDONE PO      Take 12.5 mg by mouth daily        fish oil-omega-3 fatty acids 1000 MG capsule      Take 2 g by mouth daily.        LISINOPRIL PO      Take 5 mg by mouth daily        medical cannabis (Patient's own supply.  Not a prescription)      1 capsule (This is NOT a prescription, and does not certify that the patient has a qualifying medical condition for medical cannabis.  The purpose of this order is  to document that the patient  reports taking medical cannabis.)        metoprolol tartrate 25 MG tablet    LOPRESSOR    60 tablet    Take 1 tablet (25 mg) by mouth 2 times daily    Hypertension, unspecified type       * morphine 15 MG 12 hr tablet    MS CONTIN     Take 15 mg by mouth 3 times daily as needed        * morphine 15 MG IR tablet    MSIR    1 tablet    Take 0.5-1 tablets (7.5-15 mg) by mouth 4 times daily as needed    Chronic pain       other medical supplies      [employer] Therapy        * sertraline 25 MG tablet    ZOLOFT    90 tablet    Take 25 mg plus 100 mg for a total of 125 mg daily.    Mild episode of recurrent major depressive disorder (H)       * sertraline 100 MG tablet    ZOLOFT    90 tablet    Take 100 mg plus 25 mg daily for a total of 125 mg daily    Mild episode of recurrent major depressive disorder (H)       traZODone 50 MG tablet    DESYREL    270 tablet    Take 3 tablets (150 mg) by mouth nightly as needed for sleep    Insomnia, unspecified type       VITAMIN D (CHOLECALCIFEROL) PO      Take 10,000 Units by mouth daily        * Notice:  This list has 4 medication(s) that are the same as other medications prescribed for you. Read the directions carefully, and ask your doctor or other care provider to review them with you.
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
## 2018-06-22 NOTE — MR AVS SNAPSHOT
After Visit Summary   6/22/2018    Keshia Arellano    MRN: [id number]           Patient Information     Date Of Birth          1960        Visit Information        Provider Department      6/22/2018 1:45 PM Dieter Gayle MD Psychiatry Clinic        Today's Diagnoses     Mild episode of recurrent major depressive disorder (H)    -  1       Follow-ups after your visit        Who to contact     Please call your clinic at [phone] to:    Ask questions about your health    Make or cancel appointments    Discuss your medicines    Learn about your test results    Speak to your doctor            Additional Information About Your Visit        [facility] Information     [facility] gives you secure access to your electronic health record. If you see a primary care provider, you can also send messages to your care team and make appointments. If you have questions, please call your primary care clinic.  If you do not have a primary care provider, please call [phone] and they will assist you.      [facility] is an electronic gateway that provides easy, online access to your medical records. With [facility], you can request a clinic appointment, read your test results, renew a prescription or communicate with your care team.     To access your existing account, please contact your [facility] or call [phone] for assistance.        Care EveryWhere ID     This is your Care EveryWhere ID. This could be used by other organizations to access your [facility] medical records  [id number]        Your Vitals Were     BMI (Body Mass Index)                   32.64 kg/m2            Blood Pressure from Last 3 Encounters:   06/22/18 143/81   04/23/18 148/81   03/12/18 128/84    Weight from Last 3 Encounters:   06/22/18 91.7 kg (202 lb 3.2 oz)   04/23/18 88.5 kg (195 lb 3.2 oz)   03/12/18 86.3 kg (190 lb 3.2 oz)              Today, you had the following     No orders found for display          Today's Medication Changes          These changes are accurate as of 6/22/18 11:59 PM.  If you have any questions, ask your nurse or doctor.               These medicines have changed or have updated prescriptions.        Dose/Directions    * morphine 15 MG 12 hr tablet   Commonly known as:  MS CONTIN   This may have changed:  Another medication with the same name was changed. Make sure you understand how and when to take each.        Dose:  15 mg   Take 15 mg by mouth 3 times daily as needed   Refills:  0       * morphine 15 MG IR tablet   Commonly known as:  MSIR   This may have changed:  when to take this   Used for:  Chronic pain        Dose:  7.5-15 mg   Take 0.5-1 tablets (7.5-15 mg) by mouth 4 times daily as needed   Quantity:  1 tablet   Refills:  0       * sertraline 25 MG tablet   Commonly known as:  ZOLOFT   This may have changed:  You were already taking a medication with the same name, and this prescription was added. Make sure you understand how and when to take each.   Used for:  Mild episode of recurrent major depressive disorder (H)   Changed by:  Dieter Gayle MD        Take 25 mg plus 100 mg for a total of 125 mg daily.   Quantity:  90 tablet   Refills:  2       * sertraline 100 MG tablet   Commonly known as:  ZOLOFT   Indication:  Major Depressive Disorder   This may have changed:    - how much to take  - how to take this  - when to take this  - additional instructions   Used for:  Mild episode of recurrent major depressive disorder (H)   Changed by:  Dieter Gayle MD        Take 100 mg plus 25 mg daily for a total of 125 mg daily   Quantity:  90 tablet   Refills:  2       * Notice:  This list has 4 medication(s) that are the same as other medications prescribed for you. Read the directions carefully, and ask your doctor or other care provider to review them with you.         Where to get your medicines      These medications were sent to [facility] -  [place], [place] - [address]  [address], [place] [address]     Phone:  [phone]     sertraline 100 MG tablet    sertraline 25 MG tablet                Primary Care Provider Office Phone # Fax #    Janie Melanie Farr [phone]       [facility] [address]  [facility] [address]        Equal Access to Services     : Hadii aad ku hadasho Soomaali, waaxda luqadaha, qaybta kaalmada adeegyada, waxay idiin hayaan adeeg kharash la'aan . So [facility] [phone].    ATENCIÓN: Si habla español, tiene a cotton disposición servicios gratuitos de asistencia lingüística. [facility] [phone].    We comply with applicable federal civil rights laws and [place] laws. We do not discriminate on the basis of race, color, national origin, age, disability, sex, sexual orientation, or gender identity.            Thank you!     Thank you for choosing PSYCHIATRY CLINIC  for your care. Our goal is always to provide you with excellent care. Hearing back from our patients is one way we can continue to improve our services. Please take a few minutes to complete the written survey that you may receive in the mail after your visit with us. Thank you!             Your Updated Medication List - Protect others around you: Learn how to safely use, store and throw away your medicines at www.disposemymeds.org.          This list is accurate as of 6/22/18 11:59 PM.  Always use your most recent med list.                   Brand Name Dispense Instructions for use Diagnosis    ACIDOPHILUS PO      1 tablet daily        CHLORTHALIDONE PO      Take 12.5 mg by mouth daily        fish oil-omega-3 fatty acids 1000 MG capsule      Take 2 g by mouth daily.        LISINOPRIL PO      Take 5 mg by mouth daily        medical cannabis (Patient's own supply.  Not a prescription)      1 capsule (This is NOT a prescription, and does not certify that the patient has a qualifying medical  condition for medical cannabis.  The purpose of this order is  to document that the patient reports taking medical cannabis.)        metoprolol tartrate 25 MG tablet    LOPRESSOR    60 tablet    Take 1 tablet (25 mg) by mouth 2 times daily    Hypertension, unspecified type       * morphine 15 MG 12 hr tablet    MS CONTIN     Take 15 mg by mouth 3 times daily as needed        * morphine 15 MG IR tablet    MSIR    1 tablet    Take 0.5-1 tablets (7.5-15 mg) by mouth 4 times daily as needed    Chronic pain       other medical supplies      Blu[employer] Therapy        * sertraline 25 MG tablet    ZOLOFT    90 tablet    Take 25 mg plus 100 mg for a total of 125 mg daily.    Mild episode of recurrent major depressive disorder (H)       * sertraline 100 MG tablet    ZOLOFT    90 tablet    Take 100 mg plus 25 mg daily for a total of 125 mg daily    Mild episode of recurrent major depressive disorder (H)       traZODone 50 MG tablet    DESYREL    270 tablet    Take 3 tablets (150 mg) by mouth nightly as needed for sleep    Insomnia, unspecified type       VITAMIN D (CHOLECALCIFEROL) PO      Take 10,000 Units by mouth daily        * Notice:  This list has 4 medication(s) that are the same as other medications prescribed for you. Read the directions carefully, and ask your doctor or other care provider to review them with you.

## 2018-06-22 NOTE — NURSING NOTE
Chief Complaint   Patient presents with     Recheck Medication     Mild episode of recurrent major depressive disorder      Omitted pulse

## 2018-06-23 ASSESSMENT — PATIENT HEALTH QUESTIONNAIRE - PHQ9: SUM OF ALL RESPONSES TO PHQ QUESTIONS 1-9: 11

## 2018-06-25 NOTE — PROGRESS NOTES
Social Work Consultation  [facility]      Patient Name:  Kesiha Arellano  /Age:  1960 (57 year old)    Presenting SW Need(s)/ Reason for visit:  Patient wants referrals to psychiatric services outside of [facility].    Collaborated With:    -Keshia Arellano  -Dr. Gaspar    Intervention:  Met with patient following their psychiatry appt.      -Briefly assessed needs. This was patient's last visit with Dr. Gayle prior to resident rotation change. Patient reported to that she would like care elsewhere due to belief that she was prescribed Zoloft at night.  -SW reviewed patient's insurance information and her preferences. Patient would like a psychiatrist close to her home in [place] and one that comes with high recommendations. SW reviewed possibilities through google search of nearby clinics and search of psychiatrists covered under patient insurance plan. SW assisted patient in calling two offices to try to obtain an appointment.    Resources Provided:  -[facility] [address], [phone]  -[facility] [address], [phone]  -[facility] [address], [phone]  -[facility] [address], [phone]  -[facility] [address], [phone]    Social Work Assessment:  Patient expressed a desire to find a new psychiatrist. She is able to express her wants and needs regarding a new psychiatrist and appears able to make follow up phone calls.    Plan:  No further follow up needed.  Provided patient with writer's contact information and availability.      Will route to patient's psychiatric provider(s) as an FYI.    Annette Gamboa, [facility]    This is a non-billable encounter as it was solely for the purposes of outreach and/or care coordination.

## 2018-07-19 ENCOUNTER — COMMUNICATION - HEALTHEAST (OUTPATIENT)
Dept: PALLIATIVE MEDICINE | Facility: OTHER | Age: 58
End: 2018-07-19

## 2018-07-19 DIAGNOSIS — G89.4 CHRONIC PAIN SYNDROME: ICD-10-CM

## 2018-08-07 ENCOUNTER — HOSPITAL ENCOUNTER (OUTPATIENT)
Dept: PALLIATIVE MEDICINE | Facility: OTHER | Age: 58
Discharge: HOME | End: 2018-08-07
Attending: PHYSICIAN ASSISTANT

## 2018-08-07 ENCOUNTER — RECORDS - HEALTHEAST (OUTPATIENT)
Dept: ADMINISTRATIVE | Facility: OTHER | Age: 58
End: 2018-08-07

## 2018-08-07 DIAGNOSIS — F11.20 OPIOID TYPE DEPENDENCE, CONTINUOUS (H): ICD-10-CM

## 2018-08-07 DIAGNOSIS — Z79.899 MEDICAL CANNABIS USE: ICD-10-CM

## 2018-08-07 DIAGNOSIS — G89.4 CHRONIC PAIN SYNDROME: ICD-10-CM

## 2018-08-07 DIAGNOSIS — E55.9 VITAMIN D INSUFFICIENCY: ICD-10-CM

## 2018-08-07 DIAGNOSIS — G03.9 ARACHNOIDITIS: ICD-10-CM

## 2018-08-07 ASSESSMENT — MIFFLIN-ST. JEOR: SCORE: 1470.8

## 2018-08-08 ENCOUNTER — COMMUNICATION - HEALTHEAST (OUTPATIENT)
Dept: PALLIATIVE MEDICINE | Facility: OTHER | Age: 58
End: 2018-08-08

## 2018-08-08 DIAGNOSIS — G89.4 CHRONIC PAIN SYNDROME: ICD-10-CM

## 2018-08-10 ENCOUNTER — HOSPITAL ENCOUNTER (OUTPATIENT)
Dept: LAB | Age: 58
Setting detail: SPECIMEN
Discharge: HOME | End: 2018-08-10

## 2018-08-10 LAB
ESTRADIOL SERPL-MCNC: <10 PG/ML
PROGEST SERPL-MCNC: 0.1 NG/ML

## 2018-08-13 LAB — 25(OH)D3 SERPL-MCNC: 49.7 NG/ML (ref 30–80)

## 2018-08-14 ENCOUNTER — COMMUNICATION - HEALTHEAST (OUTPATIENT)
Dept: PALLIATIVE MEDICINE | Facility: CLINIC | Age: 58
End: 2018-08-14

## 2018-08-14 LAB
SHBG SERPL-SCNC: 99 NMOL/L (ref 30–135)
TESTOST FREE SERPL-MCNC: 0.11 NG/DL (ref 0.06–0.38)
TESTOST SERPL-MCNC: 16 NG/DL (ref 8–60)

## 2018-08-15 LAB — DHEA SERPL-MCNC: 1.97 NG/ML (ref 0.63–4.7)

## 2018-09-05 ENCOUNTER — COMMUNICATION - HEALTHEAST (OUTPATIENT)
Dept: PALLIATIVE MEDICINE | Facility: OTHER | Age: 58
End: 2018-09-05

## 2018-09-05 DIAGNOSIS — G89.4 CHRONIC PAIN SYNDROME: ICD-10-CM

## 2018-09-13 ENCOUNTER — COMMUNICATION - HEALTHEAST (OUTPATIENT)
Dept: PALLIATIVE MEDICINE | Facility: CLINIC | Age: 58
End: 2018-09-13

## 2018-09-13 DIAGNOSIS — G89.4 CHRONIC PAIN SYNDROME: ICD-10-CM

## 2018-09-19 ENCOUNTER — COMMUNICATION - HEALTHEAST (OUTPATIENT)
Dept: PALLIATIVE MEDICINE | Facility: OTHER | Age: 58
End: 2018-09-19

## 2018-09-19 DIAGNOSIS — G89.4 CHRONIC PAIN SYNDROME: ICD-10-CM

## 2018-10-04 ENCOUNTER — HOSPITAL ENCOUNTER (OUTPATIENT)
Dept: PALLIATIVE MEDICINE | Facility: OTHER | Age: 58
Discharge: HOME | End: 2018-10-04
Attending: PHYSICIAN ASSISTANT

## 2018-10-04 ENCOUNTER — RECORDS - HEALTHEAST (OUTPATIENT)
Dept: ADMINISTRATIVE | Facility: OTHER | Age: 58
End: 2018-10-04

## 2018-10-04 DIAGNOSIS — G03.9 ARACHNOIDITIS: ICD-10-CM

## 2018-10-04 DIAGNOSIS — M50.30 DEGENERATION OF CERVICAL INTERVERTEBRAL DISC: ICD-10-CM

## 2018-10-04 DIAGNOSIS — M25.50 PAIN IN JOINT, MULTIPLE SITES: ICD-10-CM

## 2018-10-04 DIAGNOSIS — F11.90 CHRONIC, CONTINUOUS USE OF OPIOIDS: ICD-10-CM

## 2018-10-04 DIAGNOSIS — G89.4 CHRONIC PAIN SYNDROME: ICD-10-CM

## 2018-10-04 ASSESSMENT — MIFFLIN-ST. JEOR: SCORE: 1470.8

## 2018-10-09 ENCOUNTER — COMMUNICATION - HEALTHEAST (OUTPATIENT)
Dept: PALLIATIVE MEDICINE | Facility: OTHER | Age: 58
End: 2018-10-09

## 2018-10-11 ENCOUNTER — AMBULATORY - HEALTHEAST (OUTPATIENT)
Dept: PALLIATIVE MEDICINE | Facility: OTHER | Age: 58
End: 2018-10-11

## 2018-10-11 DIAGNOSIS — M62.830 BACK MUSCLE SPASM: ICD-10-CM

## 2018-10-17 ENCOUNTER — AMBULATORY - HEALTHEAST (OUTPATIENT)
Dept: NURSING | Facility: CLINIC | Age: 58
End: 2018-10-17

## 2018-10-17 LAB — MAGNESIUM SERPL-MCNC: 2.2 MG/DL (ref 1.8–2.6)

## 2018-12-04 ENCOUNTER — HOSPITAL ENCOUNTER (OUTPATIENT)
Dept: PALLIATIVE MEDICINE | Facility: OTHER | Age: 58
Discharge: HOME | End: 2018-12-04
Attending: PHYSICIAN ASSISTANT

## 2018-12-04 DIAGNOSIS — Z79.899 MEDICAL CANNABIS USE: ICD-10-CM

## 2018-12-04 DIAGNOSIS — F11.90 CHRONIC, CONTINUOUS USE OF OPIOIDS: ICD-10-CM

## 2018-12-04 DIAGNOSIS — G03.9 ARACHNOIDITIS: ICD-10-CM

## 2018-12-04 DIAGNOSIS — G89.4 CHRONIC PAIN SYNDROME: ICD-10-CM

## 2018-12-04 ASSESSMENT — MIFFLIN-ST. JEOR: SCORE: 1470.8

## 2019-01-04 ENCOUNTER — COMMUNICATION - HEALTHEAST (OUTPATIENT)
Dept: PALLIATIVE MEDICINE | Facility: OTHER | Age: 59
End: 2019-01-04

## 2019-01-04 DIAGNOSIS — G89.4 CHRONIC PAIN SYNDROME: ICD-10-CM

## 2019-01-07 ENCOUNTER — COMMUNICATION - HEALTHEAST (OUTPATIENT)
Dept: PALLIATIVE MEDICINE | Facility: OTHER | Age: 59
End: 2019-01-07

## 2019-01-31 ENCOUNTER — HOSPITAL ENCOUNTER (OUTPATIENT)
Dept: PALLIATIVE MEDICINE | Facility: OTHER | Age: 59
Discharge: HOME | End: 2019-01-31
Attending: PHYSICIAN ASSISTANT

## 2019-01-31 DIAGNOSIS — F11.90 CHRONIC, CONTINUOUS USE OF OPIOIDS: ICD-10-CM

## 2019-01-31 DIAGNOSIS — G03.9 ARACHNOIDITIS: ICD-10-CM

## 2019-01-31 DIAGNOSIS — M54.2 CERVICALGIA: ICD-10-CM

## 2019-01-31 DIAGNOSIS — G89.4 CHRONIC PAIN SYNDROME: ICD-10-CM

## 2019-01-31 DIAGNOSIS — Z79.899 MEDICAL CANNABIS USE: ICD-10-CM

## 2019-01-31 ASSESSMENT — MIFFLIN-ST. JEOR: SCORE: 1470.8

## 2019-02-07 ENCOUNTER — COMMUNICATION - HEALTHEAST (OUTPATIENT)
Dept: PALLIATIVE MEDICINE | Facility: OTHER | Age: 59
End: 2019-02-07

## 2019-02-26 ENCOUNTER — COMMUNICATION - HEALTHEAST (OUTPATIENT)
Dept: PALLIATIVE MEDICINE | Facility: OTHER | Age: 59
End: 2019-02-26

## 2019-03-28 ENCOUNTER — COMMUNICATION - HEALTHEAST (OUTPATIENT)
Dept: PALLIATIVE MEDICINE | Facility: OTHER | Age: 59
End: 2019-03-28

## 2019-03-28 DIAGNOSIS — G89.4 CHRONIC PAIN SYNDROME: ICD-10-CM

## 2019-04-09 ENCOUNTER — HOSPITAL ENCOUNTER (OUTPATIENT)
Dept: PALLIATIVE MEDICINE | Facility: OTHER | Age: 59
Discharge: HOME | End: 2019-04-09
Attending: PHYSICIAN ASSISTANT

## 2019-04-09 DIAGNOSIS — G89.4 CHRONIC PAIN SYNDROME: ICD-10-CM

## 2019-04-09 DIAGNOSIS — M25.50 PAIN IN JOINT, MULTIPLE SITES: ICD-10-CM

## 2019-04-09 DIAGNOSIS — F11.90 CHRONIC, CONTINUOUS USE OF OPIOIDS: ICD-10-CM

## 2019-04-09 DIAGNOSIS — G03.9 ARACHNOIDITIS: ICD-10-CM

## 2019-04-09 ASSESSMENT — MIFFLIN-ST. JEOR: SCORE: 1470.8

## 2019-05-02 ENCOUNTER — HOSPITAL ENCOUNTER (OUTPATIENT)
Dept: PHARMACY | Facility: OTHER | Age: 59
Discharge: HOME | End: 2019-05-02
Attending: PHYSICIAN ASSISTANT

## 2019-05-02 DIAGNOSIS — F32.A DEPRESSION, UNSPECIFIED DEPRESSION TYPE: ICD-10-CM

## 2019-05-02 DIAGNOSIS — Z79.899 MEDICATION MANAGEMENT: ICD-10-CM

## 2019-05-02 ASSESSMENT — MIFFLIN-ST. JEOR: SCORE: 1470.8

## 2019-06-04 ENCOUNTER — HOSPITAL ENCOUNTER (OUTPATIENT)
Dept: PALLIATIVE MEDICINE | Facility: OTHER | Age: 59
Discharge: HOME | End: 2019-06-04
Attending: PHYSICIAN ASSISTANT

## 2019-06-04 DIAGNOSIS — M25.50 PAIN IN JOINT, MULTIPLE SITES: ICD-10-CM

## 2019-06-04 DIAGNOSIS — G89.4 CHRONIC PAIN SYNDROME: ICD-10-CM

## 2019-06-04 DIAGNOSIS — F11.90 CHRONIC, CONTINUOUS USE OF OPIOIDS: ICD-10-CM

## 2019-06-04 DIAGNOSIS — G03.9 ARACHNOIDITIS: ICD-10-CM

## 2019-06-04 ASSESSMENT — MIFFLIN-ST. JEOR: SCORE: 1439.05

## 2019-07-09 ENCOUNTER — COMMUNICATION - HEALTHEAST (OUTPATIENT)
Dept: PALLIATIVE MEDICINE | Facility: OTHER | Age: 59
End: 2019-07-09

## 2019-07-09 DIAGNOSIS — G89.4 CHRONIC PAIN SYNDROME: ICD-10-CM

## 2019-07-16 NOTE — TELEPHONE ENCOUNTER
Dolores Selby Michelle, RN        Phone Number: [phone]                     Dr. Harvinder Kiran from the [facility] clinic is calling to speak with Dr. Gayle regarding their mutual patient.  He would like a call back at [phone].     Thank you,   Dolores              Routed to Dr. Gayle   Would like to try the cream medication instead of surgery at this time. Please call patient back     Would also pain medication please

## 2019-07-29 ENCOUNTER — AMBULATORY - HEALTHEAST (OUTPATIENT)
Dept: PALLIATIVE MEDICINE | Facility: OTHER | Age: 59
End: 2019-07-29

## 2019-08-15 ENCOUNTER — COMMUNICATION - HEALTHEAST (OUTPATIENT)
Dept: PALLIATIVE MEDICINE | Facility: OTHER | Age: 59
End: 2019-08-15

## 2019-08-15 DIAGNOSIS — G89.4 CHRONIC PAIN SYNDROME: ICD-10-CM

## 2019-08-30 ENCOUNTER — HOSPITAL ENCOUNTER (OUTPATIENT)
Dept: PALLIATIVE MEDICINE | Facility: OTHER | Age: 59
Discharge: HOME | End: 2019-08-30
Attending: PHYSICIAN ASSISTANT

## 2019-08-30 DIAGNOSIS — G89.4 CHRONIC PAIN SYNDROME: ICD-10-CM

## 2019-08-30 DIAGNOSIS — Z79.899 MEDICAL CANNABIS USE: ICD-10-CM

## 2019-08-30 DIAGNOSIS — M25.50 PAIN IN JOINT, MULTIPLE SITES: ICD-10-CM

## 2019-08-30 DIAGNOSIS — F11.90 CHRONIC, CONTINUOUS USE OF OPIOIDS: ICD-10-CM

## 2019-08-30 DIAGNOSIS — G03.9 ARACHNOIDITIS: ICD-10-CM

## 2019-08-30 ASSESSMENT — MIFFLIN-ST. JEOR: SCORE: 1443.58

## 2019-10-02 ENCOUNTER — HEALTH MAINTENANCE LETTER (OUTPATIENT)
Age: 59
End: 2019-10-02

## 2019-10-14 ENCOUNTER — COMMUNICATION - HEALTHEAST (OUTPATIENT)
Dept: PALLIATIVE MEDICINE | Facility: OTHER | Age: 59
End: 2019-10-14

## 2019-10-14 DIAGNOSIS — G89.4 CHRONIC PAIN SYNDROME: ICD-10-CM

## 2019-10-29 ENCOUNTER — HOSPITAL ENCOUNTER (OUTPATIENT)
Dept: PALLIATIVE MEDICINE | Facility: OTHER | Age: 59
Discharge: HOME | End: 2019-10-29
Attending: PHYSICIAN ASSISTANT

## 2019-10-29 DIAGNOSIS — G89.4 CHRONIC PAIN SYNDROME: ICD-10-CM

## 2019-10-29 DIAGNOSIS — M50.30 DEGENERATION OF CERVICAL INTERVERTEBRAL DISC: ICD-10-CM

## 2019-10-29 DIAGNOSIS — F11.90 CHRONIC, CONTINUOUS USE OF OPIOIDS: ICD-10-CM

## 2019-10-29 DIAGNOSIS — G03.9 ARACHNOIDITIS: ICD-10-CM

## 2019-10-29 DIAGNOSIS — Z79.899 MEDICAL CANNABIS USE: ICD-10-CM

## 2019-10-29 ASSESSMENT — MIFFLIN-ST. JEOR: SCORE: 1443.58

## 2019-10-31 LAB
6MAM UR QL: NOT DETECTED
7AMINOCLONAZEPAM UR QL: NOT DETECTED
A-OH ALPRAZ UR QL: NOT DETECTED
ALPRAZ UR QL: NOT DETECTED
AMPHET UR QL SCN: NOT DETECTED
BARBITURATES UR QL: NOT DETECTED
BUPRENORPHINE UR QL: NOT DETECTED
BZE UR QL: NOT DETECTED
CARBOXYTHC UR QL: PRESENT
CARISOPRODOL UR QL: NOT DETECTED
CLONAZEPAM UR QL: NOT DETECTED
CODEINE UR QL: NOT DETECTED
CREAT UR-MCNC: 150.7 MG/DL (ref 20–400)
DIAZEPAM UR QL: NOT DETECTED
ETHYL GLUCURONIDE UR QL: NOT DETECTED
FENTANYL UR QL: NOT DETECTED
HYDROCODONE UR QL: NOT DETECTED
HYDROMORPHONE UR QL: PRESENT
LORAZEPAM UR QL: NOT DETECTED
MDA UR QL: NOT DETECTED
MDEA UR QL: NOT DETECTED
MDMA UR QL: NOT DETECTED
ME-PHENIDATE UR QL: NOT DETECTED
MEPERIDINE UR QL: NOT DETECTED
METHADONE UR QL: NOT DETECTED
METHAMPHET UR QL: NOT DETECTED
MIDAZOLAM UR QL SCN: NOT DETECTED
MORPHINE UR QL: PRESENT
NORBUPRENORPHINE UR QL CFM: NOT DETECTED
NORDIAZEPAM UR QL: NOT DETECTED
NORFENTANYL UR QL: NOT DETECTED
NORHYDROCODONE UR QL CFM: NOT DETECTED
NOROXYCODONE UR QL CFM: NOT DETECTED
NOROXYMORPHONE UR QL SCN: NOT DETECTED
OXAZEPAM UR QL: NOT DETECTED
OXYCODONE UR QL: NOT DETECTED
OXYMORPHONE UR QL: NOT DETECTED
PATHOLOGY STUDY: NORMAL
PCP UR QL: NOT DETECTED
PHENTERMINE UR QL: NOT DETECTED
PROPOXYPH UR QL: NOT DETECTED
SERVICE CMNT-IMP: NORMAL
TAPENTADOL UR QL SCN: NOT DETECTED
TAPENTADOL UR QL SCN: NOT DETECTED
TEMAZEPAM UR QL: NOT DETECTED
TRAMADOL UR QL: NOT DETECTED
ZOLPIDEM UR QL: NOT DETECTED

## 2019-12-12 ENCOUNTER — COMMUNICATION - HEALTHEAST (OUTPATIENT)
Dept: PALLIATIVE MEDICINE | Facility: OTHER | Age: 59
End: 2019-12-12

## 2019-12-12 DIAGNOSIS — G89.4 CHRONIC PAIN SYNDROME: ICD-10-CM

## 2019-12-15 ENCOUNTER — HEALTH MAINTENANCE LETTER (OUTPATIENT)
Age: 59
End: 2019-12-15

## 2019-12-26 ENCOUNTER — HOSPITAL ENCOUNTER (OUTPATIENT)
Dept: PALLIATIVE MEDICINE | Facility: OTHER | Age: 59
Discharge: HOME | End: 2019-12-26
Attending: PHYSICIAN ASSISTANT

## 2019-12-26 DIAGNOSIS — M50.30 DEGENERATION OF CERVICAL INTERVERTEBRAL DISC: ICD-10-CM

## 2019-12-26 DIAGNOSIS — G89.29 INSOMNIA SECONDARY TO CHRONIC PAIN: ICD-10-CM

## 2019-12-26 DIAGNOSIS — Z79.899 MEDICAL CANNABIS USE: ICD-10-CM

## 2019-12-26 DIAGNOSIS — F11.90 CHRONIC, CONTINUOUS USE OF OPIOIDS: ICD-10-CM

## 2019-12-26 DIAGNOSIS — G03.9 ARACHNOIDITIS: ICD-10-CM

## 2019-12-26 DIAGNOSIS — G89.4 CHRONIC PAIN SYNDROME: ICD-10-CM

## 2019-12-26 DIAGNOSIS — G47.01 INSOMNIA SECONDARY TO CHRONIC PAIN: ICD-10-CM

## 2019-12-26 ASSESSMENT — MIFFLIN-ST. JEOR: SCORE: 1443.58

## 2020-01-14 ENCOUNTER — COMMUNICATION - HEALTHEAST (OUTPATIENT)
Dept: PALLIATIVE MEDICINE | Facility: OTHER | Age: 60
End: 2020-01-14

## 2020-01-27 ENCOUNTER — COMMUNICATION - HEALTHEAST (OUTPATIENT)
Dept: PALLIATIVE MEDICINE | Facility: OTHER | Age: 60
End: 2020-01-27

## 2020-01-30 ENCOUNTER — COMMUNICATION - HEALTHEAST (OUTPATIENT)
Dept: PALLIATIVE MEDICINE | Facility: OTHER | Age: 60
End: 2020-01-30

## 2020-02-03 ENCOUNTER — OFFICE VISIT - HEALTHEAST (OUTPATIENT)
Dept: SLEEP MEDICINE | Facility: CLINIC | Age: 60
End: 2020-02-03

## 2020-02-03 DIAGNOSIS — G47.00 INSOMNIA, PERSISTENT: ICD-10-CM

## 2020-02-03 DIAGNOSIS — R53.82 CHRONIC FATIGUE: ICD-10-CM

## 2020-02-03 DIAGNOSIS — G47.8 NON-RESTORATIVE SLEEP: ICD-10-CM

## 2020-02-03 DIAGNOSIS — R06.83 SNORING: ICD-10-CM

## 2020-02-03 ASSESSMENT — MIFFLIN-ST. JEOR: SCORE: 1448.12

## 2020-02-10 ENCOUNTER — COMMUNICATION - HEALTHEAST (OUTPATIENT)
Dept: PALLIATIVE MEDICINE | Facility: OTHER | Age: 60
End: 2020-02-10

## 2020-02-10 DIAGNOSIS — G89.4 CHRONIC PAIN SYNDROME: ICD-10-CM

## 2020-02-25 ENCOUNTER — HOSPITAL ENCOUNTER (OUTPATIENT)
Dept: PALLIATIVE MEDICINE | Facility: OTHER | Age: 60
Discharge: HOME | End: 2020-02-25
Attending: PHYSICIAN ASSISTANT

## 2020-02-25 DIAGNOSIS — G03.9 ARACHNOIDITIS: ICD-10-CM

## 2020-02-25 DIAGNOSIS — G89.4 CHRONIC PAIN SYNDROME: ICD-10-CM

## 2020-02-25 DIAGNOSIS — G47.01 INSOMNIA SECONDARY TO CHRONIC PAIN: ICD-10-CM

## 2020-02-25 DIAGNOSIS — Z79.899 MEDICAL CANNABIS USE: ICD-10-CM

## 2020-02-25 DIAGNOSIS — G89.29 INSOMNIA SECONDARY TO CHRONIC PAIN: ICD-10-CM

## 2020-02-25 DIAGNOSIS — F11.90 CHRONIC, CONTINUOUS USE OF OPIOIDS: ICD-10-CM

## 2020-02-25 ASSESSMENT — MIFFLIN-ST. JEOR: SCORE: 1429.98

## 2020-02-26 ENCOUNTER — COMMUNICATION - HEALTHEAST (OUTPATIENT)
Dept: PALLIATIVE MEDICINE | Facility: OTHER | Age: 60
End: 2020-02-26

## 2020-03-23 ENCOUNTER — COMMUNICATION - HEALTHEAST (OUTPATIENT)
Dept: PALLIATIVE MEDICINE | Facility: OTHER | Age: 60
End: 2020-03-23

## 2020-03-23 DIAGNOSIS — G89.4 CHRONIC PAIN SYNDROME: ICD-10-CM

## 2020-04-21 ENCOUNTER — HOSPITAL ENCOUNTER (OUTPATIENT)
Dept: PALLIATIVE MEDICINE | Facility: OTHER | Age: 60
Discharge: HOME | End: 2020-04-21
Attending: PHYSICIAN ASSISTANT

## 2020-04-21 DIAGNOSIS — M46.1 SACROILIITIS, NOT ELSEWHERE CLASSIFIED (H): ICD-10-CM

## 2020-04-21 DIAGNOSIS — Z79.899 MEDICAL CANNABIS USE: ICD-10-CM

## 2020-04-21 DIAGNOSIS — G89.4 CHRONIC PAIN SYNDROME: ICD-10-CM

## 2020-04-21 DIAGNOSIS — F11.90 CHRONIC, CONTINUOUS USE OF OPIOIDS: ICD-10-CM

## 2020-05-18 ENCOUNTER — COMMUNICATION - HEALTHEAST (OUTPATIENT)
Dept: PALLIATIVE MEDICINE | Facility: OTHER | Age: 60
End: 2020-05-18

## 2020-05-18 DIAGNOSIS — G89.4 CHRONIC PAIN SYNDROME: ICD-10-CM

## 2020-05-19 ENCOUNTER — COMMUNICATION - HEALTHEAST (OUTPATIENT)
Dept: PALLIATIVE MEDICINE | Facility: OTHER | Age: 60
End: 2020-05-19

## 2020-05-19 DIAGNOSIS — G89.4 CHRONIC PAIN SYNDROME: ICD-10-CM

## 2020-05-20 ENCOUNTER — RECORDS - HEALTHEAST (OUTPATIENT)
Dept: ADMINISTRATIVE | Facility: OTHER | Age: 60
End: 2020-05-20

## 2020-05-26 ENCOUNTER — COMMUNICATION - HEALTHEAST (OUTPATIENT)
Dept: PALLIATIVE MEDICINE | Facility: OTHER | Age: 60
End: 2020-05-26

## 2020-05-26 DIAGNOSIS — G89.4 CHRONIC PAIN SYNDROME: ICD-10-CM

## 2020-06-16 ENCOUNTER — HOSPITAL ENCOUNTER (OUTPATIENT)
Dept: PALLIATIVE MEDICINE | Facility: OTHER | Age: 60
Discharge: HOME | End: 2020-06-16
Attending: PHYSICIAN ASSISTANT

## 2020-06-16 DIAGNOSIS — Z79.899 MEDICAL CANNABIS USE: ICD-10-CM

## 2020-06-16 DIAGNOSIS — G03.9 ARACHNOIDITIS: ICD-10-CM

## 2020-06-16 DIAGNOSIS — G89.4 CHRONIC PAIN SYNDROME: ICD-10-CM

## 2020-06-16 DIAGNOSIS — G47.00 INSOMNIA: ICD-10-CM

## 2020-06-16 DIAGNOSIS — F11.90 CHRONIC, CONTINUOUS USE OF OPIOIDS: ICD-10-CM

## 2020-07-13 ENCOUNTER — COMMUNICATION - HEALTHEAST (OUTPATIENT)
Dept: PALLIATIVE MEDICINE | Facility: OTHER | Age: 60
End: 2020-07-13

## 2020-07-13 DIAGNOSIS — G89.4 CHRONIC PAIN SYNDROME: ICD-10-CM

## 2020-08-11 ENCOUNTER — HOSPITAL ENCOUNTER (OUTPATIENT)
Dept: PALLIATIVE MEDICINE | Facility: OTHER | Age: 60
Discharge: HOME | End: 2020-08-11
Attending: PHYSICIAN ASSISTANT

## 2020-08-11 DIAGNOSIS — M50.30 DEGENERATION OF CERVICAL INTERVERTEBRAL DISC: ICD-10-CM

## 2020-08-11 DIAGNOSIS — Z79.899 MEDICAL CANNABIS USE: ICD-10-CM

## 2020-08-11 DIAGNOSIS — G89.4 CHRONIC PAIN SYNDROME: ICD-10-CM

## 2020-08-11 DIAGNOSIS — G03.9 ARACHNOIDITIS: ICD-10-CM

## 2020-08-11 DIAGNOSIS — F11.90 CHRONIC, CONTINUOUS USE OF OPIOIDS: ICD-10-CM

## 2020-09-11 ENCOUNTER — COMMUNICATION - HEALTHEAST (OUTPATIENT)
Dept: PALLIATIVE MEDICINE | Facility: OTHER | Age: 60
End: 2020-09-11

## 2020-09-11 DIAGNOSIS — G89.4 CHRONIC PAIN SYNDROME: ICD-10-CM

## 2020-09-15 ENCOUNTER — AMBULATORY - HEALTHEAST (OUTPATIENT)
Dept: PALLIATIVE MEDICINE | Facility: OTHER | Age: 60
End: 2020-09-15

## 2020-09-15 DIAGNOSIS — G89.4 CHRONIC PAIN SYNDROME: ICD-10-CM

## 2020-10-13 ENCOUNTER — HOSPITAL ENCOUNTER (OUTPATIENT)
Dept: PALLIATIVE MEDICINE | Facility: OTHER | Age: 60
Discharge: HOME | End: 2020-10-13
Attending: PHYSICIAN ASSISTANT

## 2020-10-13 DIAGNOSIS — F11.90 CHRONIC, CONTINUOUS USE OF OPIOIDS: ICD-10-CM

## 2020-10-13 DIAGNOSIS — G89.4 CHRONIC PAIN SYNDROME: ICD-10-CM

## 2020-10-13 DIAGNOSIS — M25.50 PAIN IN JOINT, MULTIPLE SITES: ICD-10-CM

## 2020-10-13 DIAGNOSIS — M51.379 DEGENERATION OF LUMBAR OR LUMBOSACRAL INTERVERTEBRAL DISC: ICD-10-CM

## 2020-10-13 DIAGNOSIS — Z79.899 MEDICAL CANNABIS USE: ICD-10-CM

## 2020-10-13 DIAGNOSIS — M50.30 DEGENERATION OF CERVICAL INTERVERTEBRAL DISC: ICD-10-CM

## 2020-10-16 ENCOUNTER — HOSPITAL ENCOUNTER (OUTPATIENT)
Dept: PALLIATIVE MEDICINE | Facility: OTHER | Age: 60
Discharge: HOME | End: 2020-10-16

## 2020-10-16 DIAGNOSIS — G89.4 CHRONIC PAIN SYNDROME: ICD-10-CM

## 2020-10-19 LAB
6MAM UR QL: NOT DETECTED
7AMINOCLONAZEPAM UR QL: NOT DETECTED
A-OH ALPRAZ UR QL: NOT DETECTED
ALPRAZ UR QL: NOT DETECTED
AMPHET UR QL SCN: NOT DETECTED
BARBITURATES UR QL: NOT DETECTED
BUPRENORPHINE UR QL: NOT DETECTED
BZE UR QL: NOT DETECTED
CARBOXYTHC UR QL: PRESENT
CARISOPRODOL UR QL: NOT DETECTED
CLONAZEPAM UR QL: NOT DETECTED
CODEINE UR QL: NOT DETECTED
CREAT UR-MCNC: 169.8 MG/DL (ref 20–400)
DIAZEPAM UR QL: NOT DETECTED
ETHYL GLUCURONIDE UR QL: NOT DETECTED
FENTANYL UR QL: NOT DETECTED
HYDROCODONE UR QL: NOT DETECTED
HYDROMORPHONE UR QL: PRESENT
LORAZEPAM UR QL: NOT DETECTED
MDA UR QL: NOT DETECTED
MDEA UR QL: NOT DETECTED
MDMA UR QL: NOT DETECTED
ME-PHENIDATE UR QL: NOT DETECTED
MEPERIDINE UR QL: NOT DETECTED
METHADONE UR QL: NOT DETECTED
METHAMPHET UR QL: NOT DETECTED
MIDAZOLAM UR QL SCN: NOT DETECTED
MORPHINE UR QL: PRESENT
NORBUPRENORPHINE UR QL CFM: NOT DETECTED
NORDIAZEPAM UR QL: NOT DETECTED
NORFENTANYL UR QL: NOT DETECTED
NORHYDROCODONE UR QL CFM: NOT DETECTED
NOROXYCODONE UR QL CFM: NOT DETECTED
NOROXYMORPHONE UR QL SCN: NOT DETECTED
OXAZEPAM UR QL: NOT DETECTED
OXYCODONE UR QL: NOT DETECTED
OXYMORPHONE UR QL: NOT DETECTED
PATHOLOGY STUDY: NORMAL
PCP UR QL: NOT DETECTED
PHENTERMINE UR QL: NOT DETECTED
PROPOXYPH UR QL: NOT DETECTED
SERVICE CMNT-IMP: NORMAL
TAPENTADOL UR QL SCN: NOT DETECTED
TAPENTADOL UR QL SCN: NOT DETECTED
TEMAZEPAM UR QL: NOT DETECTED
TRAMADOL UR QL: NOT DETECTED
ZOLPIDEM UR QL: NOT DETECTED

## 2020-11-12 ENCOUNTER — COMMUNICATION - HEALTHEAST (OUTPATIENT)
Dept: PALLIATIVE MEDICINE | Facility: OTHER | Age: 60
End: 2020-11-12

## 2020-11-12 DIAGNOSIS — G89.4 CHRONIC PAIN SYNDROME: ICD-10-CM

## 2021-01-05 ENCOUNTER — HOSPITAL ENCOUNTER (OUTPATIENT)
Dept: PALLIATIVE MEDICINE | Facility: OTHER | Age: 61
Discharge: HOME | End: 2021-01-05
Attending: PHYSICIAN ASSISTANT

## 2021-01-05 DIAGNOSIS — M25.50 PAIN IN JOINT, MULTIPLE SITES: ICD-10-CM

## 2021-01-05 DIAGNOSIS — Z79.899 MEDICAL CANNABIS USE: ICD-10-CM

## 2021-01-05 DIAGNOSIS — F11.90 CHRONIC, CONTINUOUS USE OF OPIOIDS: ICD-10-CM

## 2021-01-05 DIAGNOSIS — G89.4 CHRONIC PAIN SYNDROME: ICD-10-CM

## 2021-01-05 DIAGNOSIS — G03.9 ARACHNOIDITIS: ICD-10-CM

## 2021-01-15 ENCOUNTER — HEALTH MAINTENANCE LETTER (OUTPATIENT)
Age: 61
End: 2021-01-15

## 2021-03-02 ENCOUNTER — HOSPITAL ENCOUNTER (OUTPATIENT)
Dept: PALLIATIVE MEDICINE | Facility: OTHER | Age: 61
Discharge: HOME | End: 2021-03-02
Attending: PHYSICIAN ASSISTANT

## 2021-03-02 DIAGNOSIS — G03.9 ARACHNOIDITIS: ICD-10-CM

## 2021-03-02 DIAGNOSIS — G89.4 CHRONIC PAIN SYNDROME: ICD-10-CM

## 2021-03-02 DIAGNOSIS — Z79.899 MEDICAL CANNABIS USE: ICD-10-CM

## 2021-03-02 DIAGNOSIS — F11.90 CHRONIC, CONTINUOUS USE OF OPIOIDS: ICD-10-CM

## 2021-03-16 ENCOUNTER — COMMUNICATION - HEALTHEAST (OUTPATIENT)
Dept: PALLIATIVE MEDICINE | Facility: OTHER | Age: 61
End: 2021-03-16

## 2021-05-04 ENCOUNTER — HOSPITAL ENCOUNTER (OUTPATIENT)
Dept: PALLIATIVE MEDICINE | Facility: OTHER | Age: 61
Discharge: HOME | End: 2021-05-04
Attending: PHYSICIAN ASSISTANT
Payer: COMMERCIAL

## 2021-05-04 DIAGNOSIS — F11.90 CHRONIC, CONTINUOUS USE OF OPIOIDS: ICD-10-CM

## 2021-05-04 DIAGNOSIS — G89.4 CHRONIC PAIN SYNDROME: ICD-10-CM

## 2021-05-04 DIAGNOSIS — Z79.899 MEDICAL CANNABIS USE: ICD-10-CM

## 2021-05-04 DIAGNOSIS — M51.379 DEGENERATION OF LUMBAR OR LUMBOSACRAL INTERVERTEBRAL DISC: ICD-10-CM

## 2021-05-04 DIAGNOSIS — M25.50 PAIN IN JOINT, MULTIPLE SITES: ICD-10-CM

## 2021-05-27 NOTE — PATIENT INSTRUCTIONS - HE
Continue Morphine extended release 15 mg every 8 hours.  If you want to take a 30 mg dose in the morning, you may and then space the remaining dose out to 12 hours to not exceed 3 doses in 24 hours.  Continue Morphine immediate release 15 mg up to 2-3 tablets per day.  As your opioid dose remains stable, I will send electronic refills to the pharmacy for 2 subsequent months until your next appointment so you do not have to call our refill line.  The fill dates for your medications are:  MSIR: 05/02/19 & 05/30/19  MSER: 05/07/19 & 06/04/19  Check with your pharmacy that all prescriptions are on your profile. Call the pharmacy a week before you want to fill the prescription as stock may vary and the pharmacy may need to order the medication for you. I reserve the right to cancel the electronic prescription at the pharmacy in between appointments and would contact you with an explanation if this were to occur.  Continue use of medical cannabis as prescribed   You may restart ketamine 20 mg 1 every 4-6 hours as needed and increase to 40 mg every 4-6 hours as tolerated.  Use what you have at home and call for refill when needed.  Continue oxytocin 40 units 3 times a day for chronic pain - refill sent to [facility]  Continue PT/dry needling with Dilma as scheduled  You may schedule with Karson SandovalD for antidepressant medications with pharmacogenetic test results.  Keep attempts at scheduling with Dr. Ortiz, psychiatry  Follow-up in 8 weeks with Dolores BOLTON

## 2021-05-27 NOTE — TELEPHONE ENCOUNTER
Completed For use 4/3-5/1  Requested Prescriptions     Signed Prescriptions Disp Refills     morphine (MS CONTIN) 15 MG 12 hr tablet 84 tablet 0     Sig: Take 1 tablet (15 mg total) by mouth 3 (three) times a day.     Authorizing Provider: CM BLAKE     morphine (MSIR) 15 MG tablet 84 tablet 0     Sig: Take 1 tablet (15 mg total) by mouth 3 (three) times a day as needed.     Authorizing Provider: CM BLAKE

## 2021-05-28 NOTE — PROGRESS NOTES
"MTM Initial Encounter  Assessment & Plan                                                     Mood/Sleep: Unimproved - pt elected to stop Sertraline and continue with Shawn-E to see how her mood responds. In the future, could consider Fluvoxamine which is metabolized by CYP2D6. Pt is an intermediate metabolizer therefore would require lower doses. Fluvoxamine also inhibits HYM3B27 for which pt is a rapid metabolizer, so this may actually normalize 2C19 metabolism and allow for use of 2C19 medications.   -Pt will stop Sertraline   -Continue with Shawn-E 400 mg daily   Future Consideration: Fluvoxamine     Follow Up  Return in about 6 weeks (around 2019).      Subjective & Objective                                                     Keshia Arellano is a 58 y.o. female coming in for an initial visit for Medication Therapy Management. She was referred to me from Dolores Bobby PA-C.       Chief Complaint: Antidepressants with pharmacogenomic results     Mood/Sleep: Using Sertraline 25 mg 0.5 tabs a couple times per week and trazodone 50 mg daily, and S-adenosylmethionine (Shawn-E) 400 mg daily. Has tried higher doses of Sertraline for mood, but finds it \"activitating\" and causes sleep issues. At this low dose, pt is feeling better in terms of mood but still activated. Still waking up every several hours.     Pt has had pharmacogenomic testing completed on 2018 (see report in Media Tab).       Trazodone extensively metabolized by CY - pt is a normal metabolizer     Sertraline is primarily metabolized via N-demethylation catalyzed by CYP2B6 - pt is a poor metabolizer of CYP2B6 and would require lower doses or alternative medications.     -CPY1A2 hyperinducer: Expect higher than average doses of melatonin, R-warfarin (less active enantiomer), rozerem, duloxetine, amitriptyline if considered in the future      PMH: reviewed in EPIC   Allergies/ADRs: reviewed in EPIC   Alcohol: reviewed in EPIC   Tobacco: " "  Social History     Tobacco Use   Smoking Status Former Smoker   Smokeless Tobacco Never Used     Today's Vitals:   Vitals:    05/02/19 1433   BP: 133/88   Patient Site: Right Arm   Patient Position: Sitting   Pulse: 76   Resp: 16   Weight: 196 lb (88.9 kg)   Height: 5' 6\" (1.676 m)     ----------------    Much or all of the text in this note was generated through the use of Dragon Dictate voice-to-text software. Errors in spelling or words which seem out of context are unintentional. Sound alike errors, in particular, may have escaped editing.    The patient declined an after visit summary    I spent 45 minutes with this patient today.   All changes were made via collaborative practice agreement with   Dolores Bobby PA-C. A copy of the visit note was provided to the patient's provider.     Karson Dee, JaimeD  Medication Therapy Management (MTM) Pharmacist  [facility]        Current Outpatient Medications   Medication Sig Dispense Refill     sertraline (ZOLOFT) 25 MG tablet Take 25 mg by mouth every morning. Take half Tablet by mouth             CALCIUM GLYCEROPHOSPHATE (PRELIEF ORAL) Take 1 tablet by mouth daily as needed.       cholecalciferol, vitamin D3, 10,000 unit capsule Take 10,000 Units by mouth daily.       glutamine (L-GLUTAMINE) 500 mg cap Take 2-4 tablets by mouth every morning.       lisinopril (PRINIVIL,ZESTRIL) 5 MG tablet Take 5 mg by mouth.       lisinopril, bulk, 100 % Powd Take 5 mg by mouth.       melatonin 5 mg Subl Place 5 mg under the tongue at bedtime.       [START ON 6/4/2019] morphine (MS CONTIN) 15 MG 12 hr tablet Take 1 tablet (15 mg total) by mouth 3 (three) times a day. 84 tablet 0     [START ON 5/30/2019] morphine (MSIR) 15 MG tablet Take 1 tablet (15 mg total) by mouth 3 (three) times a day as needed. 84 tablet 0     NON FORMULARY Inhale. Take 2-6 puffs, 1-5 times daily. THC dominant       NON FORMULARY Inhale. Take 1-5 puffs, 1-5 times daily. For " CBD/THC equal amounts.       NON FORMULARY        oxytocin, bulk, Powd Take 40 units three times a day for pain.  Dispense #90 with 1 refill. 1 g 0     traZODone (DESYREL) 50 MG tablet 150 mg at bedtime. Take 150 mg Daily at bedtime       UNABLE TO FIND Take 2 capsules by mouth daily Med Name: .       No current facility-administered medications for this encounter.

## 2021-05-29 NOTE — PATIENT INSTRUCTIONS - HE
Continue Morphine extended release 15 mg every 8 hours.   Continue Morphine immediate release 15 mg up to 2-3 tablets per day.  As your opioid dose remains stable, I will send electronic refills to the pharmacy for 2 subsequent months until your next appointment so you do not have to call our refill line.  The fill dates for your medications are:  MSIR: 06/28/19 & 07/26/19  MSER: 06/06/19 (for 22 days) to refill on 06/28/19 with MSIR & 07/26/19  Check with your pharmacy that all prescriptions are on your profile. Call the pharmacy a week before you want to fill the prescription as stock may vary and the pharmacy may need to order the medication for you. I reserve the right to cancel the electronic prescription at the pharmacy in between appointments and would contact you with an explanation if this were to occur.  Continue use of medical cannabis as prescribed.  Registration renewal completed today for August.   Discontinue ketamine due to little pain relief and side effects.  Continue oxytocin 40 units 3 times a day for chronic pain   Continue PT/dry needling with Dilma as scheduled  Keep Orthotics appointment next week  Keep follow-up with Karson SandovalD   Follow-up in 8 weeks with Dolores BOLTON

## 2021-05-30 VITALS — BODY MASS INDEX: 33.9 KG/M2 | WEIGHT: 216 LBS | HEIGHT: 67 IN

## 2021-05-30 VITALS — HEIGHT: 67 IN | BODY MASS INDEX: 33.9 KG/M2 | WEIGHT: 216 LBS

## 2021-05-31 VITALS — BODY MASS INDEX: 34.39 KG/M2 | WEIGHT: 214 LBS | HEIGHT: 66 IN

## 2021-05-31 VITALS — BODY MASS INDEX: 31.98 KG/M2 | WEIGHT: 199 LBS | HEIGHT: 66 IN

## 2021-05-31 VITALS — WEIGHT: 214 LBS | HEIGHT: 66 IN | BODY MASS INDEX: 34.39 KG/M2

## 2021-05-31 VITALS — HEIGHT: 66 IN | WEIGHT: 215 LBS | BODY MASS INDEX: 34.55 KG/M2

## 2021-05-31 NOTE — PROGRESS NOTES
Pt. Is here to follow up with Dolores Bobby PA-C  for back and neck pain.    pill count  Morphine 15 IR # 69   morphine 15 ER  # 69

## 2021-06-01 VITALS — WEIGHT: 196 LBS | BODY MASS INDEX: 31.5 KG/M2 | HEIGHT: 66 IN

## 2021-06-01 VITALS — WEIGHT: 186 LBS | HEIGHT: 66 IN | BODY MASS INDEX: 29.89 KG/M2

## 2021-06-01 VITALS — HEIGHT: 66 IN | WEIGHT: 186 LBS | BODY MASS INDEX: 29.89 KG/M2

## 2021-06-01 VITALS — HEIGHT: 66 IN | WEIGHT: 196 LBS | BODY MASS INDEX: 31.5 KG/M2

## 2021-06-01 VITALS — BODY MASS INDEX: 29.89 KG/M2 | WEIGHT: 186 LBS | HEIGHT: 66 IN

## 2021-06-02 VITALS — HEIGHT: 66 IN | WEIGHT: 196 LBS | BODY MASS INDEX: 31.5 KG/M2

## 2021-06-02 VITALS — WEIGHT: 196 LBS | HEIGHT: 66 IN | BODY MASS INDEX: 31.5 KG/M2

## 2021-06-02 VITALS — WEIGHT: 196 LBS | BODY MASS INDEX: 31.5 KG/M2 | HEIGHT: 66 IN

## 2021-06-02 VITALS — BODY MASS INDEX: 31.5 KG/M2 | HEIGHT: 66 IN | WEIGHT: 196 LBS

## 2021-06-02 NOTE — PATIENT INSTRUCTIONS - HE
Continue Morphine extended release 15 mg every 8 hours.   Continue Morphine immediate release 15 mg up to 2-3 tablets per day.  Refill sent for 19 for use on   Due to changes in [place] laws, effective 2019, prescriptions for any opioid pain relievers can only be filled for 30 days from the date the prescription was written, or for medications that can be refilled, any refills must be filled within 30 days of your last fill. If you do not fill within 30 days, the prescription will  and you will need a brand new prescription.   In order to provide you with continued access to your prescriptions, we will be switching your prescriptions to a 28 day supply or less. It is your responsibility to get your prescriptions filled before the 30 day expiration date. Failure to do so may cause delays in getting your medications.  Check with your pharmacy that all prescriptions are on your profile. Call the pharmacy a week before you want to fill the prescription as stock may vary and the pharmacy may need to order the medication for you. I reserve the right to cancel the electronic prescription at the pharmacy in between appointments and would contact you with an explanation if this were to occur.  Please call our phone # [phone] and choose option #4 to request a medication refill seven days in advance for your second month opioid prescription refill to be sent electronically to your pharmacy.  We will not return a phone call when the refill is sent to your pharmacy, but expect you to communicate with your pharmacy to ensure it is received and ready by the date needed.  Continue use of medical cannabis as prescribed.     Continue PT/dry needling with Dilma as scheduled and also work on right wrist pain (new).  This is resolving, but if needed can consider evaluation with upper extremity orthopedic specialist.  Diagnostics: UDT/SWAB collected today results are pending.  Patient required a random  Urine Drug Test due to the need to comply with [place] Model Policy Guidelines and CDC Guideline for the use of any controlled substances. Reasons for definitive testing include:  -Identify specific medication(s) or drug(s) in a class (e.g. Benzodiazepines, barbiturates, opioids, antidepressants)  -Definitive concentration of medication(s), drug(s), and/or metabolites needed to guide management  -Identify non-prescribed medication or illicit drug use for ongoing safe prescribing of controlled substances  -Identify substances that may present high risk for additive or synergistic interaction with prescription medication (e.g. Alcohol).  Patient is either being considered for or taking a controlled substance. Unexpected findings will be discussed and treatment decision may be adjusted. Testing is being implemented across the board randomly w/o bias related to age, race, gender, socioeconomic status or [employer]an affiliation.   Opioid agreement and consent form signed today.  Discussed bringing these copies and your most recent After Visit Summary (AVS) from our appointment to the pharmacy when you are refilling controlled medications to assist with any additional information the pharmacist may require.   Follow-up in 8 weeks with Dolores BOLTON

## 2021-06-03 ENCOUNTER — RECORDS - HEALTHEAST (OUTPATIENT)
Dept: ADMINISTRATIVE | Facility: CLINIC | Age: 61
End: 2021-06-03

## 2021-06-03 VITALS — BODY MASS INDEX: 30.37 KG/M2 | HEIGHT: 66 IN | WEIGHT: 189 LBS

## 2021-06-03 VITALS — HEIGHT: 66 IN | BODY MASS INDEX: 31.5 KG/M2 | WEIGHT: 196 LBS

## 2021-06-03 VITALS — HEIGHT: 66 IN | WEIGHT: 190 LBS | BODY MASS INDEX: 30.53 KG/M2

## 2021-06-03 VITALS — BODY MASS INDEX: 30.53 KG/M2 | HEIGHT: 66 IN | WEIGHT: 190 LBS

## 2021-06-04 VITALS
DIASTOLIC BLOOD PRESSURE: 70 MMHG | WEIGHT: 191 LBS | HEART RATE: 88 BPM | SYSTOLIC BLOOD PRESSURE: 134 MMHG | OXYGEN SATURATION: 95 % | BODY MASS INDEX: 30.7 KG/M2 | HEIGHT: 66 IN

## 2021-06-04 VITALS — HEIGHT: 66 IN | WEIGHT: 190 LBS | BODY MASS INDEX: 30.53 KG/M2

## 2021-06-04 VITALS — HEIGHT: 66 IN | BODY MASS INDEX: 30.05 KG/M2 | WEIGHT: 187 LBS

## 2021-06-04 NOTE — PROGRESS NOTES
Patient presents to the clinic today for a follow up with Dolores Bobby PA-C regarding neck, back and leg pain. Patient describes their pain as sore, aching and deep. Her/His function score is 8.      Pill count done today:  Morphine IR 15mg = 73 pills  Morphine ER 5mg =72 pills

## 2021-06-04 NOTE — PATIENT INSTRUCTIONS - HE
Continue Morphine extended release 15 mg every 8 hours.   Continue Morphine immediate release 15 mg up to 2-3 tablets per day.  Sent refill for 01/19/20 as you have at least 24 days left of current medications  Check with your pharmacy that all prescriptions are on your profile. Call the pharmacy a week before you want to fill the prescription as stock may vary and the pharmacy may need to order the medication for you. I reserve the right to cancel the electronic prescription at the pharmacy in between appointments and would contact you with an explanation if this were to occur.  Please call our phone # [phone] and choose option #4 to request a medication refill seven days in advance for your second month opioid prescription refill to be sent electronically to your pharmacy.  We will not return a phone call when the refill is sent to your pharmacy, but expect you to communicate with your pharmacy to ensure it is received and ready by the date needed.  Continue use of medical cannabis as prescribed.     Continue PT/dry needling with Dilma as scheduled.  Ask if she is doing any craniosacral techniques for neck pain. Discussed TPI for your neck with Dr. Arellano as needed.  Sleep consult scheduled to evaluate for sleep study/RANJIT.  In the interim, provided refill for trazodone 50 mg up to 150 mg at night and may continue melatonin 10 mg at night.  Patient on waiting list for psychiatrist Dr. Ortiz.  Will also change PCP when Dr. Farr retires in February.  Follow-up in 8 weeks with Dolores BOLTON
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
## 2021-06-05 NOTE — TELEPHONE ENCOUNTER
Prior Authorization Request  Who s requesting:  Pharmacy/[facility]  Pharmacy Name and Location: [facility]/[place]  Medication Name: MSER 15mg, 3/day  Insurance Plan: [employer]  Insurance Member ID Number:    CoverMyMeds Key: [id number]  Informed patient that prior authorizations can take up to 10 business days for response:   Yes  Okay to leave a detailed message: No

## 2021-06-05 NOTE — TELEPHONE ENCOUNTER
----- Message from Razia Lira CNP sent at 12/30/2019 12:01 PM CST -----  Barry Bernal     Generally it is connected to gut health and serotonin levels- unhealthy gut unable to produce serotonin- generally starts worsening if vitamin D is low with the reduced sunlight as well as changing to more gluten based foods-comfort foods. I have a class coming up in January for depression specifically. Right now the recommendations include ample Vitamin D 3 5000 units per day with Magnesium glycinate ( more extended release) 400 mg at bedtime together avoiding grains all together and increasing veggies as well as proteins.      I will update you once I hear more.     Razia   ----- Message -----  From: Dolores Bobby PA-C  Sent: 12/26/2019   3:41 PM CST  To: CELIA Fontaine    This is not urgent.  Wondering if you have received any functional med information for patients with SAD.  This patient is using light box, SamE, and L-glutamine 1,000-2,000 mg daily and high protein diet.  Any other suggestions?    Thanks  Dolores

## 2021-06-05 NOTE — PATIENT INSTRUCTIONS - HE
What is a Home Sleep Study?    your doctor can give you a portable sleep monitor to use at home, so you don t have to spend the night in the sleep lab. But you should use a portable monitor only if:   ?Your doctor thinks you have a condition that makes you stop breathing for short periods while you are asleep, called  sleep apnea.    ?You do not have other serious medical problems, such as heart disease or lung disease.    Please bring the home sleep study device back to the sleep center as soon as you are finished with it so we can score it.     The cost of care estimate line is [phone]. They are able to give the patient an estimate of the charges and also an estimate of their insurance coverage/patient responsibility.   After your sleep study is performed, please call us at [phone] to schedule for a follow up to review the results of the sleep study.    Consider using one tab of low dose melatonin 3 mg or less on the night of the study.    It is completely voluntary.    Do not drive or operate machinery after intake of melatonin.

## 2021-06-06 NOTE — PROGRESS NOTES
Patient presents to the clinic today for a follow up with Dolores Bobby PA-C regarding neck, back, and chest pain. Patient describes their pain as sore, aching, deep, constant, heavy, pinching. Her function score is 8.

## 2021-06-06 NOTE — TELEPHONE ENCOUNTER
2020 Prior Authorization Request  Who's requesting PA: Pharmacy  Provider: [facility]  Pharmacy Name, Location & Phone # : [phone]  Medication Name: trazodone 50mg, 3/hs  Quantity: 90   Refills: 1  Days Supply: 30  Medication Instructions: one to three tabs at bedtime   Insurance Plan: MEDICA  Insurance Member ID & GRP # : [id number]  CoverMyMeds Key:  Informed patient that prior authorizations can take up to 10 business days for response: YES  Okay to leave a detailed message: NO      Route to: HE PA MED ([id number])

## 2021-06-06 NOTE — TELEPHONE ENCOUNTER
Central PA team  [phone]  Pool: HE PA MED ([id number])          PA has been initiated.       PA form completed and faxed insurance via Cover My Meds     Key:  Manually faxed     Medication:  traZODone HCl 50MG tablets    Insurance:  [employer]         Response will be received via fax and may take up to 5-10 business days depending on plan

## 2021-06-06 NOTE — PATIENT INSTRUCTIONS - HE
Continue Morphine extended release 15 mg every 8 hours.   Continue Morphine immediate release 15 mg up to 2-3 tablets per day.  Sent refill with today's date so they can be processed at the same time.    Check with your pharmacy that all prescriptions are on your profile. Call the pharmacy a week before you want to fill the prescription as stock may vary and the pharmacy may need to order the medication for you. I reserve the right to cancel the electronic prescription at the pharmacy in between appointments and would contact you with an explanation if this were to occur.  Please call our phone # [phone] and choose option #4 to request a medication refill seven days in advance for your second month opioid prescription refill to be sent electronically to your pharmacy.  We will not return a phone call when the refill is sent to your pharmacy, but expect you to communicate with your pharmacy to ensure it is received and ready by the date needed.  Continue use of medical cannabis as prescribed.     Continue PT/dry needling with Dilma as scheduled.   Sleep consult scheduled with Rena for sleep study/RANJIT.  In the interim, provided refill for trazodone 50 mg up to 150 mg at night and may continue melatonin 5-10 mg at night.  Follow-up in 8 weeks with Dolores BOLTON

## 2021-06-07 NOTE — PATIENT INSTRUCTIONS - HE
Continue Morphine extended release 15 mg every 8 hours.   Continue Morphine immediate release 15 mg up to 2-3 tablets per day.  Refills sent for 04/24/20; to keep on the same refill schedule, we subtracted the amount from MSIR that you still will have remaining on that date.    Check with your pharmacy that all prescriptions are on your profile. Call the pharmacy a week before you want to fill the prescription as stock may vary and the pharmacy may need to order the medication for you. I reserve the right to cancel the electronic prescription at the pharmacy in between appointments and would contact you with an explanation if this were to occur.  Please call our phone # [phone] and choose option #4 to request a medication refill seven days in advance for your second month opioid prescription refill to be sent electronically to your pharmacy.  We will not return a phone call when the refill is sent to your pharmacy, but expect you to communicate with your pharmacy to ensure it is received and ready by the date needed.  Continue use of medical cannabis as prescribed.     Follow-up on sleep recommendations, pending CBT visit in May.  Resume PT/dry needling with Dilma as scheduled 06/01/20.   Follow-up in 8 weeks with Dolores BOLTON

## 2021-06-08 NOTE — TELEPHONE ENCOUNTER
Approved.  Please make sure pharmacy only has 1 copy of the MSER on file to fill on 05/22 for #84.

## 2021-06-08 NOTE — TELEPHONE ENCOUNTER
Ordered; she was previously on 40 units three times a day so I sent that dose to resume.    
Patient calls, she would like to trial the oxytocin that was discussed with provider.  She would like to have this sent to [facility]  
darkened lights/plan of care explained/side rails up/treatment delay explained/wait time explained
side rails up/treatment delay explained/wait time explained/warm blanket provided

## 2021-06-08 NOTE — PATIENT INSTRUCTIONS - HE
Continue Morphine extended release 15 mg every 8 hours.   Continue Morphine immediate release 15 mg up to 2-3 tablets per day.  Refills sent for 06/22/20  Check with your pharmacy that all prescriptions are on your profile. Call the pharmacy a week before you want to fill the prescription as stock may vary and the pharmacy may need to order the medication for you. I reserve the right to cancel the electronic prescription at the pharmacy in between appointments and would contact you with an explanation if this were to occur.  Please call our phone # [phone] and choose option #4 to request a medication refill seven days in advance for your second month opioid prescription refill to be sent electronically to your pharmacy.  We will not return a phone call when the refill is sent to your pharmacy, but expect you to communicate with your pharmacy to ensure it is received and ready by the date needed.  Continue use of medical cannabis as prescribed.   Registration completed today  Continue PT as scheduled for July/August  Continue oxytocin 40 units twice a day - call when refills needed to [place]  Continue trazodone 100-150 mg at night for sleep with low dose melatonin   Follow-up in 8 weeks with Dolores BOLTON

## 2021-06-08 NOTE — TELEPHONE ENCOUNTER
Patient called to state refill for MSIR 15 mg sent to pharmacy today was for an amount of 56 tabs and should be 84 tabs for 28 day supply.  Review of chart shows ordered 3xd PRN x28 day will be 84 tabs.  Please requeue for provider RW   Please Call pharmacy to cancel RX

## 2021-06-09 NOTE — TELEPHONE ENCOUNTER
Her last MSIR was for #9 tabs per , is that correct?  Unclear how that would have lasted her until today.  Please review with pharmacy prior to these refills being approved.

## 2021-06-09 NOTE — TELEPHONE ENCOUNTER
Call placed to pharmacy:  #74 tablets were picked up on 6/24 of MSIR 15 mg.  Apparently this did not get reported on the  and the pharmacy is now aware of this.

## 2021-06-09 NOTE — PROGRESS NOTES
PAIN CLINIC PROGRESS NOTE    Subjective:   Keshia Arellano is a 56 y.o. female who presents for evaluation of low back pain.  She has history of lumbar arachnoiditis.  She has had numerous lumbar epidural injections in the past; worried to do repeat injections due to concern of worsening her arachnoiditis.  She has been educated on possible lumbar facet treatments or medial branch blocks to treat axial low back pain. She has other multisite pain including pelvic pain, cervicalgia, TMJ, myalgias.    Major issues:  1. Chronic pain syndrome    2. Lumbar Disc Degeneration    3. Cervical Disc Degeneration    4. Opioid Dependence With Continuous Use    5. Medical cannabis use      Pain Score: 7/10 constant pinching pain in lower back and left leg, left neck and shoulder. Function rated 8. Last visit pain rated 7/10.  No changes, pain levels stable.   Radiation of pain: neck radiates to shoulders lower back pain with radiation to hip  Paresthesias, numbness, weakness: Denies any recent numbness in her upper extremities.    Gait disturbance: positive. Ambulating with cane, denies recent falls.  Exacerbating/relieving factors: Pain improves with rest, medications, heating pad, bath with epsom salt/aromatherapy, medical cannabis.  Has help with cleaning, laundry as these activities exacerbate pain.  Grocery shopping increases her pain but she does have a cart she can use at her apartment which helps.    Adverse effects of medications: constipation; using flax seed, dietary, senna 2-3/day.  Improved since taking less opioids daily.  Started supplement prune lax.  Using miralax frequently, not daily.  Some nausea and GERD.  Associated symptoms: Occipital headaches, spasms, weight gain, depression, sleep interruption due to pain, pelvic pain symptoms  Current treatment efficacy: Good.  Has been able to reduce dosing of morphine (see below). Uses Naproxen for neck pain intermittently. Not having to take Baclofen  currently  Current treatment compliance: Good.  Reducing opioid medications.  Intermittent therapy with pelvic , continues with home therapy to help with pain flares.      She states she is taking less prescribed daily dose of morphine since medical cannabis.  She is averaging use of vapor 3 times per day.  Uses at night to help with sleep.  Uses Dilma in the morning and tangerine in the evening.  She states some drowsiness.  Initially tangerine made her feel isolated but that was temporary.  She states now her mood has improved as she is in less pain has less depression and reports even her sister noticed and has made positive remarks about her mood.  She states she discontinued trazodone as sleep has improved and still hasn't taken a baclofen since starting and also naproxen only a couple times since visit.  She states she did start lisinopril and diuretic for blood pressure.  She feels she cannot drive and be as present after using medical cannabis and uses later in the evening.      No changes in health history since last seen.    Occipital headache bilaterally today which was triggered by changing a light bulb and looking up for a prolonged period.  She reports she will use home traction to help.  Also tries heat, ice, essential oils, compounded cream, CBD lotion.    She has a light box to use for mood this time of year.  Often forgets to use it.  Denies vitamin D level labs.  She states she hasn't been consistent with vitamin supplementation.  Tries to take 10,000 IU daily.  She states taking 50,000 weekly she was still low end of normal.     She is reducing morphine. She brought in medications today.  She usually takes MSER 15 mg 2-3 times per day and MSIR 15 mg 2-3 times per day.  She states she has not taken 4 per day recently which is how it is still written.  Comfortable reducing to 3 per day.  Has not had to take Baclofen or Naproxen recently.      Review of Systems  A 12 point ROS was  "completed and was positive for headache, TMJ, pelvic pain, arthralgias, constipation, low back pain, weakness, depression with anxiety, sleep disturbance, visual disturbance requiring glasses, GERD.  Urinary urgency and sometimes urinary incontinence.   Weight loss 8 pounds due to less compulsive eating.  Otherwise negative.     Objective:     Vitals:    02/21/17 1539   BP: 139/86   Pulse: 74   Resp: 16   Weight: 216 lb (98 kg)   Height: 5' 6.5\" (1.689 m)   PainSc:   7     Physical Exam  General- patient is alert and oriented, in NAD, well-groomed, well-nourished.  Obese, appears stated age.  Presents alone today.  Appears tired, uncomfortable due to headache.  Psych- Judgment and insight normal, AOx4, recent and remote memory normal, mood and affect appropriate.  Respiratory- breathing is non-labored, regular rate and rhythm.  Cardiovascular- extremities warm and well perfused, no peripheral edema or varicosities  Dermatologic - Exposed skin is CDI  Musculoskeletal - Ambulating with single-point cane with slight antalgia on the right.  Able to sit to stand without difficulty.     *Opioid [place] Precautions:    UDS/Swab - 10/10/16 appropriate  Opioid Consent - due  Opioid Agreement - 08/10/2016  Pharmacy- as documented    [place]  Reviewed - 02/21/17 as expected, last refill MSIR and MSER was bridge for 8 days on 02/13/17  Pill Count - 02/21/17  Psychological evaluation - outside source  MME - 90  Pharmacogenetic testing - yes  UDS from 10/10/16 appropriate    Imaging:  None new.    Assessment:   Keshia Arellano is a 56 y.o. female seen in clinic today for chronic intractable lower back pain secondary to arachnoiditis that affects her QOL and ADLs.  Symptoms are stable and improved with MSContin and MSIR. She started medical cannabis and has done opioid reduction, reports improved sleep and mood and reduction in oral adjunct medications.        Plan:   Reduce morphine extended release with change in directions to " 15 mg 3 times a day.    Reduce morphine immediate release 15 mg up to 3 tablets per day.  Patient requesting to reduce dosing so if any concerns with less quantity she will call nurse line.  Pill count shows MSER #22 and MSIR #24  Refill based on count will be on or after 02/28/17   You may take Naproxen up to 500 mg dose twice a day as needed take with food.  Use with current headache from neck strain and use traction device at home.  Patient has not had to take Baclofen recently, no refill needed  Continue use of compounded cream and CBD lotion as needed to painful areas up to 4 times daily  Follow up in 8 weeks with Dolores.    Dolores Bobby PA-C  [facility]  [address]  [place] [address]  Ph: [phone]  Fax: [phone]

## 2021-06-10 NOTE — PATIENT INSTRUCTIONS - HE
Continue Morphine extended release 15 mg every 8 hours.   Continue Morphine immediate release 15 mg up to 2-3 tablets per day.  Refill sent for #66 tabs to last 28 days as you have excess remaining from this month.    Refills sent for 08/21/20 to start on 08/23/20   Check with your pharmacy that all prescriptions are on your profile. Call the pharmacy a week before you want to fill the prescription as stock may vary and the pharmacy may need to order the medication for you. I reserve the right to cancel the electronic prescription at the pharmacy in between appointments and would contact you with an explanation if this were to occur.  Please call our phone # [phone] and choose option #4 to request a medication refill seven days in advance for your second month opioid prescription refill to be sent electronically to your pharmacy.  We will not return a phone call when the refill is sent to your pharmacy, but expect you to communicate with your pharmacy to ensure it is received and ready by the date needed.  Continue use of medical cannabis as prescribed.     Continue PT as scheduled for August Hold on oxytocin due to cost and stable symptoms.  Continue trazodone with melatonin - lower dose of 25 mg take at bedtime; if you wish to reduce further can take every other night or attempt to cut tab into 1/4 (12.5 mg dose).   Follow-up in 8 weeks with Dolores delaney

## 2021-06-10 NOTE — PROGRESS NOTES
"Keshia Arellano is a 59 y.o. female who is being evaluated via a billable telephone visit.      The patient has been notified of following:     \"This telephone visit will be conducted via a call between you and your physician/provider. We have found that certain health care needs can be provided without the need for a physical exam.  This service lets us provide the care you need with a short phone conversation.  If a prescription is necessary we can send it directly to your pharmacy.  If lab work is needed we can place an order for that and you can then stop by our lab to have the test done at a later time. Telephone visits are billed at different rates depending on your insurance coverage. During this emergency period, for some insurers they may be billed the same as an in-person visit.  Please reach out to your insurance provider with any questions.\"    Patient has given verbal consent to a Telephone visit? Yes    What phone number would you like to be contacted at? [phone]    Patient would like to receive their AVS by AVS Preference: [place].    Pain score: 5  What does your pain feel like: intermittent, sore, aching, deep  Does the pain interfere with:  Work: yes  Walking/distance: yes  Sleep: yes  Daily activities: yes  Relationships/social life: yes  Mood: yes  F= 8          "

## 2021-06-10 NOTE — PROGRESS NOTES
PAIN CLINIC PROGRESS NOTE    Subjective:   Keshia Arellano is a 56 y.o. female who presents for evaluation of low back pain.  She has history of lumbar arachnoiditis.  She has had numerous lumbar epidural injections in the past; worried to do repeat injections due to concern of worsening her arachnoiditis.  She has been educated on possible lumbar facet treatments or medial branch blocks to treat axial low back pain. She has other multisite pain including pelvic pain, cervicalgia, TMJ, myalgias.    Major issues:  1. Chronic pain syndrome      Pain Score: 6/10 constant aching, sore, penetrating pinching pain in lower back and left leg, left neck and shoulder. Function rated 7. Last visit pain rated 7/10.  No changes in pain symptoms.   Radiation of pain: neck radiates to shoulders lower back pain with radiation to hip  Paresthesias, numbness, weakness: Denies any recent numbness in her upper extremities.    Gait disturbance: positive. Ambulating with cane, denies recent falls.  Exacerbating/relieving factors: Pain improves with rest, medications, heating pad, bath with epsom salt/aromatherapy, medical cannabis.  Has help with cleaning, laundry as these activities exacerbate pain.  Grocery shopping increases her pain but she does have a cart she can use at her apartment which helps.    Adverse effects of medications: constipation; using flax seed, dietary, senna 2-3/day.  Improved since taking less opioids daily.  Started supplement prune lax.  Using miralax frequently, not daily.  Some nausea and GERD.  Associated symptoms: Occipital headaches, spasms, weight gain, depression, sleep interruption due to pain, pelvic pain symptoms  Current treatment efficacy: Good.  Has been able to reduce dosing of morphine (see below). Uses Naproxen for neck pain intermittently. Not having to take Baclofen currently  Current treatment compliance: Good.  Reducing opioid medications.  Intermittent therapy with pelvic ,  continues with home therapy to help with pain flares.      Pain relief is varied.  Difficulty with sleeping and waking often unsure if due to pain or other triggers.  She did stop trazodone. She has talked to psychiatrist previously about this.  She did take one trazodone 50 mg last night and slept well.  Has tried cream to help sleep and doesn't find that helpful.  She is careful for caffie intake.  Insurance will allow acupuncture for 12 visits but she hasn't done recently to see if assistive for insomnia or pain relief.      She states she is taking less prescribed daily dose of morphine since medical cannabis.  She is averaging use of vapor 3 times per day.  Uses at night to help with sleep.  Uses Dilma in the morning and tangerine in the evening.  She states some drowsiness.  Initially tangerine made her feel isolated but that was temporary.  She states now her mood has improved as she is in less pain has less depression and reports even her sister noticed and has made positive remarks about her mood.  She states she discontinued trazodone as sleep has improved and still hasn't taken a baclofen since starting and also naproxen only a couple times since visit.  She states she did start lisinopril and diuretic for blood pressure.  She feels she cannot drive after using medical cannabis and be as present so uses later in the evening as she does feel sedated from it. She asks if it is okay to take her morphine doses TID during the day.    Review of Systems  A 12 point ROS was completed and was positive for headache, TMJ, pelvic pain, arthralgias, constipation, low back pain, weakness, depression with anxiety, sleep disturbance, visual disturbance requiring glasses, GERD.  Urinary urgency and sometimes urinary incontinence.   Weight loss 8 pounds due to less compulsive eating.  Otherwise negative.     Objective:     Vitals:    04/19/17 1554   BP: 117/76   Pulse: 73   Resp: 14   Weight: 216 lb (98 kg)   Height: 5'  "6.5\" (1.689 m)   PainSc:   6     Physical Exam  General- patient is alert and oriented, in NAD, well-groomed, well-nourished.  Obese, appears stated age.  Presents alone today.  Psych- Judgment and insight normal, AOx4, recent and remote memory normal, mood and affect appropriate.  Respiratory- breathing is non-labored, regular rate and rhythm.  Cardiovascular- extremities warm and well perfused, no peripheral edema or varicosities  Dermatologic - Exposed skin is CDI  Musculoskeletal - Ambulating with single-point cane with slight antalgia on the right.  Able to sit to stand without difficulty.     *Opioid [place] Precautions:    UDS/Swab - 10/10/16 appropriate  Opioid Consent - due  Opioid Agreement - 08/10/2016  Pharmacy- as documented    [place]  Reviewed - 04/19/17 as expected  Pill Count - 04/19/17 as expected  Psychological evaluation - outside source  MME - up to 90  Pharmacogenetic testing - yes    Imaging:  None new.    Assessment:   Keshia Arellano is a 56 y.o. female seen in clinic today for chronic intractable lower back pain secondary to arachnoiditis that affects her QOL and ADLs.  Symptoms are stable and improved with MSContin and MSIR. She started medical cannabis and has done opioid reduction, reports improved sleep and mood and reduction in oral adjunct medications.        Plan:   Morphine extended release 15 mg 3 times a day.    Morphine immediate release 15 mg up to 3 tablets per day.  Pill count shows MSER #50 and MSIR #54 from refill date 03/29/17 for 28 day supply.  Refill based on count will be on or after 05/05/17  Try to journal pain symptoms for a couple weeks to see patterns and then will help if any adjustments in medications needed  Continue medical cannabis and see pharmacist there to discuss sleep concerns and also highest CBD [place] option for daytime use.  You may take Naproxen up to 500 mg dose twice a day as needed take with food.    Patient has not had to take Baclofen recently, " no refill needed  Continue use of compounded cream and CBD lotion as needed to painful areas up to 4 times daily  Patient may take trazodone 50 mg at night for sleep and we also discuss other options for sleep concerns including sleep consult (we have a specialist at [facility] if you need a referral) or acupuncture  Follow up in 8 weeks with Dolores.    Dolores Bobby PA-C  [facility]  [address] [place] [address]. [address]  [place] [address]  Ph: [phone]  Fax: [phone]

## 2021-06-11 NOTE — TELEPHONE ENCOUNTER
I see the template for Morphine but that was not queued for me; please check if it was sent to someone else or still needs to be completed.  I did authorize oxytocin although last visit in August she was not using it, mood was stable.

## 2021-06-11 NOTE — TELEPHONE ENCOUNTER
Medication being requested: MS Contin, MSIR  Last visit date: 08/11.  Provider: IRVING  Next visit date: 10/13.  Provider: IRVING  Expected follow up: 8 wks  MTM visit (Pain Center) date: N/A  UDT date: 10/2019  Agreement date: 10/2019   (Last fill date; name; strength; provider; MME; quantity):  Both filled 08/22-09/19. MSIR was for #66 tabs as pt had some left over, MS Contin for #84  Pertinent between visit information about requested medication (telephone, mychart, prior authorization, concerns, comments): None noted  Script being sent to provider by nurse- dates and quantity:   Requested Prescriptions     Pending Prescriptions Disp Refills     morphine (MSIR) 15 MG tablet 84 tablet 0     Sig: Take 1 tablet (15 mg total) by mouth 3 (three) times a day as needed.     morphine (MS CONTIN) 15 MG 12 hr tablet 84 tablet 0     Sig: Take 1 tablet (15 mg total) by mouth 3 (three) times a day.   Fill 9/19  Pharmacy cued: [facility]  Standing orders for withdrawal protocol implemented: N/A

## 2021-06-11 NOTE — TELEPHONE ENCOUNTER
Medication being requested: OXYTOCIN 40 IU   Last visit date: 8/11/20 Provider: IRVING  Next visit date: 10/13/20 Provider: IRVING  Expected follow up: 8 WEEKS  MTM visit (Pain Center) date:NO  Pertinent between visit information about requested medication (telephone, mychart, prior authorization, concerns): NA  Last date prescribed: 5/26/20  Provider responsible: IRVING  Spoke with patient: NO  Script being sent to provider - dates and quantity:  Requested Prescriptions     Pending Prescriptions Disp Refills     oxytocin, bulk, Powd 1 g 0     Sig: Use 40 Units As Directed 3 (three) times a day. Dispense for quantity #90 to last 30 days      Pharmacy cued: [facility]

## 2021-06-12 NOTE — PROGRESS NOTES
Urine Drug Test: 10/16/2020     Medication:   Last dose of scheduled / tracked / medical cannabis / CBD: Morphine 15 last taken on 10/16/20 @ 1230pm ;MSIR 15 last taken on 10/16/20 @ 1230pm    Witnessed Patch Location: n/a    Medication Current List    Current Outpatient Medications:      atorvastatin (LIPITOR) 20 MG tablet, Take 20 mg by mouth., Disp: , Rfl:      cholecalciferol, vitamin D3, 10,000 unit capsule, Take 10,000 Units by mouth daily., Disp: , Rfl:      lisinopril (PRINIVIL,ZESTRIL) 5 MG tablet, Take 5 mg by mouth., Disp: , Rfl:      melatonin 5 mg Subl, Place 10 mg under the tongue at bedtime. , Disp: , Rfl:      metoprolol succinate (TOPROL-XL) 25 MG, Take 25 mg by mouth daily., Disp: , Rfl:      [START ON 10/21/2020] morphine (MS CONTIN) 15 MG 12 hr tablet, Take 1 tablet (15 mg total) by mouth 3 (three) times a day., Disp: 84 tablet, Rfl: 0     [START ON 10/21/2020] morphine (MSIR) 15 MG tablet, Take 1 tablet (15 mg total) by mouth 3 (three) times a day as needed., Disp: 84 tablet, Rfl: 0     naloxone (NARCAN) 4 mg/actuation nasal spray, 1 spray (4 mg dose) into one nostril for opioid reversal. Call 911. May repeat if no response in 3 minutes., Disp: 1 Box, Rfl: 0     NON FORMULARY, Inhale. Take 2-6 puffs, 1-5 times daily. THC dominant    , Disp: , Rfl:      NON FORMULARY, Inhale. Take 1-5 puffs, 1-5 times daily. For CBD/THC equal amounts.    , Disp: , Rfl:      NON FORMULARY, , Disp: , Rfl:      oxytocin, bulk, Powd, Use 40 Units As Directed 3 (three) times a day. Dispense for quantity #90 to last 30 days, Disp: 1 g, Rfl: 0     sertraline (ZOLOFT) 50 MG tablet, TK 1/2 T PO QD FOR 1 WK THEN TK 1 T PO QD, Disp: , Rfl:      traZODone (DESYREL) 50 MG tablet, 1-3 tablets by mouth at bedtime as needed for insomnia., Disp: 270 tablet, Rfl: 0    Personal belongings: Left outside the bathroom.    Comments: Consent signed

## 2021-06-12 NOTE — PROGRESS NOTES
"Keshia Arellano is a 59 y.o. female who is being evaluated via a billable telephone visit.       The patient has been notified of following:      \"This telephone visit will be conducted via a call between you and your physician/provider. We have found that certain health care needs can be provided without the need for a physical exam.  This service lets us provide the care you need with a short phone conversation.  If a prescription is necessary we can send it directly to your pharmacy.  If lab work is needed we can place an order for that and you can then stop by our lab to have the test done at a later time. Telephone visits are billed at different rates depending on your insurance coverage. During this emergency period, for some insurers they may be billed the same as an in-person visit.  Please reach out to your insurance provider with any questions.\"     Patient has given verbal consent to a Telephone visit? Yes     What phone number would you like to be contacted at? [phone]     Patient would like to receive their AVS by AVS Preference: Jeison.     Pain score: 5  What does your pain feel like: intermittent, sore, aching, deep  Does the pain interfere with:  Work: yes  Walking/distance: yes  Sleep: yes  Daily activities: yes  Relationships/social life: yes  Mood: yes  F= 8  "

## 2021-06-12 NOTE — PATIENT INSTRUCTIONS - HE
Continue Morphine extended release 15 mg every 8 hours.   Continue Morphine immediate release 15 mg up to 2-3 tablets per day.    Refills sent for 10/21/20 in order to allow for processing time at the pharmacy.  You have at least 10 days worth of medications remaining.  Check with your pharmacy that all prescriptions are on your profile. Call the pharmacy a week before you want to fill the prescription as stock may vary and the pharmacy may need to order the medication for you. I reserve the right to cancel the electronic prescription at the pharmacy in between appointments and would contact you with an explanation if this were to occur.  Please call our phone # [phone] and choose option #4 to request a medication refill seven days in advance for your second month opioid prescription refill to be sent electronically to your pharmacy.  We will not return a phone call when the refill is sent to your pharmacy, but expect you to communicate with your pharmacy to ensure it is received and ready by the date needed.  Continue use of medical cannabis as prescribed.     Continue PT as scheduled monthly  Continue with psychiatrist Dr. Ortiz and medication recommendations - follow-up next week  Continue trazodone with melatonin - lower dose of 25 mg take at bedtime  Follow-up in 8 weeks with Dolores delaney

## 2021-06-13 NOTE — PROGRESS NOTES
PAIN CLINIC PROGRESS NOTE    Subjective:   Keshia Arellano is a 57 y.o. female who presents for evaluation of low back pain.  She has history of lumbar arachnoiditis.  She has had numerous lumbar epidural injections in the past; worried to do repeat injections due to concern of worsening her arachnoiditis.  She has been educated on possible lumbar facet treatments or medial branch blocks to treat axial low back pain. She has other multisite pain including pelvic pain, cervicalgia, TMJ, myalgias.    Major issues:  1. Chronic pain syndrome    2. Arachnoiditis    3. Opioid Dependence With Continuous Use    4. Medical cannabis use    5. Cervical Disc Degeneration       Pain Score: 5/10 constant aching, pulling, gnawing pain in lower back, hip, shoulder, headache pain. Function rated 8. Last visit pain rated 5/10.  No changes in pain symptoms.   Radiation of pain: neck radiates to shoulders lower back pain with radiation to hip  Paresthesias, numbness, weakness: Denies any recent numbness in her upper extremities.    Gait disturbance: positive. Ambulating with cane, denies recent falls.  Exacerbating/relieving factors: Pain improves with rest, medications, heating pad, bath with epsom salt/aromatherapy, medical cannabis.  Has help with cleaning, laundry as these activities exacerbate pain.  Grocery shopping increases her pain but she does have a cart she can use at her apartment which helps.    Adverse effects of medications: constipation; using flax seed, dietary, senna 2-3/day.  Improved since taking less opioids daily.  Started supplement prune lax.  Using miralax frequently, not daily.  Some nausea and GERD.  Associated symptoms: Occipital headaches, spasms, weight gain, depression, sleep interruption due to pain, pelvic pain symptoms  Current treatment efficacy: Good.  Has been able to reduce dosing of morphine (see below).  Generally taking MSER 15 mg every 8-12 hours, MSIR 15 mg BID-TID prn.  Current treatment  compliance: Good.  Reducing opioid and adjunct medications.  Intermittent therapy with pelvic , continues with home therapy to help with pain flares.      She reports her nephew  4 weeks ago drowned while fishing. This month has been more activity for her and she hasn't been sleeping well.  She states travel to [place] and travel back for [place].  She states she is grieving appropriately, reading books.      She states she is wearing new orthotics now and very different than before; she states she is unsure how much this impacts her pain.  She is trying to wear every day to get used to them.    Not able to see acupuncturist due to family tragedy. She states trying to get out her massage contract as she state she got behind in visits due to fragrances in the office that she couldn't tolerate going.  She feels better a little while afterwards and helps certain areas relax. She feels it doesn't help enough as it doesn't get her fascia and adhesions and only gets surface pain.    She states she is taking less prescribed daily dose of morphine since medical cannabis.  She states now her mood has improved as she is in less pain has less depression and reports even her sister noticed and has made positive remarks about her mood. She has discontinued Naproxen and Baclofen for pain.  She is taking Morphine ER and IR 15 mg 2-3 times per day  She feels she cannot drive after using medical cannabis and be as present so uses later in the evening as she does feel sedated from it.  She was recertified by this provider in July as she had good benefit.       Started sleep supplement which she did not bring to visit today but recalls ingredients valerian root, B vitamins, and poppy seed extract.  1 capsule at night.    Review of Systems  A 12 point ROS was completed and was positive for headache, TMJ, pelvic pain, arthralgias, constipation, low back pain, weakness, depression with anxiety, sleep disturbance, visual  "disturbance requiring glasses, GERD.  Urinary urgency and sometimes urinary incontinence.   Otherwise negative.      Objective:     Vitals:    10/04/17 1444   BP: 112/75   Pulse: 82   Resp: 16   Weight: 214 lb (97.1 kg)   Height: 5' 6\" (1.676 m)   PainSc:   5     Physical Exam  General- patient is alert and oriented, in NAD, well-groomed, well-nourished.  Obese, appears stated age.  Presents alone today.    Psych- Judgment and insight normal, AOx4, recent and remote memory normal, mood and affect tearful, grieving, depressed  Respiratory- breathing is non-labored, regular rate and rhythm.  Cardiovascular- extremities warm and well perfused, no peripheral edema or varicosities  Dermatologic - Exposed skin is CDI  Musculoskeletal - Ambulating with single-point cane with slight antalgia on the right.  Able to sit to stand with difficulty.     *Opioid [place] Precautions:    UDS/Swab - collected 10/04/17 results pending  Opioid Consent - due  Opioid Agreement - 08/09/2017  Pharmacy- as documented    [place]  Reviewed - 10/04/17 as expected  Pill Count - 10/04/17  Psychological evaluation - outside source  MME - up to 90 but averaging around 60   Pharmacogenetic testing - yes    Imaging:  None new.    Assessment:   Keshia Arellano is a 57 y.o. female seen in clinic today for chronic intractable lower back pain secondary to arachnoiditis that affects her QOL and ADLs.  Symptoms are stable and improved with MSContin and MSIR. She started medical cannabis and has done opioid reduction, along with discontinuation of Naproxen, Baclofen, and Ambien. Reports improved sleep and mood and reduction in oral medications.  She has also started acupuncture for pain control and massage and continues HEP and use of orthotics.      Plan:   Continue Morphine extended release 15 mg 2-3 times a day.    Continue Morphine immediate release 15 mg up to 2-3 tablets per day.  Pill count shows MSER #61 and MSIR #53 from refill date 09/14/17  Next " refill is sent for on or after 10/21/17  Diagnostics: UDT/SWAB collected results are pending.  UDT/SWAB:  Patient required a random Urine Drug Testing, due to the need to comply with [place] Model Policy Guidelines and CDC Guideline for the use of any controlled substances. This is to ensure that patient is compliant with treatment, and monitor for risks such as diversion, abuse, or any other aberrant behaviors. Patient is either being considered for or taking a controlled substance. Unexpected findings will be discussed and treatment decision may be adjusted. Testing is being implemented across the board randomly w/o bias related to age, race, gender, socioeconomic status or Tenriism affiliation.   Continue use of new orthotics.  Discussed grief and if needed, schedule with Diana or Dhruv here for behavioral health services.    Follow up in 8 weeks with Dolores.    Dolores Bobby PA-C  [facility]  [address]  [place] [address]  Ph: [phone]  Fax: [phone]

## 2021-06-13 NOTE — TELEPHONE ENCOUNTER
Khushbu PALACIOS is ok.  I was going off of refill template and it was listed as 10/2019 so I didn't see it.  Thanks

## 2021-06-13 NOTE — TELEPHONE ENCOUNTER
Refill request: morphine ER and IR  Last ov with RW: 10/13  KAILASH due April 2020   UDT/CSA 10/2019   Follow up in 8 weeks  Next ov with RW: 1/05  Last filled on 10/21-28 days    Requested Prescriptions     Pending Prescriptions Disp Refills     morphine (MS CONTIN) 15 MG 12 hr tablet 84 tablet 0     Sig: Take 1 tablet (15 mg total) by mouth 3 (three) times a day.     morphine (MSIR) 15 MG tablet 84 tablet 0     Sig: Take 1 tablet (15 mg total) by mouth 3 (three) times a day as needed.     [facility]

## 2021-06-13 NOTE — TELEPHONE ENCOUNTER
It looks like this patient was already in for this on 10/16/2020 ??  Was the date for UDT/CSA to be ?

## 2021-06-13 NOTE — TELEPHONE ENCOUNTER
Refill request: morphine IR and morphine ER  No encounters of significance documented in the chart since template last completed.  Per the :  Morphine IR 15 mg and morphine ER 15 mg last filled on 11/18/20, #84, 28 days    Requested Prescriptions     Pending Prescriptions Disp Refills     morphine (MS CONTIN) 15 MG 12 hr tablet 84 tablet 0     Sig: Take 1 tablet (15 mg total) by mouth 3 (three) times a day.     morphine (MSIR) 15 MG tablet 84 tablet 0     Sig: Take 1 tablet (15 mg total) by mouth 3 (three) times a day as needed.     Signed Prescriptions Disp Refills     morphine (MS CONTIN) 15 MG 12 hr tablet 84 tablet 0     Sig: Take 1 tablet (15 mg total) by mouth 3 (three) times a day.     Authorizing Provider: JU BROWN     morphine (MSIR) 15 MG tablet 84 tablet 0     Sig: Take 1 tablet (15 mg total) by mouth 3 (three) times a day as needed.     Authorizing Provider: JU BROWN

## 2021-06-14 NOTE — PROGRESS NOTES
Keshia Arellano is a 59 y.o. female who is being evaluated via a billable telephone visit.      What phone number would you like to be contacted at? [phone]  Patient would like to receive their AVS by AVS Preference: Jeison.     Pain score: 5  What does your pain feel like: intermittent, sore, aching, deep  Does the pain interfere with:  Work: yes  Walking/distance: yes  Sleep: yes  Daily activities: yes  Relationships/social life: yes  Mood: yes  F= 8

## 2021-06-15 NOTE — PATIENT INSTRUCTIONS - HE
Continue Morphine extended release 15 mg every 8 hours.   Continue Morphine immediate release 15 mg up to 2-3 tablets per day.    Refills sent for 03/15/2021 for prior authorization process to start on 03/20/2021 & 04/17/2021  Check with your pharmacy that all prescriptions are on your profile. Call the pharmacy a week before you want to fill the prescription as stock may vary and the pharmacy may need to order the medication for you. I reserve the right to cancel the electronic prescription at the pharmacy in between appointments and would contact you with an explanation if this were to occur.  Continue use of medical cannabis as prescribed.     Continue PT as scheduled   Continue with psychiatrist Dr. Ortiz and medication recommendations.  Follow-up in 8 weeks with Dolores delaney

## 2021-06-15 NOTE — TELEPHONE ENCOUNTER
Prior Authorization Request  Who s requesting:  Dolores Bobby PA-C  Pharmacy Name and Location: [place], [place]  Medication Name: morphine (MS CONTIN) 15 MG 12 hr tablet  Insurance Plan: [employer]  Insurance Member ID Number:  [id number]  CoverMyMeds Key: n/a  Informed patient that prior authorizations can take up to 10 business days for response:   Yes  Okay to leave a detailed message: Yes

## 2021-06-16 NOTE — TELEPHONE ENCOUNTER
Central PA team  [phone]  Pool: HE PA MED ([id number])          PA has been initiated.       PA form completed and faxed insurance via Cover My Meds     Dangelo:  ETHAN CHI (Dangelo: [id number])      Medication:  morphine (MS CONTIN) 15 MG 12 hr tablet    Insurance:  [employer] - [employer]        Response will be received via fax and may take up to 5-10 business days depending on plan

## 2021-06-16 NOTE — TELEPHONE ENCOUNTER
Telephone Encounter by Viola Phipps RN at 3/28/2019 10:15 AM     Author: Viola Phipps RN Service: -- Author Type: Registered Nurse    Filed: 3/28/2019 10:26 AM Encounter Date: 3/28/2019 Status: Signed    : Viola Phipps RN (Registered Nurse)       Medication being requested: morphine 15 mg IR and morphine 15 mg 12 hr  Patient requesting to have typical quantity sent for refill as she was rescheduled due clinic scheduling need.  She reports it is difficulty to have to pay two co-pays when the rescheduling requirement was out of her hands.  Last visit date: 1/31 Provider: IRVING  4/2 with RW-rescheduled  Next visit date: 4/9.  Provider: IRVING  Expected follow up: 8 weeks  MTM visit (Pain Center) date: no  UDT date: 10/4/2018-consistent  Agreement date: 8/7/2018   snipped in:    Red Flags/Comments identified on call: no  Pertinent between visit information about requested medication (telephone, mychart, prior authorization):   My chart encounter with Dr. Andino on 2/7-disability paperwork(provider gave options to address this)  Script being sent to provider by nurse- dates and quantity: 4/3-84 tabs-cued as full quantity  Requested Prescriptions     Pending Prescriptions Disp Refills   ? morphine (MS CONTIN) 15 MG 12 hr tablet 84 tablet 0     Sig: Take 1 tablet (15 mg total) by mouth 3 (three) times a day.   ? morphine (MSIR) 15 MG tablet 84 tablet 0     Sig: Take 1 tablet (15 mg total) by mouth 3 (three) times a day as needed.     Pharmacy cued: [facility]   Standing orders for withdrawal protocol implemented: NA

## 2021-06-16 NOTE — TELEPHONE ENCOUNTER
Telephone Encounter by Rosaline Yan at 1/30/2020  2:00 PM     Author: Rosaline Yan Service: -- Author Type: --    Filed: 1/30/2020  2:00 PM Encounter Date: 1/30/2020 Status: Signed    : Rosaline Yan APPROVED:    Approval start date: 1/1/2020  Approval end date:  1/29/2021    Pharmacy has been notified of approval and will contact patient when medication is ready for pickup.

## 2021-06-16 NOTE — TELEPHONE ENCOUNTER
Telephone Encounter by Viola Phipps RN at 3/23/2020 10:16 AM     Author: Viola Phipps RN Service: -- Author Type: Registered Nurse    Filed: 3/23/2020 10:27 AM Encounter Date: 3/23/2020 Status: Signed    : Viola Phipps RN (Registered Nurse)       Medication being requested: morphine 15 mg 12 hr and morphine 15 mg  Last visit date: 2/25   Provider: IRVING  Next visit date: 4/21  Provider: IRVING  Expected follow up: 8 weeks  MTM visit (Pain Center) date: no  UDT/CSA 10/2019   snipped in:    Pertinent between visit information about requested medication (telephone, mychart, prior authorization, concerns, comments):   2/26-trazodone 50 mg PA  Script being sent to provider by nurse- dates and quantity:   Requested Prescriptions     Pending Prescriptions Disp Refills   ? morphine (MS CONTIN) 15 MG 12 hr tablet 84 tablet 0     Sig: Take 1 tablet (15 mg total) by mouth 3 (three) times a day.   ? morphine (MSIR) 15 MG tablet 84 tablet 0     Sig: Take 1 tablet (15 mg total) by mouth 3 (three) times a day as needed.     Pharmacy cued: [facility]  Standing orders for withdrawal protocol implemented: NA

## 2021-06-16 NOTE — TELEPHONE ENCOUNTER
Telephone Encounter by Rosaline Yan at 1/7/2019 10:28 AM     Author: Rosaline Yan Service: -- Author Type: --    Filed: 1/7/2019 10:29 AM Encounter Date: 1/7/2019 Status: Signed    : Rosaline Yan       PA initiated through [employer] faxed manually to [phone] this was marked as urgent

## 2021-06-16 NOTE — TELEPHONE ENCOUNTER
Telephone Encounter by Viola Phipps RN at 12/12/2019 11:53 AM     Author: Viola Phipps RN Service: -- Author Type: Registered Nurse    Filed: 12/12/2019 11:57 AM Encounter Date: 12/12/2019 Status: Signed    : Viola Phipps RN (Registered Nurse)       Medication being requested: Morphine 15 mg ER and morphine 15 mg IR  Last visit date: 10/29  Provider: IRVING  Next visit date: 12/26  Provider: IRVING  Expected follow up: 8 weeks  MTM visit (Pain Center) date: no  UDS/CSA 10/2019   snipped in:    Pertinent between visit information about requested medication (telephone, mychart, prior authorization, concerns, comments): NA  Script being sent to provider by nurse- dates and quantity:   Requested Prescriptions     Pending Prescriptions Disp Refills   ? morphine (MSIR) 15 MG tablet 84 tablet 0     Sig: Take 1 tablet (15 mg total) by mouth 3 (three) times a day as needed.   ? morphine (MS CONTIN) 15 MG 12 hr tablet 84 tablet 0     Sig: Take 1 tablet (15 mg total) by mouth 3 (three) times a day.     Pharmacy cued: [facility]  Standing orders for withdrawal protocol implemented: NA

## 2021-06-16 NOTE — TELEPHONE ENCOUNTER
Telephone Encounter by Zhanna Hernandez at 2/26/2020 11:25 AM     Author: Zhanna Hernandez Service: -- Author Type: --    Filed: 2/26/2020 11:26 AM Encounter Date: 2/26/2020 Status: Signed    : Zhanna Hernandez       PA is not needed. Medication is covered.

## 2021-06-16 NOTE — TELEPHONE ENCOUNTER
Telephone Encounter by Zhanna Hernandez at 1/8/2019 11:19 AM     Author: Zhanna Hernandez Service: -- Author Type: --    Filed: 1/8/2019 11:20 AM Encounter Date: 1/7/2019 Status: Signed    : Zhanna Hernandez       PA is not needed for this medication.  Called pharmacy and they are able to get a paid claim.

## 2021-06-16 NOTE — TELEPHONE ENCOUNTER
Telephone Encounter by Terra Givens RN at 2/10/2020 10:05 AM     Author: Terra Givens RN Service: -- Author Type: Registered Nurse    Filed: 2/10/2020 10:20 AM Encounter Date: 2/10/2020 Status: Signed    : Terra Givens RN (Registered Nurse)       Medication being requested: Morphine IR, AND ER.  Last visit date:12/26  Provider: patti  Next visit date: 2/25.  Provider: patti  Expected follow up: 8 weeks  MTM visit (Pain Center) date: no  UDT date: 10/2019  Agreement date: 10/2019   snipped in:    Pertinent between visit information about requested medication (telephone, mychart, prior authorization, concerns, comments): no  Script being sent to provider by nurse- dates and quantity:   Requested Prescriptions     Pending Prescriptions Disp Refills   ? morphine (MS CONTIN) 15 MG 12 hr tablet 27 tablet 0     Sig: Take 1 tablet (15 mg total) by mouth 3 (three) times a day for 9 days.   ? morphine (MSIR) 15 MG tablet 27 tablet 0     Sig: Take 1 tablet (15 mg total) by mouth 3 (three) times a day as needed.     Pharmacy cued: [facility]  Standing orders for withdrawal protocol implemented: cinthya       
[Negative] : Neurological

## 2021-06-16 NOTE — TELEPHONE ENCOUNTER
Telephone Encounter by Viola Phipps RN at 10/14/2019  9:43 AM     Author: Viola Phipps RN Service: -- Author Type: Registered Nurse    Filed: 10/14/2019  9:48 AM Encounter Date: 10/14/2019 Status: Signed    : Viola Phipps RN (Registered Nurse)       Medication being requested: morphine 15 mg 12 hr, morphine 15 mg  Last visit date: 8/29.  Provider: IRVING  Next visit date: 10/30.  Provider: IRVING  Expected follow up: 8 weeks  MTM visit (Pain Center) date: no  UDS - 10/04/18 CSA - 08/07/18   snipped in:    Pertinent between visit information about requested medication (telephone, mychart, prior authorization, concerns, comments):   NA  Script being sent to provider by nurse- dates and quantity:   Requested Prescriptions     Pending Prescriptions Disp Refills   ? morphine (MSIR) 15 MG tablet 84 tablet 0     Sig: Take 1 tablet (15 mg total) by mouth 3 (three) times a day as needed.   ? morphine (MS CONTIN) 15 MG 12 hr tablet 84 tablet 0     Sig: Take 1 tablet (15 mg total) by mouth 3 (three) times a day.     Pharmacy cued: [facility]  Standing orders for withdrawal protocol implemented: NA

## 2021-06-16 NOTE — TELEPHONE ENCOUNTER
"Telephone Encounter by Janie Aleman RN at 8/15/2019  4:07 PM     Author: Janie Aleman RN Service: -- Author Type: Registered Nurse    Filed: 8/15/2019  4:16 PM Encounter Date: 8/15/2019 Status: Signed    : Janie Aleman RN (Registered Nurse)       Medication being requested: MS  IR and ER  Last visit date: 6/4   Provider: IRVING  Next visit date: 8/30   Provider: IRVING  Expected follow up: 6 weeks  MTM visit ([facility] Center) date: na  UDT date:  10/4/2018  Agreement date: 8/7/18   snipped in:      Pertinent between visit information about requested medication (telephone, mychart, prior authorization, concerns, comments): Patient left message to apologize for missing her last appointment. \"She wasn't feeling well and went to an urgent care, she was in a-fib and they kept her overnight for observation\".  Script being sent to provider by nurse- dates and quantity:   Requested Prescriptions     Pending Prescriptions Disp Refills   ? morphine (MS CONTIN) 15 MG 12 hr tablet 84 tablet 0     Sig: Take 1 tablet (15 mg total) by mouth 3 (three) times a day.   ? morphine (MSIR) 15 MG tablet 84 tablet 0     Sig: Take 1 tablet (15 mg total) by mouth 3 (three) times a day as needed.     [facility]: [facility]  Standing orders for withdrawal protocol implemented: na       "

## 2021-06-17 NOTE — TELEPHONE ENCOUNTER
Telephone Encounter by Viola Phipps RN at 5/18/2020 10:10 AM     Author: Viola Phipps RN Service: -- Author Type: Registered Nurse    Filed: 5/18/2020 10:27 AM Encounter Date: 5/18/2020 Status: Signed    : Viola Phipps RN (Registered Nurse)       Medication being requested: morphine 15 mg and morphine 15 mg 12 hr  Last visit date: 4/21  Provider: Rachel  Next visit date: 6/16  Provider: Rachel  Expected follow up: 8 weeks  MTM visit ([facility] Center) date: no  UDT/CSA 10/2019   snipped in:    Pertinent between visit information about requested medication (telephone, mychart, prior authorization, concerns, comments):NA  Script being sent to provider by nurse- dates and quantity:   Requested Prescriptions     Pending Prescriptions Disp Refills   ? morphine (MS CONTIN) 15 MG 12 hr tablet 84 tablet 0     Sig: Take 1 tablet (15 mg total) by mouth 3 (three) times a day.   ? morphine (MSIR) 15 MG tablet 56 tablet 0     Sig: Take 1 tablet (15 mg total) by mouth 3 (three) times a day as needed.     Pharmacy cued: [facility]  Standing orders for withdrawal protocol implemented: NA

## 2021-06-17 NOTE — PATIENT INSTRUCTIONS - HE
Continue Morphine extended release 15 mg every 8 hours. PA approved until 03/17/22  Continue Morphine immediate release 15 mg up to 2-3 tablets per day.    Refills sent for 05/14/21 (to start 05/16) & 06/11/21 (to start 06/13/21 of after) to avoid refills on a weekend  Check with your pharmacy that all prescriptions are on your profile. Call the pharmacy a week before you want to fill the prescription as stock may vary and the pharmacy may need to order the medication for you. I reserve the right to cancel the electronic prescription at the pharmacy in between appointments and would contact you with an explanation if this were to occur.  Continue use of medical cannabis as prescribed.     Continue PT as scheduled   Continue with psychiatrist Dr. Ortiz and medication recommendations.  Can also consider scheduling a phone visit with Karson Dee PharmD for review of antidepressants and pharmacogenetic studies.  Follow-up in 8 weeks with Dolores in clinic

## 2021-06-17 NOTE — TELEPHONE ENCOUNTER
Telephone Encounter by Barbara Morgan RN at 5/20/2020  8:51 AM     Author: Barbara Morgan RN Service: -- Author Type: Registered Nurse    Filed: 5/20/2020  9:17 AM Encounter Date: 5/19/2020 Status: Signed    : Barbara Morgan RN (Registered Nurse)       Medication being requested: MSIR 15  Last visit date: 4/21.  Provider: IRVING  Next visit date: 6/16.  Provider: IRVING  Expected follow up: 8  MTM visit ([facility] Center) date: no  UDT date: 10/19  Agreement date: 10/19   snipped in:    Pertinent between visit information about requested medication (telephone, mychart, prior authorization, concerns, comments): review of note dated 4/21: refill for amount subtracting pill count at visit, thus making the refill quantity 56 and MSIR at 3xd PRN is 84   Script being sent to provider by nurse- dates and quantity: qu: MSIR changed to 84 MSER unchanged due date 5/22 for 28 day supply  Requested Prescriptions     Pending Prescriptions Disp Refills   ? morphine (MS CONTIN) 15 MG 12 hr tablet 84 tablet 0     Sig: Take 1 tablet (15 mg total) by mouth 3 (three) times a day.   ? morphine (MSIR) 15 MG tablet 84 tablet 0     Sig: Take 1 tablet (15 mg total) by mouth 3 (three) times a day as needed.       Pharmacy cued: [facility]  Standing orders for withdrawal protocol implemented: n/a

## 2021-06-17 NOTE — TELEPHONE ENCOUNTER
Telephone Encounter by Virgen Gillespie RN at 7/13/2020  3:00 PM     Author: Virgen Gillespie RN Service: -- Author Type: Registered Nurse    Filed: 7/13/2020  3:08 PM Encounter Date: 7/13/2020 Status: Signed    : Virgen Gillespie RN (Registered Nurse)       Medication being requested: Morphine 15mg and MS Contin 15mg  Last visit date: 6/16/20.  Provider: IRVING  Next visit date: 8/11/20.  Provider: IRVING  Expected follow up: 8 weeks  MTM visit ([facility] Center) date: No  UDT date: 10/2019  Agreement date: 10/2019   snipped in:    Pertinent between visit information about requested medication (telephone, mychart, prior authorization, concerns, comments): No  Script being sent to provider by nurse- dates and quantity:   7/20/20-8/17/20  Requested Prescriptions     Pending Prescriptions Disp Refills   ? morphine (MS CONTIN) 15 MG 12 hr tablet 84 tablet 0     Sig: Take 1 tablet (15 mg total) by mouth 3 (three) times a day.   ? morphine (MSIR) 15 MG tablet 84 tablet 0     Sig: Take 1 tablet (15 mg total) by mouth 3 (three) times a day as needed.     [facility] cued: [facility]  Standing orders for withdrawal protocol implemented: [facility]

## 2021-06-17 NOTE — TELEPHONE ENCOUNTER
Telephone Encounter by Virgen Gillespie RN at 11/12/2020  2:15 PM     Author: Virgen Gillespie RN Service: -- Author Type: Registered Nurse    Filed: 11/12/2020  2:16 PM Encounter Date: 11/12/2020 Status: Signed    : Virgen Gillespie RN (Registered Nurse)       A UDT was done on 10/16/20.  Results below.

## 2021-06-17 NOTE — TELEPHONE ENCOUNTER
Telephone Encounter by Thalia Quick at 3/17/2021  3:21 PM     Author: Thalia Quick Service: -- Author Type: Patient Access    Filed: 3/17/2021  3:33 PM Encounter Date: 3/16/2021 Status: Signed    : Thalia Quick (Patient Access)       PA APPROVED:    Approval start date: 02/15/21  Approval end date:  03/17/22    Pharmacy has been notified of approval and will contact patient when medication is ready for pickup for tomorrow as instructed on the order.

## 2021-06-17 NOTE — PROGRESS NOTES
Keshia Arellano is a 59 y.o. female who is being evaluated via a billable telephone visit.       What phone number would you like to be contacted at? [phone]  Patient would like to receive their AVS by AVS Preference: Jeison.     Pain score: 4  What does your pain feel like: constant, sore, aching, deep  Does the pain interfere with:  Work: yes  Walking/distance: yes  Sleep: yes  Daily activities: yes  Relationships/social life: yes  Mood: yes  F= 8

## 2021-06-18 NOTE — PROGRESS NOTES
MT New Encounter    Assessment/Plan     1. Encounter for pharmacogenetic testing  Please see scanned report for full details. Pertinent findings:   -2D6 intermediate metabolizer  -VKORC1 high sensitivity - would recommend reduced starting warfarin doses if needed in the future  -2B6 poor metabolizer. From a pain perspective, May have boosted doses of pain medications such as codeine, hydrocodone, or tramadol  -OPRM high sensitivity  -2C19 rapid metabolism status not available to add to problem list, therefore, added to clopidogrel to allergy list. Would effect antiplatelet medication (clopidogrel) if she were to need emergently in the future. Would not preclude use, but may increase risk for bleeding.   -CPY1A2 hyperinducer: Expect higher than average doses of melatonin, R-warfarin (less active enantiomer), rozerem, duloxetine, amitriptyline if considered in the future    Medication considerations:   -She likely is having boosted response to her sertraline based on 2B6 poor metabolism.  Sertraline also affects the 2C19, 2D6, 2C9, and 3A4 pathways.  Reasonable to continue current dose as she is adequately tolerating.   -Patient is concerned about use of ketamine due 2B6 poor metabolizer status.  Ketamine also is extensively metabolized by 2C9 and 3A4 pathways.  We could consider ketamine if needed for pain modulation in the future.  I would recommend starting with a very low dose and slow titration if used.  -Encouraged to share results with Eloy    2. Takes dietary supplements  No drug interactions identified. Will research to determine if any of her reported supplements are effected by her genetic testing results.     3. Chronic Pain Syndrome  Stable. OPRM high sensitivity - expect appropriate responsiveness to her MS Contin.    Follow Up  6 months or sooner, if needed          Subjective/Objective   Keshia Arellano is a 57 y.o. female coming in for a follow up visit for Medication Therapy Management. She was  "referred to me from Dolores Bobby PA-C    Chief Complaint: Discussing pharmacogenomic testing results    Patient recently had pharmacogenetics testing completed through [facility].  She has had a testing completed in the past, but recently we re-did it.  She describes a history of poor tolerability to several medications. She is very concerned about 2D6 drugs. States that testing wanted to be completed to ensure that her opioid medications were safe to use.    For pain, patient is currently taking MS Contin 15 mg 3 times daily, and MSIR 15 mg 3 times daily.  She has found that it is somewhat helpful for her pain.  Dose was recently increased due to high cost of her medical cannabis.  She is currently using a Dilma vaporizer and recently picked up a Dilma topical formulation which she finds both to be helpful.  Additionally, she takes a tangerine vaporizer, which is more effective for her pain, but also causes moderate feelings of being high.  She uses as needed MiraLAX for constipation associated with her opioids    She takes several different supplements including calcium glycerophosphate 1 tablet daily as needed, vitamin D 10,000 units daily, L-glutamine 2-4 tablets every morning for \"alertness\", DEB 1 tablet at bedtime, and magnesium glycine 8 1 tablet daily.    She follows with a psychiatrist.  Is currently on sertraline 150 mg daily, and trazodone 150 mg at bedtime.  She feels that it is helpful for her depression.    Pain location: Low back, legs, pelvis, feet, left upper body, and mid back.  She rates her pain today and as a 6 out of 10.  Pain description: Sore, penetrating, deep, gnawing, and constant.  Aggravating factors: Activity, sitting, standing, walking  Alleviating factors: Rest, heat, medications  No in injuries since previous visit.  No change to pain intensity since previous visit.  Associated symptoms: Occasional numbness around her left hip and gluteus israel.  Weakness in her legs, " "pelvis, and hips.  She does describe occasional urinary incontinence.  Pain is worse at night.  Functional symptoms: Affecting sleep, walking, work, activities of daily living, relationships, and sexual health  He does not feel that her pain medication has improved her overall function  Previously tried pain medications and reactions:    Tylox, tramadol, oxycodone, gabapentin (and nothing), Vicodin, OxyContin    PMH: reviewed in EPIC   Allergies/ADRs: reviewed in EPIC   Alcohol: reviewed in EPIC   Tobacco:   History   Smoking Status     Former Smoker   Smokeless Tobacco     Never Used     Today's Vitals:   Vitals:    05/14/18 1453   BP: (!) 149/91   Patient Site: Right Arm   Patient Position: Sitting   Cuff Size: Adult Regular   Pulse: 67   Resp: 16   Weight: 186 lb (84.4 kg)   Height: 5' 6\" (1.676 m)     ----------------    Much or all of the text in this note was generated through the use of Dragon Dictate voice-to-text software. Errors in spelling or words which seem out of context are unintentional. Sound alike errors, in particular, may have escaped editing.    The patient was given a summary of these recommendations as an after visit summary    I spent 60 minutes with this patient today; an extra 15 minutes was spent creating the Medication Action Plan. All changes were made via collaborative practice agreement with Dolores Bobby PA-C. A copy of the visit note was provided to the patient's provider.     Rola Goff, PharmD, BCACP  MTM Pharmacist   [facility]         Current Outpatient Prescriptions   Medication Sig Dispense Refill     CALCIUM GLYCEROPHOSPHATE (PRELIEF ORAL) Take 1 tablet by mouth daily as needed.       cholecalciferol, vitamin D3, 10,000 unit capsule Take 10,000 Units by mouth daily.       glutamine (L-GLUTAMINE) 500 mg cap Take 2-4 tablets by mouth every morning.       melatonin 5 mg Subl Place 5 mg under the tongue at bedtime.       morphine (MS CONTIN) " 15 MG 12 hr tablet Take 1 tablet (15 mg total) by mouth 3 (three) times a day. 84 tablet 0     morphine (MSIR) 15 MG tablet Take 1 tablet (15 mg total) by mouth 3 (three) times a day as needed. 84 tablet 0     NON FORMULARY Inhale. Take 2-6 puffs, 1-5 times daily. THC dominant       NON FORMULARY Inhale. Take 1-5 puffs, 1-5 times daily. For CBD/THC equal amounts.       sertraline (ZOLOFT) 100 MG tablet Take 150 mg by mouth every morning.        traZODone (DESYREL) 50 MG tablet 150 mg at bedtime. Take 150 mg Daily at bedtime       No current facility-administered medications for this encounter.

## 2021-06-22 NOTE — PROGRESS NOTES
PAIN CLINIC PROGRESS NOTE    Subjective:   Keshia Arellano is a 58 y.o. female who presents for evaluation of low back pain.  She has history of lumbar arachnoiditis.  She has had numerous lumbar epidural injections in the past; worried to do repeat injections due to concern of worsening her arachnoiditis.  She has been educated on possible lumbar facet treatments or medial branch blocks to treat axial low back pain. She has other multisite pain including pelvic pain, cervicalgia, TMJ, myalgias.    Major issues:  1. Chronic pain syndrome    2. Arachnoiditis    3. Chronic, continuous use of opioids    4. Medical cannabis use       Pain Score: 6/10 constant deep, aching, sore, heavy, dull pain in low back and  pelvis, ribs, legs, neck.  Function rated 7.    Gait disturbance: positive. Ambulating with cane, denies recent falls.  Exacerbating factors: Activity  Alleviating factors: low activity, rest, medications, medical cannabis.  Adverse effects of medications:  Sleepiness, altered state with medical marijuana, constipation.   Associated symptoms: Reports numbness in left fingers (4th/5th), weakness in left hip, pain at night. Denies bowel bladder incontinence (has urinary urgency), unexplained weight loss, fever/chills.   Functional symptoms: Pain interferes with sleep, walking, work, activities of daily living, relationships/social, sexual health, mood (depressed, frustrated).  States function has not improved with current pain treatment.  Current treatment efficacy: Fair.  Taking MSER 15 mg TID, MSIR 15 mg TID prn.  Takes both in the morning and then spaces out doses by 4 hours. States on occasion taking MSER 30 mg dose in the morning if she wakes to take one and then repeats dose within 1-2 hours feels better pain control that way.   Current treatment compliance: Good.  Reducing opioid and adjunct medications.  Intermittent therapy with pelvic , continues with home therapy to help with pain  "flares.     Since last visit the patient denies any chance of being pregnant, any new diagnostic testing, any visits to the ER/urgent care.  She reports she has participated in PT and saw pharmacist at [facility].  She had symptoms of cough 1 week ago, affecting rib pain.  Thinks from chemical exposure shopping for groceries and not taking OTC meds for cough.  She has not seen PCP.      Started Serrapeptase enzyme 2 per day since 10/10/18.  She has noticed that during self massage adhesions feel less.  More muscular relief than lower back pain.  She is not as congested and less mucus with fragrance exposure.    She states her medical cannabis is \"finally figured out.\"  She states that the terpines and other components are impacting her results.  She states she buys 1-2 cartridges at a time to use and make sure it is useful. She states if she runs out of tangerine she will ask for 1 at a time as their stock goes so fast.  She states one lot did \"nothing\" for her pain (lot 31751-6) and still has a whole one left which she cannot return.  She tried up to 10 puffs of this and didn't have any pain reduction or side effects. Ones that work for her pain the more side effects she has including sleepiness, altered state.  She is still using sera and topical.  Hasn't started ketamine 20 mg dose.      She states she likes the oxytocin, as she \"feels more herself.\"  She finds it difficult to dose three times a day due to keeping refrigerated.  She states missing a dose intermittently.  Better than any antidepressant she has tried.  Trying to wean down Zoloft due to side effects.  She is taking 40 mg twice a day consistently, has a difficult time remembering the third dose.  Wasn't sure how much it was helpful for pain and stopped taking 3 days ago but has noticed she has a mood benefit from it.  She feels \"healthier\" for antidepressant than others.  She has reduced zoloft to 25 mg.     Continues dry needling with PT is " "helpful; can only do a little spot at a time.  Goes every 2 weeks.  She did have lateral thighs treated last time.  Does manual therapy in rib cage last time.  She states she is doing her stretching on exercise ball which popped her upper back.      Review of Systems  A 12 point ROS was completed and was positive for headache, TMJ, pelvic pain, arthralgias, constipation, low back pain, weakness, depression with anxiety, sleep disturbance, visual disturbance requiring glasses, GERD, weight loss.  Urinary urgency and sometimes urinary incontinence.   Otherwise negative.      Objective:     Vitals:    12/04/18 1508   BP: 147/78   Pulse: 82   Weight: 196 lb (88.9 kg)   Height: 5' 6\" (1.676 m)   PainSc:   6   PainLoc: Back     Physical Exam  General- patient is alert and oriented, in NAD, well-groomed, well-nourished. Obese, appears stated age.  Presents alone today.    Psych- Judgment and insight normal, AOx4, recent and remote memory normal, mood and affect concerned, depressed.    Respiratory- breathing is non-labored, regular rate and rhythm.  Cardiovascular- extremities warm and well perfused, no peripheral edema or varicosities  Dermatologic - Exposed skin is CDI  Musculoskeletal - Ambulating with single-point cane with slight antalgia on the right.  Able to sit to stand with difficulty.     *Opioid [place] Precautions:    UDS/Swab - Reviewed from 10/04/18, results as expected  Opioid Consent - 08/07/18  Opioid Agreement - 08/07/18  Pharmacy- as documented    [place]  Reviewed - 12/04/18 as expected  Pill Count - 12/04/18 see CMA note    Psychological evaluation - outside source  MME - 90  Pharmacogenetic testing - yes  KAILASH 10/04/18    Imaging:  None new.    Assessment:   Keshia Arellano is a 58 y.o. female seen in clinic today for chronic intractable lower back pain secondary to arachnoiditis that affects her QOL and ADLs.  Symptoms are improved with MSContin and MSIR. She continues medical cannabis and has done " opioid reduction to be within CDC guidelines, along with discontinuation of Naproxen, Baclofen, and Ambien.  She does report intermittent spasms and discuss management of this symptom including prn muscle relaxants, TPI, Hwave, dry needling with PT, stretching, etc.  She is to continue magnesium for spasms as she has failed multiple oral muscle relaxants.  Will need to check labs to make sure her levels do not become supratherapeutic.  She is advised to try increase doses of ketamine 20 mg for pain.      Plan:   Continue Morphine extended release 15 mg every 8 hours.  If you want to take a 30 mg dose in the morning, you may and then space the remaining dose out to 12 hours to not exceed 3 doses in 24 hours.  Continue Morphine immediate release 15 mg up to 2-3 tablets per day.  As your opioid dose remains stable, I will send electronic refills to the pharmacy for 2 subsequent months until your next appointment so you do not have to call our refill line.  The fill dates for your medications are:  12/11/18 for use on 12/12/18 & 01/8/19 for use on 01/09/19  Check with your pharmacy that all prescriptions are on your profile. Call the pharmacy a week before you want to fill the prescription as stock may vary and the pharmacy may need to order the medication for you. I reserve the right to cancel the electronic prescription at the pharmacy in between appointments and would contact you with an explanation if this were to occur.  Continue use of medical cannabis as prescribed   Hold on oxytocin 40 units 3 times a day for chronic pain to evaluate if it is helping.  If pain or mood worsens in the next couple of weeks, we can reorder to pharmacy.  You may trial ketamine 40 mg dose as needed for severe pain - RX not filled yet.  Magnesium twice a day and will monitor magnesium levels.   Will need to establish care with new PM&R physician; can look at new insurance coverage for network providers in January. Discussed Allan  Thu, DO at [facility] does osteopathic manipulations if you would like a consult with him in the future.  Continue PT with Dilma   Follow-up in 8 weeks with Dolores Bobby PA-C  [facility]  [address] [place] [address]. [address]  [place] [address]  Ph: [phone]  Fax: [phone]

## 2021-06-22 NOTE — TELEPHONE ENCOUNTER
Prior Authorization Request  Who s requesting:  Dolores Bobby PA-C/ Hilaria Dodge CMA  Pharmacy Name and Location: [facility] [place]  Medication Name: Morphine 15MG IM Tabs  Insurance Plan: Medicare  Insurance Member ID Number:  [id number]  Informed patient that prior authorizations can take up to 10 business days for response:   Yes; 72 hours  Okay to leave a detailed message: Yes

## 2021-06-22 NOTE — PROGRESS NOTES
Pt is being seen today by JASON Reyes, for refills and f/u of 6- constant, deep, aching back and multisite pain. F-7. Pill count morphine ER 15mg #46, morphine IR 15mg #26.

## 2021-06-22 NOTE — TELEPHONE ENCOUNTER
Patient's original message/refill request:Oxytocin    Last appointment:12/04  Next appointment:01/31  Oxytocin last filled: 10/05    Notes/Comments:  Per d/c summary of 12/04:  Hold on oxytocin 40 units 3 times a day for chronic pain to evaluate if it is helping.  If pain or mood worsens in the next couple of weeks, we can reorder to pharmacy.   Left  msg for pt to call back and leave a msg indicating if it is helping her pain so it can be documented.    .   Med queued up and sent to Dolores

## 2021-06-22 NOTE — TELEPHONE ENCOUNTER
Prior Authorization Request  Who s requesting:  Dolores Bobby PA-C/ Hilaria Dodge CMA  Pharmacy Name and Location: [facility] [place]  Medication Name: Morphine 15MG ER Tabs  Insurance Plan: Medicare  Insurance Member ID Number:  [id number]  Informed patient that prior authorizations can take up to 10 business days for response:   Yes; 72 hours  Okay to leave a detailed message: Yes

## 2021-06-23 NOTE — PATIENT INSTRUCTIONS - HE
Continue Morphine extended release 15 mg every 8 hours.  If you want to take a 30 mg dose in the morning, you may and then space the remaining dose out to 12 hours to not exceed 3 doses in 24 hours.  Continue Morphine immediate release 15 mg up to 2-3 tablets per day.  As your opioid dose remains stable, I will send electronic refills to the pharmacy for 2 subsequent months until your next appointment so you do not have to call our refill line.  The fill dates for your medications are:  02/06/19 & 03/06/19  Check with your pharmacy that all prescriptions are on your profile. Call the pharmacy a week before you want to fill the prescription as stock may vary and the pharmacy may need to order the medication for you. I reserve the right to cancel the electronic prescription at the pharmacy in between appointments and would contact you with an explanation if this were to occur.  Continue use of medical cannabis as prescribed   Continue oxytocin 40 units 3 times a day for chronic pain to evaluate if it is helping. New order for 30 days sent to [facility].  If it would be cost effective to send in 90 day supply I can do that too (ask if compound would be okay that long)  Will need to establish care with new PM&R physician; Discussed Allan Andino, DO at [facility] does osteopathic manipulations if you would like a consult with him in the future I will send a staff message first to see if he is the right provider for your disability needs.  Continue PT/dry needling with Dilma as scheduled  Follow-up in 8 weeks with Dolores BOLTON
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
## 2021-06-25 NOTE — PROGRESS NOTES
Progress Notes by Dolores Bobby PA-C at 8/9/2017  3:10 PM     Author: Dolores Bobby PA-C Service: -- Author Type: Physician Assistant    Filed: 8/9/2017  4:52 PM Date of Service: 8/9/2017  3:10 PM Status: Signed    : Dolores Bobby PA-C (Physician Assistant)       PAIN CLINIC PROGRESS NOTE    Subjective:   Keshia Arellano is a 56 y.o. female who presents for evaluation of low back pain.  She has history of lumbar arachnoiditis.  She has had numerous lumbar epidural injections in the past; worried to do repeat injections due to concern of worsening her arachnoiditis.  She has been educated on possible lumbar facet treatments or medial branch blocks to treat axial low back pain. She has other multisite pain including pelvic pain, cervicalgia, TMJ, myalgias.    Major issues:  1. Chronic pain syndrome    2. Arachnoiditis    3. Arthralgias In Multiple Sites    4. Opioid Dependence With Continuous Use    5. Medical cannabis use       Pain Score: 5/10 constant dull, deep aching, sore pain in lower back, hip, shoulder, headache pain. Function rated 8. Last visit pain rated 7/10.  No changes in pain symptoms.   Radiation of pain: neck radiates to shoulders lower back pain with radiation to hip  Paresthesias, numbness, weakness: Denies any recent numbness in her upper extremities.    Gait disturbance: positive. Ambulating with cane, denies recent falls.  Exacerbating/relieving factors: Pain improves with rest, medications, heating pad, bath with epsom salt/aromatherapy, medical cannabis.  Has help with cleaning, laundry as these activities exacerbate pain.  Grocery shopping increases her pain but she does have a cart she can use at her apartment which helps.    Adverse effects of medications: constipation; using flax seed, dietary, senna 2-3/day.  Improved since taking less opioids daily.  Started supplement prune lax.  Using miralax frequently, not daily.  Some nausea and GERD.  Associated symptoms:  "Occipital headaches, spasms, weight gain, depression, sleep interruption due to pain, pelvic pain symptoms  Current treatment efficacy: Good.  Has been able to reduce dosing of morphine (see below).   Current treatment compliance: Good.  Reducing opioid and adjunct medications.  Intermittent therapy with pelvic , continues with home therapy to help with pain flares.      Since last visit she did see Dr. Gurrola for disability renewal assessment.  Review of note from 07/28/17:  \"  Going next week for new orthotics.  She states current pair are several years old.  Wears daily.  She states she hasn't been to [place] [facility] since this past September.  She started acupuncture and has had 3 sessions weekly.  She also had cupping and felt relief right away and last time repeated cupping treatment.  She was told to come more often but she feels she cannot get out that many times in the week as it takes her great effort to shower/bathe, drive to appointments, etc.  They are working on scar tissue reduction.      For function, she reports bathing as difficult. She tries to avoid it unless leaving the house.  She does have a shower chair.  She has .  She has  for different shakes for breakfast and is trying to get more protein for energy and nutrition.  She has prepared meals otherwise as cooking and doing dishes cause more pain.      Ambien 10 mg discontinued 2-3 weeks ago and sleeping better not waking up as much and getting to sleep sooner and getting up earlier and having more day, some days feels more rested when waking. Trazodone continues 150 mg.      She states she is taking less prescribed daily dose of morphine since medical cannabis.  She states now her mood has improved as she is in less pain has less depression and reports even her sister noticed and has made positive remarks about her mood. She has discontinued Naproxen and Baclofen for pain.  She is taking Morphine ER and IR 15 mg " "2-3 times per day  She feels she cannot drive after using medical cannabis and be as present so uses later in the evening as she does feel sedated from it.  She was recertified by this provider in July as she had good benefit.  She was last seen at dispensary on 07/26/17 reviewed today:    She states the visit prior to this she was having clumping in her product and she spoke to pharmacist as it was harder to inhale and there was crystallization in batches.  These were replaced and 20% off next order.      Review of Systems  A 12 point ROS was completed and was positive for headache, TMJ, pelvic pain, arthralgias, constipation, low back pain, weakness, depression with anxiety, sleep disturbance, visual disturbance requiring glasses, GERD.  Urinary urgency and sometimes urinary incontinence.   Otherwise negative.      Objective:     Vitals:    08/09/17 1519   BP: 126/75   Pulse: 75   Resp: 16   Weight: 215 lb (97.5 kg)   Height: 5' 6\" (1.676 m)   PainSc:   5     Physical Exam  General- patient is alert and oriented, in NAD, well-groomed, well-nourished.  Obese, appears stated age.  Presents alone today.    Psych- Judgment and insight normal, AOx4, recent and remote memory normal, mood and affect concerned but appropriate.  Respiratory- breathing is non-labored, regular rate and rhythm.  Cardiovascular- extremities warm and well perfused, no peripheral edema or varicosities  Dermatologic - Exposed skin is CDI  Musculoskeletal - Ambulating with single-point cane with slight antalgia on the right.  Able to sit to stand with difficulty.     *Opioid [place] Precautions:    UDS/Swab - 10/10/16 appropriate  Opioid Consent - due  Opioid Agreement - 08/10/2016  Pharmacy- as documented    [place]  Reviewed - 08/09/17 as expected  Pill Count - 08/09/17, MSER 15 mg #23 MSIR 15 mg #16  Psychological evaluation - outside source  MME - up to 90 but averaging around 60   Pharmacogenetic testing - yes    Imaging:  None " new.    Assessment:   Keshia Arellano is a 56 y.o. female seen in clinic today for chronic intractable lower back pain secondary to arachnoiditis that affects her QOL and ADLs.  Symptoms are stable and improved with MSContin and MSIR. She started medical cannabis and has done opioid reduction, along with discontinuation of Naproxen, Baclofen, and Ambien. Reports improved sleep and mood and reduction in oral medications.  She has also started acupuncture for pain control and noting some improvements.      Plan:   Continue Morphine extended release 15 mg 2-3 times a day.    Continue Morphine immediate release 15 mg up to 2-3 tablets per day.  Pill count shows MSER #23 and MSIR #16 from refill date 07/03/17   Next refill is sent for on or after 08/14/17  Continue acupuncture, goal of weekly visits to monitor progress  Keep appointment next week for new orthotics through [facility]  Follow up in 8 weeks with Dolores.    Dolores Bobby PA-C  [facility]  [address]  [place] [address]  Ph: [phone]  Fax: [phone]

## 2021-06-25 NOTE — PROGRESS NOTES
Progress Notes by Dolores Bobby PA-C at 11/29/2017  3:00 PM     Author: Dolores Bobby PA-C Service: -- Author Type: Physician Assistant    Filed: 5/10/2018  7:59 AM Date of Service: 11/29/2017  3:00 PM Status: Addendum    : Dolores Bobby PA-C (Physician Assistant)    Related Notes: Original Note by Dolores Bobby PA-C (Physician Assistant) filed at 11/30/2017  4:54 PM       PAIN CLINIC PROGRESS NOTE    Subjective:   Keshia Arellano is a 57 y.o. female who presents for evaluation of low back pain.  She has history of lumbar arachnoiditis.  She has had numerous lumbar epidural injections in the past; worried to do repeat injections due to concern of worsening her arachnoiditis.  She has been educated on possible lumbar facet treatments or medial branch blocks to treat axial low back pain. She has other multisite pain including pelvic pain, cervicalgia, TMJ, myalgias.    Major issues:  1. Chronic pain syndrome    2. Arachnoiditis    3. Opioid Dependence With Continuous Use       Pain Score: 7/10 constant sore, dull, aching, pulling, limiting pain in mid and lower back. Function rated 8. Last visit pain rated 5/10.  No changes in pain symptoms.   Radiation of pain: neck radiates to shoulders lower back pain with radiation to hip  Paresthesias, numbness, weakness: Denies any recent numbness in her upper extremities.    Gait disturbance: positive. Ambulating with cane, denies recent falls.  Exacerbating/relieving factors: Pain improves with rest, medications, heating pad, bath with epsom salt/aromatherapy, medical cannabis.  Has help with cleaning, laundry as these activities exacerbate pain.  Grocery shopping increases her pain but she does have a cart she can use at her apartment which helps.    Adverse effects of medications: constipation; using flax seed, dietary, senna 2-3/day.  Improved since taking less opioids daily.  Started supplement prune lax.  Using miralax frequently, not  daily.  Some nausea and GERD.  Associated symptoms: Occipital headaches, spasms, weight gain, depression, sleep interruption due to pain, pelvic pain symptoms  Current treatment efficacy: Good.  Has been able to reduce dosing of morphine (see below).  Generally taking MSER 15 mg every 8-12 hours, MSIR 15 mg BID-TID prn.  Current treatment compliance: Good.  Reducing opioid and adjunct medications.  Intermittent therapy with pelvic , continues with home therapy to help with pain flares.      She states low appetite and not eating well; she went in the [facility] on 11/22/17 and was diagnosed with microscopic hematuria and also saw PCP this week and weight loss 5 pounds.  She is having a lot of burping, GERD, etc.  Dr. Farr 12/08 will recheck labs.  GI Specialist appointment pending.  She was prescribed hyoscyamine sublingual 0.125 mg TID which is helping.   She cannot get checked for H. Pylori due to Prilosec.   She is checking her blood pressure as she stopped her diuretic.      She states [employer] stopped her disability payments and she states she tried to work with vocational specialist in the past and she has been a wreck last week about this.  She states she is waiting for her  to answer some questions.  She is not sure what she will do, she feels she should be in assisted living and cannot do any gainful employment.  She states she finds no enjoyment in doing crafts or anything at home any more and barely leaves the house except for medical appointments.  She has some savings but states this could ultimately impact her ability to participate in medical cannabis program.    She states she is taking less prescribed daily dose of morphine since medical cannabis.  She has discontinued Naproxen and Baclofen for pain.  She is taking Morphine ER and IR 15 mg 2-3 times per day  She feels she cannot drive after using medical cannabis and be as present so uses later in the  "evening as she does feel sedated from it.  She was recertified by this provider in July as she had good benefit.   She asks if she can take 30 mg dose for times when she has severe pain as sometimes she cannot use the cannabis and needs more relief than 15 mg morphine.    Reviewed [place] Cannabis website today for dosing:      She states she had difficulty getting refill week of Thanksgiving as there was a mix up with the schedule and she was unable to go in as she didn't sleep more than 2 hours the night before her appointment.  She states she is interested in the new topical that she wants to discuss with pharmacist.  She states she did try 1 visit with [facility] filled yellow 2-3 puffs every 2-3 hours and red 2-3 puffs every 2-3 hours and she states she obtained headaches due to being mixed with MCT and she didn't feel it helped as much.    Started sleep supplement which she did not bring to visit today but recalls ingredients valerian root, B vitamins, and poppy seed extract.  1 capsule at night.  Increased trazodone to 200 mg per psychiatrist for sleep.    Review of Systems  A 12 point ROS was completed and was positive for headache, TMJ, pelvic pain, arthralgias, constipation, low back pain, weakness, depression with anxiety, sleep disturbance, visual disturbance requiring glasses, GERD, weight loss 5 pounds.  Urinary urgency and sometimes urinary incontinence.   Otherwise negative.      Objective:     Vitals:    11/29/17 1516   BP: 127/81   Pulse: 91   Resp: 16   Weight: 199 lb (90.3 kg)   Height: 5' 6\" (1.676 m)   PainSc:   7     Physical Exam  General- patient is alert and oriented, in NAD, well-groomed, well-nourished. Appears fatigued. Obese, appears stated age.  Presents alone today.    Psych- Judgment and insight normal, AOx4, recent and remote memory normal, mood and affect tearful, concerned, depressed  Respiratory- breathing is non-labored, regular rate and rhythm.  Cardiovascular- extremities " warm and well perfused, no peripheral edema or varicosities  Dermatologic - Exposed skin is CDI  Musculoskeletal - Ambulating with single-point cane with slight antalgia on the right.  Able to sit to stand with difficulty.     *Opioid [place] Precautions:    UDS/Swab - 10/04/17 as expected  Opioid Consent - due  Opioid Agreement - 08/09/2017  Pharmacy- as documented    [place]  Reviewed - 11/29/17 as expected  Pill Count - 10/04/17  Psychological evaluation - outside source  MME - up to 90 but averaging around 60   Pharmacogenetic testing - yes    Imaging:  None new.    Assessment:   Keshia Arellano is a 57 y.o. female seen in clinic today for chronic intractable lower back pain secondary to arachnoiditis that affects her QOL and ADLs.  Symptoms are stable and improved with MSContin and MSIR. She started medical cannabis and has done opioid reduction, along with discontinuation of Naproxen, Baclofen, and Ambien. Reports improved sleep and mood and reduction in oral medications.  She has also started acupuncture for pain control and massage and continues HEP and use of orthotics.   She has acute abdominal illness for which she is following PCP/GI specialists and has made her pain symptoms more severe with lack of sleep and increased stress due to disability determination.       Plan:   Continue Morphine extended release 15 mg 2-3 times a day.    Continue Morphine immediate release 15 mg up to 2-3 tablets per day.  Just filled on 11/27/17 so please contact when you have about 1 week left for next refill  Continue use of medical cannabis as prescribed  Continue seeing PCP and GI specialist to evaluate stomach symptoms  Follow up in 8 weeks with Dolores.    Greater than 50% of this 30 minute visit was spent in face to face discussion of pain symptoms, interval health changes, and also psychosocial and vocational concerns which are impacting her pain control.    Dolores Bobby PA-C  [facility]  [address]  [place] [address]  Ph: [phone]  Fax: [phone]

## 2021-06-25 NOTE — PROGRESS NOTES
Progress Notes by Dolores Bobby PA-C at 6/14/2017  3:20 PM     Author: Dolores Bobby PA-C Service: -- Author Type: Physician Assistant    Filed: 6/14/2017  4:58 PM Date of Service: 6/14/2017  3:20 PM Status: Signed    : Dolores Bobby PA-C (Physician Assistant)       PAIN CLINIC PROGRESS NOTE    Subjective:   Keshia Arellano is a 56 y.o. female who presents for evaluation of low back pain.  She has history of lumbar arachnoiditis.  She has had numerous lumbar epidural injections in the past; worried to do repeat injections due to concern of worsening her arachnoiditis.  She has been educated on possible lumbar facet treatments or medial branch blocks to treat axial low back pain. She has other multisite pain including pelvic pain, cervicalgia, TMJ, myalgias.    Major issues:  1. Chronic pain syndrome    2. Opioid Dependence With Continuous Use    3. Arachnoiditis    4. Cervical Disc Degeneration       Pain Score: 7/10 constant aching, sore, hurting pain in lower back, hip, shoulder, headache pain. Function rated 8. Last visit pain rated 6/10.  No changes in pain symptoms.   Radiation of pain: neck radiates to shoulders lower back pain with radiation to hip  Paresthesias, numbness, weakness: Denies any recent numbness in her upper extremities.    Gait disturbance: positive. Ambulating with cane, denies recent falls.  Exacerbating/relieving factors: Pain improves with rest, medications, heating pad, bath with epsom salt/aromatherapy, medical cannabis.  Has help with cleaning, laundry as these activities exacerbate pain.  Grocery shopping increases her pain but she does have a cart she can use at her apartment which helps.    Adverse effects of medications: constipation; using flax seed, dietary, senna 2-3/day.  Improved since taking less opioids daily.  Started supplement prune lax.  Using miralax frequently, not daily.  Some nausea and GERD.  Associated symptoms: Occipital headaches, spasms,  weight gain, depression, sleep interruption due to pain, pelvic pain symptoms  Current treatment efficacy: Good.  Has been able to reduce dosing of morphine (see below).   Current treatment compliance: Good.  Reducing opioid and adjunct medications.  Intermittent therapy with pelvic , continues with home therapy to help with pain flares.      She reports a low back pain flare today as she has been sitting at her table completing paperwork the past several nights.  [employer] paperwork for disability, last done .  Usually Dr. Gurrola fills out physician statement but she states it wasn't requested this time.  She states someone is coming to her home for part of the assessment and she has already reached out to her  as this is stressful for her.    She states last night she had dizziness, headache, neck pain doing her paperwork due to looking down for prolonged periods.  She states dizziness today has subsided.  Neck stretches and doing HEP.  Using CBD lotion.  She has TENS unit but hasn't used in years, irritated her nervous system.  Heat does help, hasn't used since winter and avoids ice.    She states she is taking less prescribed daily dose of morphine since medical cannabis.  She states now her mood has improved as she is in less pain has less depression and reports even her sister noticed and has made positive remarks about her mood. She has discontinued Naproxen and Baclofen for pain.  She is taking Morphine ER and IR 15 mg 2-3 times per day  She feels she cannot drive after using medical cannabis and be as present so uses later in the evening as she does feel sedated from it.   She states she was seen at dispensary on 17 she is stills using tangerine vapor puffs increased 1-5 and combination works better for her feels pain relief.  She was told at night not to use as it lowers some of the effects of THC like drowsiness.    She requests to be re-certified as this will   "August 01, 2017.  Reviewed [place] Department of Health Website with following cannabis doses:     She saw psychiatrist increased her trazodone to 50 mg which is somewhat helpful for sleep. Sleep not waking as often and sometimes gets back to sleep easier. She is interested in stopping Ambien but didn't discuss a tapering schedule versus abrupt discontinuation.  She states her mood improved with pain relief.  She states she found an acupuncturist in [place] with fragrence free environment but wasn't able to schedule yet, still interested.    Review of Systems  A 12 point ROS was completed and was positive for headache, TMJ, pelvic pain, arthralgias, constipation, low back pain, weakness, depression with anxiety, sleep disturbance, visual disturbance requiring glasses, GERD.  Urinary urgency and sometimes urinary incontinence.   Otherwise negative.      Objective:     Vitals:    06/14/17 1534   BP: 116/77   Pulse: 80   Resp: 16   Weight: 214 lb (97.1 kg)   Height: 5' 6\" (1.676 m)   PainSc:   7     Physical Exam  General- patient is alert and oriented, in NAD, well-groomed, well-nourished.  Obese, appears stated age.  Presents alone today.  Psych- Judgment and insight normal, AOx4, recent and remote memory normal, mood and affect appropriate.  Respiratory- breathing is non-labored, regular rate and rhythm.  Cardiovascular- extremities warm and well perfused, no peripheral edema or varicosities  Dermatologic - Exposed skin is CDI  Musculoskeletal - Ambulating with single-point cane with slight antalgia on the right.  Able to sit to stand without difficulty.     *Opioid [place] Precautions:    UDS/Swab - 10/10/16 appropriate  Opioid Consent - due  Opioid Agreement - 08/10/2016  Pharmacy- as documented    [place]  Reviewed - 06/14/17 as expected  Pill Count - 06/14/17 as expected  Psychological evaluation - outside source  MME - up to 90  Pharmacogenetic testing - yes    Imaging:  None new.    Assessment:   Keshia MART" Adan is a 56 y.o. female seen in clinic today for chronic intractable lower back pain secondary to arachnoiditis that affects her QOL and ADLs.  Symptoms are stable and improved with MSContin and MSIR. She started medical cannabis and has done opioid reduction, along with discontinuation of Naproxen and Baclofen, considering discontinuation of Ambien next. Reports improved sleep and mood and reduction in oral medications.  Will recertify her for another year as she has met goals of medication reduction and improved symptom control.     Plan:   Continue Morphine extended release 15 mg 2-3 times a day.    Continue Morphine immediate release 15 mg up to 2-3 tablets per day.  Pill count shows MSER #68 and MSIR #75 from refill date 06/03/17   Next refill is sent for on or after 07/06/17   Continue medical cannabis as prescribed and will use email [email or web address] for recertification  Schedule acupuncture as able and bring name of provider to next visit  Follow up in 8 weeks with Dolores.    Dolores Bobby PA-C  [facility]  [address]  [place] [address]  Ph: [phone]  Fax: [phone]

## 2021-06-26 NOTE — PROGRESS NOTES
"Progress Notes by Dolores Bobby PA-C at 4/10/2018  1:32 PM     Author: Dolores Bobby PA-C Service: -- Author Type: Physician Assistant    Filed: 4/15/2018  3:39 PM Date of Service: 4/10/2018  1:32 PM Status: Signed    : Dolores Bobby PA-C (Physician Assistant)       PAIN CLINIC PROGRESS NOTE    Subjective:   Keshia Arellano is a 57 y.o. female who presents for evaluation of low back pain.  She has history of lumbar arachnoiditis.  She has had numerous lumbar epidural injections in the past; worried to do repeat injections due to concern of worsening her arachnoiditis.  She has been educated on possible lumbar facet treatments or medial branch blocks to treat axial low back pain. She has other multisite pain including pelvic pain, cervicalgia, TMJ, myalgias.    Major issues:  1. Chronic pain syndrome    2. Opioid Dependence With Continuous Use    3. Arachnoiditis    4. Arthralgias In Multiple Sites       Pain Score: 7/10 constant aching, sore, stiff, deep, hurting, tiring pain in mid and lower back. Function rated 8. Pain at best is 4/10, at worst is 9/10 and on average is 6/10.   Radiation of pain: neck radiates to shoulders lower back pain with radiation to hip  Paresthesias, numbness, weakness: Denies any recent numbness in her upper extremities.    Gait disturbance: positive. Ambulating with cane, denies recent falls.  Exacerbating factors: Activity such as standing, sitting, upright walking  Alleviating factors: rest, fascial bodywork, stretching, HEP  Adverse effects of medications: constipation; treated with dietary, Using miralax frequently, not daily.  Fatigue, drowsiness, less attention and cannot drive after use of medical cannabis as she feels \"spaced out.\"    Associated symptoms: Occipital headaches, spasms, weight gain, depression, sleep interruption due to pain, pelvic pain symptoms.  Left hand numbness x 1 day after FCE. Weakness in legs, pelvis, hips, arms.  Pain at night.  " She denies bowel bladder incontinence (except stress incontinence), unexplained weight loss, fever/chills  Functional symptoms: Pain interferes with sleep, walking, work, activities of daily living, relationships/social, sexual health, mood (depressed, angry, frustrated, anxious and pain is not controlled, helpless/hopeless).  States function has not improved with current pain treatment.  Current treatment efficacy: Fair.  Has been able to reduce dosing of morphine (see below).  Generally taking MSER 15 mg every 8-12 hours, MSIR 15 mg BID-TID prn.  Wants to discuss adjustment of her morphine dose as she is using less medical marijuana due to cost.  Current treatment compliance: Good.  Reducing opioid and adjunct medications.  Intermittent therapy with pelvic , continues with home therapy to help with pain flares.     Since last visit the patient denies any chance of being pregnant, any new diagnostic testing, any visits to the ER/urgent care, any new treatments or new medical conditions.  She states that her pain is not changed since her last visit and she denies any new injuries or falls and is having some pain adjusting to new orthotics and modifications.  Since last visit she has participated in PT and HEP, dietary changes for pain plan.    She had FCE at [facility] 02/19/18 and she has given results to her , will not be able to meet with her until April. She requested an extension to contest the disability determination which is due 05/07/18.  She can see Dr. Gurrola on 04/17/18.       She states her orthotics caused increased pain in sacrum, legs, lower back.  She showed her physical therapist and was told to get new ones made at [facility].   She now has new ones which are now completely different, she states they are hard and causes her legs, feet, ankles to hurt worse. She continues with Dilma Durand [facility], [facility].  Fascial work continues around pelvis and ribs and  "she states during treatment it feels \"horrible\" scheduled next week and about 2-3 weeks out. She continues to also do visceral manipulation therapy.  She was unable to start [employer] as this was duplication of therapy.  PT modified the orthotics with an arch support in middle.  She will see Dr. Gurrola next week for PAT results review.  Not drinking any more coffee, more mathew tea.  No more prilosec as she felt she was taking it for GERD which she states looking back is from myofascial chest pain.      She takes Morphine in the morning MSIR and MSER 15 mg upon waking, she states she is always in pain when she wakes.  She sometimes has to wait an hour to get pain relief after taking. She will use medical cannabis at home if she is not leaving the house.  Will repeat MSIR in 4 hours prn.  She then repeats MSER at 2000 and also MSIR around this time and goes to bed.  IF she wakes at 0200 she takes the MSER and then she doesn't feel as much pain in the morning.  She states last night difficult to get to sleep due to leg pain.  She wants to discuss increasing to 4 doses a day of each.  We review CDC guidelines and that if she takes MSER 15 mg and MSIR 15 mg TID that she is at the maximum recommended daily dose.  She would like to consider doing more pharmacogenetics testing, as her initial panel done by [facility] did not include pathways including COMT and OPRM1, which account for the metabolism of morphine and codeine.  She also does not have information on CYP2B6 which would be helpful to assess if she is a good candidate to try ketamine.  She is advised that each individual screen is $200, versus running the entire panel again of 22 pathways through [facility] is $459.  She will do entire test and has requested the kit sent to her house, provider will need to sign the order.    Reviewed [place] Cannabis website today for dosing:      She states this is still helpful, but she is using more sparingly as she has less finances since " "her disability was denied this past fall.  She is worried about how to manage her pain as she has less ability to fill her medical cannabis due to cost.  Side effects of medical cannabis include \"spaced out, less focus, less present, drowsy, less attention, sedation, altered state, cannot drive if taken at all.\"  She does find it helpful for pain at home or for sleep.  Using less during the day and using more towards evening.  She states this morning she wanted to use as she was sore, aching getting up but didn't use as she had this appointment.  She was able to take MSIR and MSER this morning with hot shower and reduced pain     Review of Systems  A 12 point ROS was completed and was positive for headache, TMJ, pelvic pain, arthralgias, constipation, low back pain, weakness, depression with anxiety, sleep disturbance, visual disturbance requiring glasses, GERD, weight loss.  Urinary urgency and sometimes urinary incontinence.   Otherwise negative.      Objective:     Vitals:    04/10/18 1348   BP: 142/85   Pulse: 75   Resp: 16   Weight: 186 lb (84.4 kg)   Height: 5' 6\" (1.676 m)   PainSc:   7     Physical Exam  General- patient is alert and oriented, in NAD, well-groomed, well-nourished. Appears fatigued. Obese, appears stated age.  Presents alone today.    Psych- Judgment and insight normal, AOx4, recent and remote memory normal, mood and affect concerned, depressed.    Respiratory- breathing is non-labored, regular rate and rhythm.  Cardiovascular- extremities warm and well perfused, no peripheral edema or varicosities  Dermatologic - Exposed skin is CDI  Musculoskeletal - Ambulating with single-point cane with slight antalgia on the right.  Able to sit to stand with difficulty.     *Opioid [place] Precautions:    UDS/Swab - 10/04/17 as expected  Opioid Consent - due  Opioid Agreement - 08/09/2017  Pharmacy- as documented    [place]  Reviewed - 04/10/18 as expected    Pill Count - 04/10/18 Morphine ER #70 and " MSIR #64  Psychological evaluation - outside source  MME - 90  Pharmacogenetic testing - yes  KAILASH 01/24/18    Imaging:  None new.    Assessment:   Keshia Arellano is a 57 y.o. female seen in clinic today for chronic intractable lower back pain secondary to arachnoiditis that affects her QOL and ADLs.  Symptoms are stable and improved with MSContin and MSIR. She started medical cannabis and has done opioid reduction, along with discontinuation of Naproxen, Baclofen, and Ambien.  She has concerns about ability to continue medical cannabis due to cost and is pursuing with her  to contest the denial of her disability as she recent had a Physical Abilities Assessment (PAT).  She has concerns about controlling her pain on current morphine dose, recommend checking pharmacogenetic test regarding OPRM and COMT genes to see if there is altered metabolism which may allow for adjustment in dosing of morphine.  Will review this in conjunction with our pharmacist at the pain center.      Plan:   Continue Morphine extended release 15 mg 2-3 times a day.    Continue Morphine immediate release 15 mg up to 2-3 tablets per day.  Ok to fill on or after 04/25/18  Continue use of medical cannabis as prescribed  Pharmacogenetic testing to be completed through [facility] - follow-up with Rola Goff PharmD regarding these results and any impact on medications in May.   Continue PT as scheduled and also see Dr. Gurrola next week  Follow-up in  8 weeks with Dolores BOLTON.    Dolores Bobby PA-C  [facility]  [address]  [place] [address]  Ph: [phone]  Fax: [phone]

## 2021-06-26 NOTE — PROGRESS NOTES
Progress Notes by Dolores Bobby PA-C at 8/7/2018  2:38 PM     Author: Dolores Bobby PA-C Service: -- Author Type: Physician Assistant    Filed: 8/14/2018  8:02 AM Date of Service: 8/7/2018  2:38 PM Status: Signed    : Dolores Bobby PA-C (Physician Assistant)       PAIN CLINIC PROGRESS NOTE    Subjective:   Keshia Arellano is a 57 y.o. female who presents for evaluation of low back pain.  She has history of lumbar arachnoiditis.  She has had numerous lumbar epidural injections in the past; worried to do repeat injections due to concern of worsening her arachnoiditis.  She has been educated on possible lumbar facet treatments or medial branch blocks to treat axial low back pain. She has other multisite pain including pelvic pain, cervicalgia, TMJ, myalgias.    Major issues:  1. Chronic pain syndrome    2. Arachnoiditis    3. Opioid Dependence With Continuous Use    4. Vitamin D insufficiency       Pain Score: 6/10 constant deep, aching, tingling pain in low back, pelvis, legs, neck, shoulders, ribs. Function rated 8.  Last visit pain rating was 6/10.  Pain at best is 5/10, at worst is 10/10, and on average is 7/10.   Gait disturbance: positive. Ambulating with cane, denies recent falls.  Exacerbating factors: Activity   Alleviating factors: rest, medications, HEP  Adverse effects of medications: constipation; improving with protein smoothie & miralax daily.  Sedation, spaced out, foggy tired with medical cannabis.  Associated symptoms: Denies numbness.  Reports weakness in multiple muscle groups.  Pain at night.  She denies bowel bladder incontinence (has urinary urgency), unexplained weight loss, fever/chills.  She does report night sweats.  Functional symptoms: Pain interferes with sleep, walking, work, activities of daily living, relationships/social, sexual health, mood (depressed, helpless/hopeless).  States function has/has not improved with current pain treatment.  Current treatment  "efficacy: Fair.  Taking MSER 15 mg TID, MSIR 15 mg TID prn.  Takes both in the morning and then spaces out doses by 4 hours. States on occasion taking MSER 30 mg dose in the morning if she wakes to take one and then repeats dose within 1-2 hours feels better pain control that way.  Current treatment compliance: Good.  Reducing opioid and adjunct medications.  Intermittent therapy with pelvic , continues with home therapy to help with pain flares.     Since last visit the patient denies any chance of being pregnant, any new diagnostic testing, any visits to the ER/urgent care.  She reports she has participated in behavioral health visits, and home therapy program/PT since last seen.  She has questions today related to hormones and pain and has articles relating to that.  She reports her pain has worsened without new injury as she is having more spasms in her left lower lumbar and she is curious if there are other treatments that could assist her.  She has seen Dr. Melecio Tran & Dr. Dieter Gayle psychiatrist on 06/22/18 since last visit.    \"ASSESSMENT     Pertinent Background: See most recent Transfer Evaluation as dated above.    TODAY: Reports mood has been \"depressed\" and \"frustrated' in the context of stress related to losing her long-term disability, working to appeal this decision with her , and chronic pain. Pt has also felt frustrated by her perceived lack of support from her medical providers regarding the severity and legitimacy of her chronic pain. In addition, she expressed frustration with her psychiatric care, particularly during [facility] She states she is not sure if she feels \"safe\" getting her care here going further and is interested in being seen elsewhere. Dicussed with pt that we understand she has felt frustrated and informed her we would assist her in being referred elsewhere if she no longer wanted to be seen in our clinic. Pt did request " to be seen elsewhere. Informed pt would would have her speak with our SW to assist with this referral. Also discussed with pt (as we did at previous appointments) that she could reach out to patient relations to discuss her concerns further, if needed.    PSYCHOTROPIC DRUG INTERACTIONS:     SERTRALINE and TRAZODONE may result in increased risk of serotonin syndrome  Management:  Monitoring for adverse effects, routine vitals and patient is aware of risks    PLAN     1) PSYCHOTROPIC MEDICATIONS:  - Continue Zoloft 150 mg daily      - Continue trazodone  mg QHS prn sleep    2) THERAPY: Recommend starting, she has been given a list of referrals in the past.    3) LABS NEXT DUE: none       RATING SCALES:    none    4) REFERRALS [CD, medical, other]:  SW referral to help with obtaining a referral to another psychiatric provider.    5) :  none    6) RTC: TBD.     7) CRISIS NUMBERS: Provided routinely in AVS     She continues with Dilma CUEVAS, [facility] PT every 2 weeks.  She continues to also do visceral manipulation therapy.      Reviewed [place] Cannabis website today for dosing:      She is using topical on days she goes out and cannot use the vaper.  She states some days pain is so bad she is using both like last night.  She was recommended to use 5 puffs of both lasts 3 hours and trying to do that more often.  Side effects of sedation, spaced out, foggy, tired.  She states she went through faster and having difficulty with cost.      Review of Systems  A 12 point ROS was completed and was positive for headache, TMJ, pelvic pain, arthralgias, constipation, low back pain, weakness, depression with anxiety, sleep disturbance, visual disturbance requiring glasses, GERD, weight loss.  Urinary urgency and sometimes urinary incontinence.   Otherwise negative.      Objective:     Vitals:    08/07/18 1450   BP: 149/83   Pulse: 78   Resp: 16   Weight: 196 lb (88.9 kg)   Height: 5'  "6\" (1.676 m)   PainSc:   6     Physical Exam  General- patient is alert and oriented, in NAD, well-groomed, well-nourished. Obese, appears stated age.  Presents alone today.    Psych- Judgment and insight normal, AOx4, recent and remote memory normal, mood and affect concerned, depressed.    Respiratory- breathing is non-labored, regular rate and rhythm.  Cardiovascular- extremities warm and well perfused, no peripheral edema or varicosities  Dermatologic - Exposed skin is CDI  Musculoskeletal - Ambulating with single-point cane with slight antalgia on the right.  Able to sit to stand with difficulty.     *Opioid [place] Precautions:    UDS/Swab - 10/04/17 as expected  Opioid Consent - 08/07/18  Opioid Agreement - 08/07/18  Pharmacy- as documented    [place]  Reviewed - 08/07/18 as expected    Pill Count - 08/07/18 Morphine ER #65 and MSIR #47  Psychological evaluation - outside source  MME - 90  Pharmacogenetic testing - yes  KAILASH 06/07/18    Imaging:  None new.    Assessment:   Keshia Arellano is a 57 y.o. female seen in clinic today for chronic intractable lower back pain secondary to arachnoiditis that affects her QOL and ADLs.  Symptoms are improved with MSContin and MSIR. She continues medical cannabis and has done opioid reduction to be within CDC guidelines, along with discontinuation of Naproxen, Baclofen, and Ambien.  She continues PT/OT with home exercise program, use of inserts and equipment.   She is interested in hormone assessment and replacement therapies if she is a candidate.  She is interested in trial of oxytocin for chronic pain, review this treatment today.       Plan:   Continue Morphine extended release 15 mg every 8 hours.  IF you want to take a 30 mg dose in the morning, you may and then space the remaining dose out to 12 hours to not exceed 3 doses in 24 hours.  Continue Morphine immediate release 15 mg up to 2-3 tablets per day.  As your opioid dose remains stable, I will send electronic " refills to the pharmacy for 2 subsequent months until your next appointment so you do not have to call our refill line.  The fill dates for your medications are:  08/16/18 & 09/13/18  Check with your pharmacy that all prescriptions are on your profile. Call the pharmacy a week before you want to fill the prescription as stock may vary and the pharmacy may need to order the medication for you. I reserve the right to cancel the electronic prescription at the pharmacy in between appointments and would contact you with an explanation if this were to occur.  Continue use of medical cannabis as prescribed   Opioid agreement and consent form signed today  Reviewed hormone relationship to chronic pain - will check Vitamin D, testosterone, progesterone, DHEA, estradiol, and trial oxytocin 20 units 3 times a day for chronic pain.  Start this first.  Reviewed options for ketamine 10 mg delphine 1 every 4-6 hours as needed for breakthrough pain.  Trial for #30 troches, sent to [facility] pharmacy in [place].    Follow-up in  8 weeks with Dolores BOLTON.    Greater than 50% of this 45 minute visit spent in face to face discussion of pain symptoms, pain treatments including medications and alternatives to opioids including ketamine, oxytocin.  Review medical cannabis program.    Dolores Bobby PA-C  [facility]  [address]  [place] [address]  Ph: [phone]  Fax: [phone]

## 2021-06-26 NOTE — PROGRESS NOTES
Progress Notes by Dolores Bobby PA-C at 1/24/2018  3:00 PM     Author: Dolores Bobby PA-C Service: -- Author Type: Physician Assistant    Filed: 1/30/2018  7:46 AM Date of Service: 1/24/2018  3:00 PM Status: Signed    : Dolores Bobby PA-C (Physician Assistant)       PAIN CLINIC PROGRESS NOTE    Subjective:   Keshia Arellano is a 57 y.o. female who presents for evaluation of low back pain.  She has history of lumbar arachnoiditis.  She has had numerous lumbar epidural injections in the past; worried to do repeat injections due to concern of worsening her arachnoiditis.  She has been educated on possible lumbar facet treatments or medial branch blocks to treat axial low back pain. She has other multisite pain including pelvic pain, cervicalgia, TMJ, myalgias.    Major issues:  1. Chronic pain syndrome    2. Opioid Dependence With Continuous Use    3. Lumbar Disc Degeneration    4. Cervical Disc Degeneration       Pain Score: 5/10 constant aching, sore pain in mid and lower back. Function rated 8. Last visit pain rated 7/10.  No changes in pain symptoms.   Radiation of pain: neck radiates to shoulders lower back pain with radiation to hip  Paresthesias, numbness, weakness: Denies any recent numbness in her upper extremities.    Gait disturbance: positive. Ambulating with cane, denies recent falls.  Exacerbating/relieving factors: Pain improves with rest, medications, heating pad, bath with epsom salt/aromatherapy, medical cannabis.  Has help with cleaning, laundry as these activities exacerbate pain.  Grocery shopping increases her pain but she does have a cart she can use at her apartment which helps.    Adverse effects of medications: constipation; using flax seed, dietary, senna 2-3/day.  Improved since taking less opioids daily.  Started supplement prune lax.  Using miralax frequently, not daily.  Some nausea and GERD.  Associated symptoms: Occipital headaches, spasms, weight gain,  "depression, sleep interruption due to pain, pelvic pain symptoms  Current treatment efficacy: Good.  Has been able to reduce dosing of morphine (see below).  Generally taking MSER 15 mg every 8-12 hours, MSIR 15 mg BID-TID prn.  Current treatment compliance: Good.  Reducing opioid and adjunct medications.  Intermittent therapy with pelvic , continues with home therapy to help with pain flares.      She states she had insomnia which she traced back to starting turmeric supplement.  She had pharmacogenetic test which tumeric is contraindicated and she didn't know that and also was in the herbal teas that she was using called gonzalez milk to help her stomach symptoms and for inflammation.  She states she spoke to [employer] representative and printed copies today of her PGT results (scanned in).  She was taking Zoloft at bedtime and was advised to take in the morning and since these changes has been sleeping through all night.  She was at Zoloft 200 mg now has reduced to 150 mg in the am.  She will see psychiatrist tomorrow to discuss further.  She has discontinued dicyclomine and hyoscyamine and restarted prilosec 20 mg.  She also called her old therapist and will see her again, her name is Dr. Tiesha Parr.   She feels her PTSD was triggered by recent disability denial and also hospital visit below.    She had admission to [facility] from 01/15/18-01/18/18 for insomnia, anxiety, and depression.  Reviewed summary:  \"DIagnoses:   1. Bipolar disorder, currently manic with possible psychosis.   2. Rule out delirium   3. Rule out substance-induced mood disorder.   4. History of posttraumatic stress disorder.   5. History of anorexia and binge eating disorder.   6. Opiate dependency.   7. Chronic pain.   Consults:   Consultation during this admission received from internal medicine    Hospital Course:   Keshia Arellano was admitted to [facility] [place] with attending Cortney ORR, CELIA, as " "a voluntary patient. The patient was placed under status 15 (15 minute checks) to ensure patient safety.     The following medication changes took place: Started Zyprexa 2.5 mg, qam, 5 mg, qHS. The patient tolerated medications well. Reported mood symptoms resolved. The patient did not attend group activities. She remained tangential and disorganized, which according to her sister, Janie, is baseline. Her anxiety decreased significantly but was rated as moderate upon discharge. States she feels more anxious because she is in the hospital and does not have her usual routine and belongings. Patient slept for 7-10 hours each day she was hospitalized. No psychosis noted. The patient maintained denial of SI, HI and ANDRE. The patient denied depression, racing thoughts and irritability. Future oriented, feeling hopeful for the future. The patient was compliant with medications and care.     Keshia Arellano was released to home. At the time of this encounter, Keshia Arellano was determined to not be a danger to herself or others and symptoms did not meet criteria for involuntary hospitalization.     Safety plan, post discharge recommendations and relapse prevention were discussed with the patient. The patient agreed to call 911 or present to ED if symptoms worsen or developed thoughts of suicide, self harm or homicide. The patient agreed to continue medications and outpatient care.\"    She called here yesterday stating she was upset about how her medications were dosed to her and wanted to discuss that today.  She states she wasn't given Morphine every 8 hours which frustrated her as she wasn't able to use her cannabis while inpatient.  She asked them to verify dose with the pain center and she states they wouldn't call.  She states she was made to take Zyprexa in the hospital.  She states she didn't know it was an antipsychotic and was told with Dr. Ray that she cannot take it due to pharmacogenetic test results.  She " "states she doesn't have anxiety and is not taking it at home.  She state while in hospital she didn't have cane, orthotic pillow, shoes, nightstand which help her manage chronic pain.  She asked about PT and wasn't able to see anyone for her pain while there and is very frustrated by the entire experience, has reached out to patient advocacy.    She saw Dilma Durand [facility] and brought note today 1/08/18.  She felt that worked very well for relieving some pain symptoms in her rib cage and abdomen.  She  Is scheduled for 02/14/18 visceral manipulation.      She states she is taking less prescribed daily dose of morphine since medical cannabis.  She has discontinued Naproxen and Baclofen for pain.  She is taking Morphine ER and IR 15 mg 2-3 times per day  She feels she cannot drive after using medical cannabis and be as present so uses later in the evening as she does feel sedated from it.  She was recertified by this provider in July as she had good benefit.   She asks if she can take 30 mg dose for times when she has severe pain as sometimes she cannot use the cannabis and needs more relief than 15 mg morphine.     Reviewed [place] today for dosing:      Review of Systems  A 12 point ROS was completed and was positive for headache, TMJ, pelvic pain, arthralgias, constipation, low back pain, weakness, depression with anxiety, sleep disturbance, visual disturbance requiring glasses, GERD, weight loss.  Urinary urgency and sometimes urinary incontinence.   Otherwise negative.      Objective:     Vitals:    01/24/18 1517   BP: 140/82   Pulse: 91   Resp: 16   Weight: 186 lb (84.4 kg)   Height: 5' 6\" (1.676 m)   PainSc:   5     Physical Exam  General- patient is alert and oriented, in NAD, well-groomed, well-nourished. Appears fatigued. Obese, appears stated age.  Presents alone today.    Psych- Judgment and insight normal, AOx4, recent and remote memory normal, mood and " affect tearful, concerned, depressed  Respiratory- breathing is non-labored, regular rate and rhythm.  Cardiovascular- extremities warm and well perfused, no peripheral edema or varicosities  Dermatologic - Exposed skin is CDI  Musculoskeletal - Ambulating with single-point cane with slight antalgia on the right.  Able to sit to stand with difficulty.     *Opioid [place] Precautions:    UDS/Swab - 10/04/17 as expected  Opioid Consent - due  Opioid Agreement - 08/09/2017  Pharmacy- as documented    [place]  Review[place] - 1/24/18 as expected  Pill Count - 01/24/18 Morphine ER #23 and MSIR #20  Psychological evaluation - outside source  MME - up to 90 but averaging around 60   Pharmacogenetic testing - yes  KAILASH Score: 66 01/24/18    Imaging:  None new.    Assessment:   Keshia Arellano is a 57 y.o. female seen in clinic today for chronic intractable lower back pain secondary to arachnoiditis that affects her QOL and ADLs.  Symptoms are stable and improved with MSContin and MSIR. She started medical cannabis and has done opioid reduction, along with discontinuation of Naproxen, Baclofen, and Ambien. Reports improved sleep and mood and reduction in oral medications.  However, this past month had difficulty with insomnia and mood as she added in tumeric and GI medications which altered how her Zoloft and pain medications were assisting her due to pharmacogenetics and she did have hospitalization at [facility].  We spent a great deal of time today reviewing her hospital course, her medications with pharmacogenetic results, and also her recommendations going forward including continued work with PT and BH. She has also started acupuncture for pain control and massage and continues HEP and use of orthotics.      Plan:   Continue Morphine extended release 15 mg 2-3 times a day.    Continue Morphine immediate release 15 mg up to 2-3 tablets per day.  Continue use of medical cannabis as prescribed  See psychiatrist as scheduled  tomorrow and working on returning to psychotherapist as well for PTSD   Order for visceral manipulation - [facility].  Also continue with Dilma for rib cage dysfunction, next visit 02/14/18  I will call Farrukh at [facility] to see what the next steps are for adding to pharmacogenetic testing.  May consider ketamine trial in the future.  Follow-up in 4 weeks with Dolores; if stable can return in 8 weeks as usual    Dolores Bobby PA-C  [facility]  [address] [place] [address]. [address]  [place] [address]  Ph: [phone]  Fax: [phone]

## 2021-06-26 NOTE — PROGRESS NOTES
Progress Notes by Dolores Bobby PA-C at 6/7/2018  2:40 PM     Author: Dolores Bobby PA-C Service: -- Author Type: Physician Assistant    Filed: 6/9/2018  9:43 AM Date of Service: 6/7/2018  2:40 PM Status: Signed    : Dolores Bobby PA-C (Physician Assistant)       PAIN CLINIC PROGRESS NOTE    Subjective:   Keshia Arellano is a 57 y.o. female who presents for evaluation of low back pain.  She has history of lumbar arachnoiditis.  She has had numerous lumbar epidural injections in the past; worried to do repeat injections due to concern of worsening her arachnoiditis.  She has been educated on possible lumbar facet treatments or medial branch blocks to treat axial low back pain. She has other multisite pain including pelvic pain, cervicalgia, TMJ, myalgias.    Major issues:  1. Opioid Dependence With Continuous Use    2. Chronic pain syndrome    3. Arachnoiditis    4. Arthralgias In Multiple Sites       Pain Score: 6/10 constant radiating, sore, irritating aching pain in low back, right pelvis, right leg pain worse today. Function rated 8. Pain at best is 5/10, at worst is 10/10.    Gait disturbance: positive. Ambulating with cane, denies recent falls.  Exacerbating factors: Activity such as standing, sitting, any movement  Alleviating factors: rest, medications, HEP  Adverse effects of medications: constipation; ijmproving with protein smoothie & miralax daily.  Sedation, spaced out, foggy tired with medical cannabis.  Associated symptoms: Numbness in left glut and left pelvis, weakness in bilateral legs, hips, pelvis.  Pain at night.  She denies bowel bladder incontinence (has urinary urgency), unexplained weight loss, fever/chills  Functional symptoms: Pain interferes with sleep, walking (cane), work, activities of daily living, relationships/social, sexual health, mood (depressed, frustrated, helpless/hopeless).  States function has not improved with current pain treatment.  Current  "treatment efficacy: Fair.  Taking MSER 15 mg TID, MSIR 15 mg TID prn.  Takes both in the morning and then spaces out doses by 4 hours.  Current treatment compliance: Good.  Reducing opioid and adjunct medications.  Intermittent therapy with pelvic , continues with home therapy to help with pain flares.     Since last visit the patient denies any chance of being pregnant, any new diagnostic testing, any visits to the ER/urgent care.  She reports new treatments with topical dilma lotion from [facility].  She reports she has participated in PT and MTM visits since last seen.  She denies any questions for today's visit.      She reports worsened pain this week right lower back radiating to right hip and posterior leg not past knee.  Stretched on floor last night.  Using rollers to help settle down muscles.  She states her right orthotic has also been bothering her.  Using constnatly wearing even inside in the house.  Shoulder pain increases trying to lay on rollers for muscle pain.  She denies any new injuries.    She continues with Dilma [facility] PT every 2 weeks.  She tried dry needling last week; she states right lower back and really \"calmed down.\"  She walked to bathroom and felt left side was also working better. Relief lasted that day and a little bit into the next day.  She continues to also do visceral manipulation therapy.      She takes Morphine in the morning MSIR and MSER 15 mg upon waking, she states she is always in pain when she wakes.  She sometimes has to wait an hour to get pain relief after taking.   She saw Rola Goff PharmD on 05/14/18 to review PGT test results:  [place] New Encounter     Assessment/Plan      1. Encounter for pharmacogenetic testing  Please see scanned report for full details. Pertinent findings:   -2D6 intermediate metabolizer  -VKORC1 high sensitivity - would recommend reduced starting warfarin doses if needed in the " future  -2B6 poor metabolizer. From a pain perspective, May have boosted doses of pain medications such as codeine, hydrocodone, or tramadol  -OPRM high sensitivity  -2C19 rapid metabolism status not available to add to problem list, therefore, added to clopidogrel to allergy list. Would effect antiplatelet medication (clopidogrel) if she were to need emergently in the future. Would not preclude use, but may increase risk for bleeding.   -CPY1A2 hyperinducer: Expect higher than average doses of melatonin, R-warfarin (less active enantiomer), rozerem, duloxetine, amitriptyline if considered in the future     Medication considerations:   -She likely is having boosted response to her sertraline based on 2B6 poor metabolism.  Sertraline also affects the 2C19, 2D6, 2C9, and 3A4 pathways.  Reasonable to continue current dose as she is adequately tolerating.   -Patient is concerned about use of ketamine due 2B6 poor metabolizer status.  Ketamine also is extensively metabolized by 2C9 and 3A4 pathways.  We could consider ketamine if needed for pain modulation in the future.  I would recommend starting with a very low dose and slow titration if used.  -Encouraged to share results with [place]     2. Takes dietary supplements  No drug interactions identified. Will research to determine if any of her reported supplements are effected by her genetic testing results.      3. Chronic Pain Syndrome  Stable. OPRM high sensitivity - expect appropriate responsiveness to her MS Contin.     Follow Up  6 months or sooner, if needed      Reviewed [place] Cannabis website today for dosing:      She is using topical on days she goes out and cannot use the vaper.  She states some days pain is so bad she is using both like last night.  She was recommended to use 5 puffs of both lasts 3 hours and trying to do that more often.  Side effects of sedation, spaced out, foggy, tired.      Continues mathew for stomach and avoiding coffee.  Still having  "symptoms of nausea, queasy depending on what she is eating.  She is also doing myofascial release.      Review of Systems  A 12 point ROS was completed and was positive for headache, TMJ, pelvic pain, arthralgias, constipation, low back pain, weakness, depression with anxiety, sleep disturbance, visual disturbance requiring glasses, GERD, weight loss.  Urinary urgency and sometimes urinary incontinence.   Otherwise negative.      Objective:     Vitals:    06/07/18 1457   BP: 133/80   Pulse: 71   Resp: 16   Weight: 196 lb (88.9 kg)   Height: 5' 6\" (1.676 m)   PainSc:   6     Physical Exam  General- patient is alert and oriented, in NAD, well-groomed, well-nourished. Obese, appears stated age.  Presents alone today.    Psych- Judgment and insight normal, AOx4, recent and remote memory normal, mood and affect concerned, depressed.    Respiratory- breathing is non-labored, regular rate and rhythm.  Cardiovascular- extremities warm and well perfused, no peripheral edema or varicosities  Dermatologic - Exposed skin is CDI  Musculoskeletal - Ambulating with single-point cane with slight antalgia on the right.  Able to sit to stand with difficulty.     *Opioid [place] Precautions:    UDS/Swab - 10/04/17 as expected  Opioid Consent - due  Opioid Agreement - 08/09/2017  Pharmacy- as documented    [place]  Reviewed - 06/07/18 as expected    Pill Count - 04/10/18 Morphine ER #70 and MSIR #64  Psychological evaluation - outside source  MME - 90  Pharmacogenetic testing - yes  KAILASH Score: 66 06/07/18    Imaging:  None new.    Assessment:   Keshia Arellano is a 57 y.o. female seen in clinic today for chronic intractable lower back pain secondary to arachnoiditis that affects her QOL and ADLs.  Symptoms are improved with MSContin and MSIR. She continues medical cannabis and has done opioid reduction, along with discontinuation of Naproxen, Baclofen, and Ambien.  She continues PT/OT with home exercise program, use of inserts and " equipment.     Continue to discuss CDC guidelines; review opioid rotation to [facility] to see if more efficacious for pain relief and she will review information on this.  This medication metabolism through CY pathway is appropriate choice for patient per pharmacogenetic testing.  Can return to see Rola for MTM review of medication as well.      Plan:   Continue Morphine extended release 15 mg 2-3 times a day.    Continue Morphine immediate release 15 mg up to 2-3 tablets per day.  Ok to fill on or after 18  Please call our Nurse Triage phone # [phone] seven days in advance for next month's opioid prescription refill to be sent electronically to your pharmacy.  Continue use of medical cannabis as prescribed   Information given on [place] to consider for alternate extended release medication.  Follow-up in  8 weeks with Dolores BOLTON.    Dolores Bobby PA-C  [facility]  [address] [place] [address]. [address]  [place] [address]  Ph: [phone]  Fax: [phone]

## 2021-06-26 NOTE — PROGRESS NOTES
Progress Notes by Dolores Bobby PA-C at 10/4/2018  2:40 PM     Author: Dolores Bobby PA-C Service: -- Author Type: Physician Assistant    Filed: 10/5/2018  8:12 AM Date of Service: 10/4/2018  2:40 PM Status: Signed    : Dolores Bobby PA-C (Physician Assistant)       PAIN CLINIC PROGRESS NOTE    Subjective:   Keshia Arellano is a 58 y.o. female who presents for evaluation of low back pain.  She has history of lumbar arachnoiditis.  She has had numerous lumbar epidural injections in the past; worried to do repeat injections due to concern of worsening her arachnoiditis.  She has been educated on possible lumbar facet treatments or medial branch blocks to treat axial low back pain. She has other multisite pain including pelvic pain, cervicalgia, TMJ, myalgias.    Major issues:  1. Chronic pain syndrome    2. Arachnoiditis    3. Cervical Disc Degeneration    4. Arthralgias In Multiple Sites       Pain Score: 7/10 constant aching pain in low back, pelvis, legs, neck, shoulders, ribs. Function rated 8.  Pain at best is 5/10, at worst is 10/10, and on average is 7/10.   Gait disturbance: positive. Ambulating with cane, denies recent falls.  Exacerbating factors: Activity, PT, daily life, standing, walking  Alleviating factors: rest, medications,  PT, sleep  Adverse effects of medications:  Sedation, spaced out, foggy tired with medical cannabis.  Associated symptoms: Denies numbness.  Reports weakness in left hip, left pelvis, left leg.  Pain at night.  She denies bowel bladder incontinence (has urinary urgency), unexplained weight loss, fever/chills (reports cold intolerance).   Functional symptoms: Pain interferes with sleep, walking, work, activities of daily living, relationships/social, sexual health, mood (depressed, frustrated, helpless/hopeless).  States function has not improved with current pain treatment.  Current treatment efficacy: Fair.  Taking MSER 15 mg TID, MSIR 15 mg TID prn.   "Takes both in the morning and then spaces out doses by 4 hours. States on occasion taking MSER 30 mg dose in the morning if she wakes to take one and then repeats dose within 1-2 hours feels better pain control that way.  States ketamine 10 mg was ineffective.  Current treatment compliance: Good.  Reducing opioid and adjunct medications.  Intermittent therapy with pelvic , continues with home therapy to help with pain flares.     Since last visit the patient denies any chance of being pregnant, any new diagnostic testing, any visits to the ER/urgent care.  She reports she has participated in PT and saw pharmacist at [facility].  She states ketamine 10 mg delphine didn't help her pain.  She did try her ketamine up to 20 mg didn't help even after 45 minutes.  She states at this point her pain was too intense and she used her medical cannabis.  She states she didn't try the ketamine for 3-4 weeks as she was scared of ketamine.      She states she is not getting as much benefit from medical cannabis.  She states she spoke with pharmacist last week.  [place] increase was more helpful; was told she has \"tolerance problem.\"  She was advised to use tangerine 7 puffs and 2 puffs of Dilma wait 5 minutes and then repeat up to 10 puffs.  She states she is keeping track of dose and time so she doesn't lose track of dosing.  Still sedating on current dosing.  She states she is eating less which is a good sign that her pain is better controlled.  Denies nausea or lack of appetite.      She states she likes the oxytocin, as she \"feels more herself.\"  She finds it difficult to dose three times a day due to keeping refrigerated.  She states missing a dose intermittently.  Better than any antidepressant she has tried.  Trying to wean down Zoloft due to side effects.  Asking about increasing the dose.    Reports an episode of severe back spasms which lasted 6 days turning her neck set off spasms in her back.  She states " "that was first trial of ketamine. She used ice, cannabis.  No Baclofen during this time as she weaned off months ago.  Discuss having prn muscle relaxant; she has failed flexeril, tizanidine, skelaxin, robaxin.  Denies norflex but wonders about the metabolism.  Dry needling with PT is helpful; can only do a little spot at a time.  Goes every 2 weeks.  She will ask PT about TPI and Hwave.   She states she is doing her stretching on exercise ball which popped her upper back.  She is asking about a new PM&R physician.      She states she was able to win her case and return to her previous disability benefits.  Reports she paid her  $16,000 which is a major loss of funds towards her medical cannabis program.    Review of Systems  A 12 point ROS was completed and was positive for headache, TMJ, pelvic pain, arthralgias, constipation, low back pain, weakness, depression with anxiety, sleep disturbance, visual disturbance requiring glasses, GERD, weight loss.  Urinary urgency and sometimes urinary incontinence.   Otherwise negative.      Objective:     Vitals:    10/04/18 1458   BP: 129/87   Pulse: 84   Resp: 16   Weight: 196 lb (88.9 kg)   Height: 5' 6\" (1.676 m)   PainSc:   7     Physical Exam  General- patient is alert and oriented, in NAD, well-groomed, well-nourished. Obese, appears stated age.  Presents alone today.    Psych- Judgment and insight normal, AOx4, recent and remote memory normal, mood and affect concerned, depressed.    Respiratory- breathing is non-labored, regular rate and rhythm.  Cardiovascular- extremities warm and well perfused, no peripheral edema or varicosities  Dermatologic - Exposed skin is CDI  Musculoskeletal - Ambulating with single-point cane with slight antalgia on the right.  Able to sit to stand with difficulty.     *Opioid [place] Precautions:    UDS/Swab - Collected 10/04/18, results pending  Opioid Consent - 08/07/18  Opioid Agreement - 08/07/18  Pharmacy- as documented "    [place]  Reviewed - 10/04/18 as expected    Pill Count - 10/05/18 Morphine ER #62 & MSIR 15 mg #41  Psychological evaluation - outside source  MME - 90  Pharmacogenetic testing - yes  KAILASH Score: 64 10/04/18    Imaging:  None new.    Assessment:   Keshia Arellano is a 58 y.o. female seen in clinic today for chronic intractable lower back pain secondary to arachnoiditis that affects her QOL and ADLs.  Symptoms are improved with MSContin and MSIR. She continues medical cannabis and has done opioid reduction to be within CDC guidelines, along with discontinuation of Naproxen, Baclofen, and Ambien.  She does report intermittent spasms and discuss management of this symptom including prn muscle relaxants, TPI, Hwave, dry needling with PT, stretching, etc.  She is interested in Norflex; I review epocrates which states metabolism [id number] largely unknown and I have sent a message to Rola Goff PharmD to assist with this decision making.  She is advised to increase doses of ketamine and oxytocin to see if more helpful for pain. She is tolerating both without side effects.     Plan:   Continue Morphine extended release 15 mg every 8 hours.  If you want to take a 30 mg dose in the morning, you may and then space the remaining dose out to 12 hours to not exceed 3 doses in 24 hours.  Continue Morphine immediate release 15 mg up to 2-3 tablets per day.  As your opioid dose remains stable, I will send electronic refills to the pharmacy for 2 subsequent months until your next appointment so you do not have to call our refill line.  The fill dates for your medications are:  10/17/18 & 11/14/18  Check with your pharmacy that all prescriptions are on your profile. Call the pharmacy a week before you want to fill the prescription as stock may vary and the pharmacy may need to order the medication for you. I reserve the right to cancel the electronic prescription at the pharmacy in between appointments and would contact you with an  explanation if this were to occur.  Continue use of medical cannabis as prescribed   Increase oxytocin to 40 units 3 times a day for chronic pain. New order sent to compounding pharmacy  Also trial ketamine 40 mg dose as needed for severe pain - if helpful, let me know and I will send new order to pharmacy.  If not helpful, discontinue use.   I will reach out to Rola about questions relating to muscle relaxant.  Other options for spasms are trigger point injection, or trial of Hwave treatment.  Will need to establish care with new PM&R physician; I can ask Maida HOUGH or we can look at [place].  Order given today for you to continue PT with [facility]: UDT/SWAB collected today results are pending.  Patient required a random Urine Drug Test due to the need to comply with [facility] Model Policy Guidelines and CDC Guideline for the use of any controlled substances. Reasons for definitive testing include:  -Identify specific medication(s) or drug(s) in a class (e.g. Benzodiazepines, barbiturates, opioids, antidepressants)  -Definitive concentration of medication(s), drug(s), and/or metabolites needed to guide management  -Identify non-prescribed medication or illicit drug use for ongoing safe prescribing of controlled substances  -Identify substances that may present high risk for additive or synergistic interaction with prescription medication (e.g. Alcohol).  Patient is either being considered for or taking a controlled substance. Unexpected findings will be discussed and treatment decision may be adjusted. Testing is being implemented across the board randomly w/o bias related to age, race, gender, socioeconomic status or [employer] affiliation.   Follow-up in 8 weeks with Dolores BOLTON    Greater than 50% of this 45 minute visit spent in face to face discussion of pain symptoms, pain treatments including medications and alternatives to opioids including ketamine, oxytocin.  Review medical cannabis  program.    Dolores Bobby PA-C  [facility]  [address]  [place] [address]  Ph: [phone]  Fax: [phone]
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
## 2021-06-29 NOTE — PROGRESS NOTES
"Progress Notes by Hilaria Watt CMA at 6/16/2020  3:00 PM     Author: Hilaria Watt CMA Service: -- Author Type: Certified Medical Assistant    Filed: 6/16/2020  4:24 PM Date of Service: 6/16/2020  3:00 PM Status: Signed    : Hilaria Watt CMA (Certified Medical Assistant)       Keshia Arellano is a 59 y.o. female who is being evaluated via a billable telephone visit regarding back pain.    The patient has been notified of following:     \"This telephone visit will be conducted via a call between you and your physician/provider. We have found that certain health care needs can be provided without the need for a physical exam.  This service lets us provide the care you need with a short phone conversation.  If a prescription is necessary we can send it directly to your pharmacy.  If lab work is needed we can place an order for that and you can then stop by our lab to have the test done at a later time. Telephone visits are billed at different rates depending on your insurance coverage. During this emergency period, for some insurers they may be billed the same as an in-person visit.  Please reach out to your insurance provider with any questions.\"    Patient has given verbal consent to a Telephone visit? Yes    What phone number would you like to be contacted at? home    Patient would like to receive their  AVS Preference: [place].  Pain score: 5  Constant or intermittent: intermittent  What does your pain feel like: deep, achy, sore, nerve pain  Does the pain interfere with:  Work: no  Walking: yes  Sleep: yes  Daily activities: yes  Relationships: yes  F=4      Hilaria Watt CMA           "
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
## 2021-07-01 ENCOUNTER — HOSPITAL ENCOUNTER (OUTPATIENT)
Dept: PALLIATIVE MEDICINE | Facility: OTHER | Age: 61
Discharge: HOME | End: 2021-07-01
Attending: PHYSICIAN ASSISTANT
Payer: COMMERCIAL

## 2021-07-01 DIAGNOSIS — G89.4 CHRONIC PAIN SYNDROME: ICD-10-CM

## 2021-07-01 DIAGNOSIS — M25.50 PAIN IN JOINT, MULTIPLE SITES: ICD-10-CM

## 2021-07-01 DIAGNOSIS — F11.90 CHRONIC, CONTINUOUS USE OF OPIOIDS: ICD-10-CM

## 2021-07-01 DIAGNOSIS — Z79.899 MEDICAL CANNABIS USE: ICD-10-CM

## 2021-07-01 DIAGNOSIS — G03.9 ARACHNOIDITIS: ICD-10-CM

## 2021-07-01 DIAGNOSIS — N94.9 FEMALE GENITAL SYMPTOMS: ICD-10-CM

## 2021-07-01 ASSESSMENT — MIFFLIN-ST. JEOR: SCORE: 1429.98

## 2021-07-03 NOTE — ADDENDUM NOTE
Addendum Note by Odette Cabello at 1/5/2021  3:00 PM     Author: Odette Cabello Service: -- Author Type: --    Filed: 1/13/2021  6:20 AM Date of Service: 1/5/2021  3:00 PM Status: Signed    : Odette Cabello    Encounter addended by: Odette Cabello on: 1/13/2021  6:20 AM      Actions taken: Charge Capture section accepted

## 2021-07-03 NOTE — ADDENDUM NOTE
Addendum Note by Odette Cabello at 10/13/2020  3:00 PM     Author: Odette Cabello Service: -- Author Type: --    Filed: 10/15/2020  8:48 AM Date of Service: 10/13/2020  3:00 PM Status: Signed    : Odette Cabello    Encounter addended by: Odette Cabello on: 10/15/2020  8:48 AM      Actions taken: Charge Capture section accepted

## 2021-07-03 NOTE — ADDENDUM NOTE
Addendum Note by Viola Lyons RN at 12/11/2020  9:08 AM     Author: Viola Lyons RN Service: -- Author Type: Registered Nurse    Filed: 12/11/2020  9:08 AM Encounter Date: 11/12/2020 Status: Signed    : Viola Lyons RN (Registered Nurse)    Addended by: VIOLA LYONS on: 12/11/2020 09:08 AM        Modules accepted: Orders

## 2021-07-03 NOTE — ADDENDUM NOTE
Addendum Note by Odette Cabello at 6/16/2020  3:00 PM     Author: Odette Cabello Service: -- Author Type: --    Filed: 6/30/2020  1:20 PM Date of Service: 6/16/2020  3:00 PM Status: Signed    : Odette Cabello    Encounter addended by: Odette Cabello on: 6/30/2020  1:20 PM      Actions taken: Charge Capture section accepted

## 2021-07-03 NOTE — ADDENDUM NOTE
Addendum Note by Odette Cabello at 8/11/2020  2:20 PM     Author: Odette Cabello Service: -- Author Type: --    Filed: 8/13/2020  8:13 AM Date of Service: 8/11/2020  2:20 PM Status: Signed    : Odette Cabello    Encounter addended by: Odette Cabello on: 8/13/2020  8:13 AM      Actions taken: Charge Capture section accepted

## 2021-07-03 NOTE — ADDENDUM NOTE
Addendum Note by Merissa Frost CMA at 6/14/2017 11:59 PM     Author: Merissa Frost CMA Service: -- Author Type: Certified Medical Assistant    Filed: 11/21/2017  3:46 PM Date of Service: 6/14/2017 11:59 PM Status: Signed    : Merissa Frost CMA (Certified Medical Assistant)    Encounter addended by: Merissa Frost CMA on: 11/21/2017  3:46 PM<BR>     Actions taken: Charge Capture section accepted

## 2021-07-03 NOTE — ADDENDUM NOTE
Addendum Note by Dolores Bobby PA-C at 12/4/2018  2:58 PM     Author: Dolores Bobby PA-C Service: -- Author Type: Physician Assistant    Filed: 12/9/2018  3:32 PM Date of Service: 12/4/2018  2:58 PM Status: Signed    : Dolores Bobby PA-C (Physician Assistant)    Encounter addended by: Dolores Bobby PA-C on: 12/9/2018  3:32 PM      Actions taken: Sign clinical note

## 2021-07-03 NOTE — ADDENDUM NOTE
Addendum Note by Dolores Bobby PA-C at 11/29/2017 11:59 PM     Author: Dolores Bobby PA-C Service: -- Author Type: Physician Assistant    Filed: 5/10/2018  7:59 AM Date of Service: 11/29/2017 11:59 PM Status: Signed    : Dolores Bobby PA-C (Physician Assistant)    Encounter addended by: Dolores Bobby PA-C on: 5/10/2018  7:59 AM<BR>     Actions taken: Sign clinical note

## 2021-07-03 NOTE — ADDENDUM NOTE
Addendum Note by Ju Brown PA-C at 12/16/2020  9:35 AM     Author: Ju Brown PA-C Service: -- Author Type: Physician Assistant    Filed: 12/16/2020  9:35 AM Encounter Date: 11/12/2020 Status: Signed    : Ju Brown PA-C (Physician Assistant)    Addended by: JU BROWN on: 12/16/2020 09:35 AM        Modules accepted: Orders

## 2021-07-03 NOTE — ADDENDUM NOTE
Addendum Note by Dayanara Walker CMA at 12/4/2018  2:58 PM     Author: Dayanara Walker CMA Service: -- Author Type: Certified Medical Assistant    Filed: 1/3/2019 12:58 PM Date of Service: 12/4/2018  2:58 PM Status: Signed    : Dayanara Walker CMA (Certified Medical Assistant)    Encounter addended by: Dayanara Walker CMA on: 1/3/2019 12:58 PM      Actions taken: Charge Capture section accepted

## 2021-07-04 NOTE — ADDENDUM NOTE
Addendum Note by Odette Cabello at 5/4/2021 12:40 PM     Author: Odette Cabello Service: -- Author Type: --    Filed: 5/6/2021  6:32 AM Date of Service: 5/4/2021 12:40 PM Status: Signed    : Odette Cabello    Encounter addended by: Odette Cabello on: 5/6/2021  6:32 AM      Actions taken: Charge Capture section accepted

## 2021-07-04 NOTE — PATIENT INSTRUCTIONS - HE
Patient Instructions by Dolores Bobby PA-C at 7/1/2021  3:00 PM     Author: Dolores Bobby PA-C Service: -- Author Type: Physician Assistant    Filed: 7/1/2021  3:45 PM Date of Service: 7/1/2021  3:00 PM Status: Signed    : Dolores Bobby PA-C (Physician Assistant)       Continue Morphine extended release 15 mg every 8 hours. PA approved until 03/17/22  Continue Morphine immediate release 15 mg up to 2-3 tablets per day.    Refills sent for 07/9/21 to start on 07/11/21 & 08/06/21 to start on 08/08/21  Check with your pharmacy that all prescriptions are on your profile. Call the pharmacy a week before you want to fill the prescription as stock may vary and the pharmacy may need to order the medication for you. I reserve the right to cancel the electronic prescription at the pharmacy in between appointments and would contact you with an explanation if this were to occur.  Continue use of medical cannabis as prescribed.   Registration done today, please complete your portion by 08/01/21  Continue PT as scheduled monthly  Continue with psychiatrist Dr. Ortiz and medication follow-up on recent change.  Follow-up in 9 weeks with Dolores delaney

## 2021-07-04 NOTE — ADDENDUM NOTE
Addendum Note by Odette Cabello at 3/2/2021  3:00 PM     Author: Odette Cabello Service: -- Author Type: --    Filed: 3/16/2021 10:17 AM Date of Service: 3/2/2021  3:00 PM Status: Signed    : Odette Cabello    Encounter addended by: Odette Cabello on: 3/16/2021 10:17 AM      Actions taken: Charge Capture section accepted
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
## 2021-07-05 NOTE — PROGRESS NOTES
Progress Notes by Hilaria Watt CMA at 7/1/2021  3:00 PM     Author: Hilaria Watt CMA Service: -- Author Type: Certified Medical Assistant    Filed: 7/5/2021 10:41 AM Date of Service: 7/1/2021  3:00 PM Status: Signed    : Hilaria Watt CMA (Certified Medical Assistant)       Patient presents to the clinic today for a follow up with Dolores Bobby PA-C.    Pain score: 6  What does your pain feel like: constant, sore, aching, deep  Does the pain interfere with:  Work: yes  Walking/distance: yes  Sleep: yes  Daily activities: yes  Relationships/social life: yes  Mood: yes  F= 8

## 2021-07-06 VITALS — BODY MASS INDEX: 30.05 KG/M2 | WEIGHT: 187 LBS | HEIGHT: 66 IN
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
## 2021-08-31 ENCOUNTER — VIRTUAL VISIT (OUTPATIENT)
Dept: PALLIATIVE MEDICINE | Facility: OTHER | Age: 61
End: 2021-08-31
Payer: COMMERCIAL

## 2021-08-31 VITALS — HEART RATE: 59 BPM | SYSTOLIC BLOOD PRESSURE: 109 MMHG | DIASTOLIC BLOOD PRESSURE: 67 MMHG

## 2021-08-31 DIAGNOSIS — F11.90 CHRONIC, CONTINUOUS USE OF OPIOIDS: ICD-10-CM

## 2021-08-31 DIAGNOSIS — G03.9 ADHESIVE ARACHNOIDITIS: ICD-10-CM

## 2021-08-31 DIAGNOSIS — Z79.899 MEDICAL CANNABIS USE: ICD-10-CM

## 2021-08-31 DIAGNOSIS — G89.4 CHRONIC PAIN SYNDROME: Primary | ICD-10-CM

## 2021-08-31 PROCEDURE — 999N001193 HC VIDEO/TELEPHONE VISIT; NO CHARGE: Performed by: PHYSICIAN ASSISTANT

## 2021-08-31 PROCEDURE — 99207 PR CDG-CODE CATEGORY CHANGED: CPT | Performed by: PHYSICIAN ASSISTANT

## 2021-08-31 PROCEDURE — 99214 OFFICE O/P EST MOD 30 MIN: CPT | Mod: 95 | Performed by: PHYSICIAN ASSISTANT

## 2021-08-31 RX ORDER — ESCITALOPRAM OXALATE 10 MG/1
10 TABLET ORAL DAILY
COMMUNITY
Start: 2021-06-11 | End: 2022-07-01

## 2021-08-31 RX ORDER — MORPHINE SULFATE 15 MG/1
15 TABLET ORAL EVERY 8 HOURS PRN
Qty: 84 TABLET | Refills: 0 | Status: SHIPPED | OUTPATIENT
Start: 2021-09-05 | End: 2022-04-20

## 2021-08-31 RX ORDER — ATORVASTATIN CALCIUM 20 MG/1
20 TABLET, FILM COATED ORAL AT BEDTIME
COMMUNITY
Start: 2020-01-10

## 2021-08-31 RX ORDER — MORPHINE SULFATE 15 MG/1
15 TABLET ORAL
COMMUNITY
Start: 2021-07-11 | End: 2021-08-31

## 2021-08-31 RX ORDER — MORPHINE SULFATE 15 MG/1
15 TABLET, FILM COATED, EXTENDED RELEASE ORAL
COMMUNITY
Start: 2021-07-11 | End: 2021-08-31

## 2021-08-31 RX ORDER — MORPHINE SULFATE 15 MG/1
15 TABLET ORAL EVERY 8 HOURS PRN
Qty: 84 TABLET | Refills: 0 | Status: SHIPPED | OUTPATIENT
Start: 2021-10-03 | End: 2021-10-28

## 2021-08-31 RX ORDER — METOPROLOL SUCCINATE 25 MG/1
25 TABLET, EXTENDED RELEASE ORAL
COMMUNITY
Start: 2021-05-20

## 2021-08-31 RX ORDER — MORPHINE SULFATE 15 MG/1
15 TABLET, FILM COATED, EXTENDED RELEASE ORAL 3 TIMES DAILY PRN
Qty: 84 TABLET | Refills: 0 | Status: SHIPPED | OUTPATIENT
Start: 2021-09-05 | End: 2022-01-07

## 2021-08-31 RX ORDER — MORPHINE SULFATE 15 MG/1
15 TABLET, FILM COATED, EXTENDED RELEASE ORAL 3 TIMES DAILY PRN
Qty: 84 TABLET | Refills: 0 | Status: SHIPPED | OUTPATIENT
Start: 2021-10-03 | End: 2021-10-28

## 2021-08-31 ASSESSMENT — PAIN SCALES - GENERAL: PAINLEVEL: SEVERE PAIN (6)
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
## 2021-08-31 NOTE — PROGRESS NOTES
Keshia Arellano is a 59 y.o. female who is being evaluated via a billable telephone visit.       What phone number would you like to be contacted at? [phone]  Patient would like to receive their AVS by AVS Preference: Jeison.     Pain score: 6  What does your pain feel like: constant, sore, aching, deep  Does the pain interfere with:  Work: yes  Walking/distance: yes  Sleep: yes  Daily activities: yes  Relationships/social life: yes  Mood: yes  F= 8    Pt reports the following counts:    Morphine 15 IR: 17 tabs  Morphine 15 ER: 22 tabs     Hilaria DANIELSON CMA  2:47 PM  8/31/2021

## 2021-09-04 ENCOUNTER — HEALTH MAINTENANCE LETTER (OUTPATIENT)
Age: 61
End: 2021-09-04

## 2021-10-05 NOTE — TELEPHONE ENCOUNTER
Patient would like communication of their results via:          Cell Phone:   Telephone Information:   Mobile [phone]     Okay to leave a message containing results? Yes     Telephone Encounter by Rosaline Yan at 1/8/2019 10:56 AM     Author: Rosaline Yan Service: -- Author Type: --    Filed: 1/8/2019 10:57 AM Encounter Date: 1/7/2019 Status: Signed    : Rosaline Yan       PA approved 1/1/19-1/7/2020 pharmacy and patient are aware

## 2021-10-28 ENCOUNTER — OFFICE VISIT (OUTPATIENT)
Dept: PALLIATIVE MEDICINE | Facility: OTHER | Age: 61
End: 2021-10-28
Payer: COMMERCIAL

## 2021-10-28 VITALS — HEART RATE: 103 BPM | DIASTOLIC BLOOD PRESSURE: 82 MMHG | SYSTOLIC BLOOD PRESSURE: 154 MMHG

## 2021-10-28 DIAGNOSIS — G89.4 CHRONIC PAIN SYNDROME: Primary | ICD-10-CM

## 2021-10-28 DIAGNOSIS — Z79.899 MEDICAL CANNABIS USE: ICD-10-CM

## 2021-10-28 DIAGNOSIS — G03.9 ADHESIVE ARACHNOIDITIS: ICD-10-CM

## 2021-10-28 DIAGNOSIS — G43.009 MIGRAINE WITHOUT AURA AND WITHOUT STATUS MIGRAINOSUS, NOT INTRACTABLE: ICD-10-CM

## 2021-10-28 DIAGNOSIS — F11.90 CHRONIC, CONTINUOUS USE OF OPIOIDS: ICD-10-CM

## 2021-10-28 PROCEDURE — 99213 OFFICE O/P EST LOW 20 MIN: CPT | Performed by: PHYSICIAN ASSISTANT

## 2021-10-28 PROCEDURE — G0463 HOSPITAL OUTPT CLINIC VISIT: HCPCS

## 2021-10-28 RX ORDER — ZOLPIDEM TARTRATE 10 MG/1
TABLET ORAL
COMMUNITY
Start: 2021-10-25

## 2021-10-28 RX ORDER — MULTIVIT WITH MINERALS/LUTEIN
1000 TABLET ORAL 2 TIMES DAILY
COMMUNITY

## 2021-10-28 RX ORDER — ESCITALOPRAM OXALATE 5 MG/1
2.5 TABLET ORAL DAILY
COMMUNITY
Start: 2021-09-20 | End: 2022-01-07

## 2021-10-28 RX ORDER — MORPHINE SULFATE 15 MG/1
15 TABLET, FILM COATED, EXTENDED RELEASE ORAL 3 TIMES DAILY PRN
Qty: 78 TABLET | Refills: 0 | Status: SHIPPED | OUTPATIENT
Start: 2021-10-31 | End: 2021-11-22

## 2021-10-28 RX ORDER — MORPHINE SULFATE 15 MG/1
15 TABLET ORAL EVERY 8 HOURS PRN
Qty: 84 TABLET | Refills: 0 | Status: SHIPPED | OUTPATIENT
Start: 2021-10-31 | End: 2021-11-22

## 2021-10-28 RX ORDER — ONDANSETRON 8 MG/1
8 TABLET, ORALLY DISINTEGRATING ORAL EVERY 8 HOURS PRN
COMMUNITY
Start: 2021-10-27

## 2021-10-28 ASSESSMENT — PAIN SCALES - GENERAL: PAINLEVEL: MODERATE PAIN (5)

## 2021-10-28 NOTE — PATIENT INSTRUCTIONS
Continue Morphine extended release 15 mg every 8 hours. PA approved until 03/17/22.  Refill is for #78 tabs/28 days as you still have #6 remaining from last refill.  Continue Morphine immediate release 15 mg up to 2-3 tablets per day.    Refills sent for 10/28/21 to start on 10/31/21   Check with your pharmacy that all prescriptions are on your profile. Call the pharmacy a week before you want to fill the prescription as stock may vary and the pharmacy may need to order the medication for you. I reserve the right to cancel the electronic prescription at the pharmacy in between appointments and would contact you with an explanation if this were to occur.  Continue use of medical cannabis as prescribed.     Continue PT as scheduled monthly  Continue with psychiatrist Dr. Ortiz and medication follow-up on recent change.  See [facility] as scheduled for concerns about [facility].    Will check with pharmacist about abortive migraine medications that are ok with pharmacogenetic testing and notify you through [employer].  Can check with PCP about functional medicine provider within the primary care offices.  Diagnostics: UDT/Blood collected today results are pending.  Patient required a random Drug Test due to the need to comply with [place] [employer] Policy Guidelines and CDC Guideline for the use of any controlled substances. Reasons for definitive testing include:  -Identify specific medication(s) or drug(s) in a class (e.g. Benzodiazepines, barbiturates, opioids, antidepressants)  -Definitive concentration of medication(s), drug(s), and/or metabolites needed to guide management  -Identify non-prescribed medication or illicit drug use for ongoing safe prescribing of controlled substances  -Identify substances that may present high risk for additive or synergistic interaction with prescription medication (e.g. Alcohol).  Patient is either being considered for or taking a controlled substance. Unexpected findings will be discussed and  treatment decision may be adjusted. Testing is being implemented w/o bias related to age, race, gender, socioeconomic status or Church affiliation.   Test results will be available through [employer] approximately 1 week from collection date.    Opioid agreement signed today and copy was offered.  Discussed bringing these copies and/or your most recent After Visit Summary (AVS) from our appointment to the pharmacy when you are refilling controlled medications to assist with any additional information the pharmacist may require.    Follow-up in 8 weeks with Dolores delaney

## 2021-10-28 NOTE — PROGRESS NOTES
Pt is being seen today by JASON Reyes, for refills, UDT/CSA and f/u of back pain.    Pain score 5  intermittent   What does your pain like feel during a flare? Achy, sore, deep   Does the pain interfere with:  Work ----- NA  Walking ------ yes  Sleep ------- yes  Daily activities ------yes  Relationships -------yes  F=7    UDT/CSA 10/2020     MSIR at 1300 this pm,   pt uses medical cannabis    MSER at 1130 this am
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
## 2021-10-29 LAB
CANNABINOIDS UR QL SCN: ABNORMAL
CREAT UR-MCNC: 148 MG/DL

## 2021-10-29 PROCEDURE — 80307 DRUG TEST PRSMV CHEM ANLYZR: CPT | Performed by: PHYSICIAN ASSISTANT

## 2021-10-29 PROCEDURE — 80320 DRUG SCREEN QUANTALCOHOLS: CPT | Performed by: PHYSICIAN ASSISTANT

## 2021-10-29 PROCEDURE — 80349 CANNABINOIDS NATURAL: CPT | Performed by: PHYSICIAN ASSISTANT

## 2021-10-29 PROCEDURE — 82570 ASSAY OF URINE CREATININE: CPT | Performed by: PHYSICIAN ASSISTANT

## 2021-10-30 ENCOUNTER — HEALTH MAINTENANCE LETTER (OUTPATIENT)
Age: 61
End: 2021-10-30

## 2021-10-30 LAB
BARBITURATES UR QL: NORMAL
ETHANOL UR QL SCN: NORMAL

## 2021-11-02 DIAGNOSIS — G43.009 MIGRAINE WITHOUT AURA AND WITHOUT STATUS MIGRAINOSUS, NOT INTRACTABLE: Primary | ICD-10-CM

## 2021-11-02 LAB
HYDROMORPHONE UR CFM-MCNC: 636 NG/ML
HYDROMORPHONE/CREAT UR: 430 NG/MG {CREAT}
MORPHINE UR CFM-MCNC: >7000 NG/ML
MORPHINE/CREAT UR: ABNORMAL

## 2021-11-02 RX ORDER — SUMATRIPTAN 25 MG/1
25 TABLET, FILM COATED ORAL
Qty: 10 TABLET | Refills: 0 | Status: SHIPPED | OUTPATIENT
Start: 2021-11-02

## 2021-11-03 LAB
CANNABINOIDS UR CFM-MCNC: 1566 NG/ML
CARBOXYTHC/CREAT UR: 1058 NG/MG CREAT

## 2021-11-04 NOTE — PATIENT INSTRUCTIONS - HE
Continue Morphine extended release 15 mg every 8 hours.   Continue Morphine immediate release 15 mg up to 2-3 tablets per day.  Refill sent for 19  Due to changes in [place] laws, effective 2019, prescriptions for any opioid pain relievers can only be filled for 30 days from the date the prescription was written, or for medications that can be refilled, any refills must be filled within 30 days of your last fill. If you do not fill within 30 days, the prescription will  and you will need a brand new prescription.   In order to provide you with continued access to your prescriptions, we will be switching your prescriptions to a 28 day supply or less. It is your responsibility to get your prescriptions filled before the 30 day expiration date. Failure to do so may cause delays in getting your medications.  Check with your pharmacy that all prescriptions are on your profile. Call the pharmacy a week before you want to fill the prescription as stock may vary and the pharmacy may need to order the medication for you. I reserve the right to cancel the electronic prescription at the pharmacy in between appointments and would contact you with an explanation if this were to occur.  Please call our phone # [phone] and choose option #4 to request a medication refill seven days in advance for your second month opioid prescription refill to be sent electronically to your pharmacy.  We will not return a phone call when the refill is sent to your pharmacy, but expect you to communicate with your pharmacy to ensure it is received and ready by the date needed.  Continue use of medical cannabis as prescribed.    Discontinue oxytocin 40 units 3 times a day for chronic pain   Continue PT/dry needling with [facility] as scheduled  Follow-up in 8 weeks with Dolores BOLTON    
LMP: 10/10/21

## 2021-11-05 LAB
ETHANOL UR QL CFM: NEGATIVE
ETHYL GLUCURONIDE UR QL SCN: NOT DETECTED NG/MG CREAT
ETHYL SULFATE/CREAT UR: NOT DETECTED NG/MG CREAT
LEVEL OF DETECTION (ETHANOL): NORMAL

## 2021-11-22 DIAGNOSIS — G89.4 CHRONIC PAIN SYNDROME: ICD-10-CM

## 2021-11-22 RX ORDER — MORPHINE SULFATE 15 MG/1
15 TABLET, FILM COATED, EXTENDED RELEASE ORAL 3 TIMES DAILY PRN
Qty: 84 TABLET | Refills: 0 | Status: SHIPPED | OUTPATIENT
Start: 2021-11-28 | End: 2021-12-16

## 2021-11-22 RX ORDER — MORPHINE SULFATE 15 MG/1
15 TABLET ORAL EVERY 8 HOURS PRN
Qty: 84 TABLET | Refills: 0 | Status: SHIPPED | OUTPATIENT
Start: 2021-11-28 | End: 2021-12-16

## 2021-11-22 NOTE — TELEPHONE ENCOUNTER
Received call from patient requesting refill(s) of Morphine IR and Morphine ER    Last dispensed from pharmacy on 10/28/21    Patient's last office/virtual visit by prescribing provider on 10/28/21 RW  Next office/virtual appointment scheduled for 1/6/21 RW (this will need to be changed)    Last urine drug screen date 10/2021  Current opioid agreement on file (completed within the last year) Yes Date of opioid agreement: 10/2021    E-prescribe to [facility] pharmacy  Pending Prescriptions:                       Disp   Refills    morphine (MSIR) 15 MG IR tablet           84 tab*0            Sig: Take 1 tablet (15 mg) by mouth every 8 hours as           needed for severe pain    morphine (MS CONTIN) 15 MG CR tablet      78 tab*0            Sig: Take 1 tablet (15 mg) by mouth 3 times daily as           needed for moderate to severe pain

## 2021-11-23 NOTE — TELEPHONE ENCOUNTER
Reviewed, approved for 11/28/21.  Patient on medical cannabis, stable medication plan, MME 90.  PCP is Dr. Stephany Duron with [facility].  Will need to r/s with any provider.

## 2021-12-16 DIAGNOSIS — G89.4 CHRONIC PAIN SYNDROME: ICD-10-CM

## 2021-12-16 RX ORDER — MORPHINE SULFATE 15 MG/1
15 TABLET ORAL EVERY 8 HOURS PRN
Qty: 84 TABLET | Refills: 0 | Status: SHIPPED | OUTPATIENT
Start: 2021-12-24 | End: 2022-01-07

## 2021-12-16 RX ORDER — MORPHINE SULFATE 15 MG/1
15 TABLET, FILM COATED, EXTENDED RELEASE ORAL 3 TIMES DAILY PRN
Qty: 84 TABLET | Refills: 0 | Status: SHIPPED | OUTPATIENT
Start: 2021-12-24 | End: 2022-01-07

## 2021-12-16 NOTE — TELEPHONE ENCOUNTER
Received call from patient requesting refill(s) of Morphine Ir and Morphine Er     Last dispensed from pharmacy on Morphine Ir at 11/26/2021 and Morphine Er on 11/26/2021    Patient's last office/virtual visit by prescribing provider on 10/28/2021  Next office/virtual appointment scheduled for 1/7/2022    Last urine drug screen date 10/2021  Current opioid agreement on file (completed within the last year) Yes Date of opioid agreement: 10/2021    E-prescribe to [facility] pharmacy    Will route to nursing [place] for review and preparation of prescription(s).

## 2022-01-07 ENCOUNTER — OFFICE VISIT (OUTPATIENT)
Dept: PALLIATIVE MEDICINE | Facility: OTHER | Age: 62
End: 2022-01-07
Payer: COMMERCIAL

## 2022-01-07 VITALS
DIASTOLIC BLOOD PRESSURE: 92 MMHG | WEIGHT: 184.1 LBS | SYSTOLIC BLOOD PRESSURE: 172 MMHG | BODY MASS INDEX: 29.59 KG/M2 | HEIGHT: 66 IN | HEART RATE: 67 BPM

## 2022-01-07 DIAGNOSIS — G89.4 CHRONIC PAIN SYNDROME: ICD-10-CM

## 2022-01-07 PROCEDURE — G0463 HOSPITAL OUTPT CLINIC VISIT: HCPCS

## 2022-01-07 PROCEDURE — 99215 OFFICE O/P EST HI 40 MIN: CPT | Performed by: ANESTHESIOLOGY

## 2022-01-07 RX ORDER — POLYETHYLENE GLYCOL 3350 17 G/17G
POWDER, FOR SOLUTION ORAL
COMMUNITY
Start: 2021-11-15

## 2022-01-07 RX ORDER — PROCHLORPERAZINE MALEATE 5 MG
TABLET ORAL
COMMUNITY
Start: 2021-11-16

## 2022-01-07 RX ORDER — FAMOTIDINE 40 MG/1
40 TABLET, FILM COATED ORAL 2 TIMES DAILY
COMMUNITY
Start: 2021-11-22

## 2022-01-07 RX ORDER — MORPHINE SULFATE 15 MG/1
15 TABLET ORAL EVERY 8 HOURS PRN
Qty: 84 TABLET | Refills: 0 | Status: SHIPPED | OUTPATIENT
Start: 2022-01-23 | End: 2022-02-14

## 2022-01-07 RX ORDER — MAGNESIUM GLYCINATE 100 MG
TABLET ORAL
COMMUNITY
Start: 2021-11-15 | End: 2024-08-13

## 2022-01-07 RX ORDER — MORPHINE SULFATE 15 MG/1
15 TABLET, FILM COATED, EXTENDED RELEASE ORAL 3 TIMES DAILY PRN
Qty: 84 TABLET | Refills: 0 | Status: SHIPPED | OUTPATIENT
Start: 2022-01-23 | End: 2022-02-14

## 2022-01-07 ASSESSMENT — PAIN SCALES - GENERAL: PAINLEVEL: MODERATE PAIN (5)

## 2022-01-07 ASSESSMENT — MIFFLIN-ST. JEOR: SCORE: 1411.82

## 2022-01-07 NOTE — PROGRESS NOTES
PEG Score 1/7/2022   PEG Total Score 8.33     [facility] Number:  [phone]    Call with any questions about your care and for scheduling assistance.     Calls are returned Monday through Friday between 8 AM and 4:30 PM. We usually get back to you within 2 business days depending on the issue/request.    If we are prescribing your medications:    For opioid medication refills, call the clinic or send a Maptia message 7 days in advance.  Please include:    Name of requested medication    Name of the pharmacy.    For non-opioid medications, call your pharmacy directly to request a refill. Please allow 3-4 days to be processed.     Per [place] State Law:    All controlled substance prescriptions must be filled within 30 days of being written.      For those controlled substances allowing refills, pickup must occur within 30 days of last fill.      We believe regular attendance is key to your success in our program!      Any time you are unable to keep your appointment we ask that you call us at least 24 hours in advance to cancel.This will allow us to offer the appointment time to another patient.     Multiple missed appointments may lead to dismissal from the clinic.
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
## 2022-01-07 NOTE — PATIENT INSTRUCTIONS
PLAN:  I discussed with the medical cannabis using different preparations with higher ratio of CBD to THC.    Continue with the MS Contin 15 mg 3 times a day and the morphine 15 mg immediate release 3 times a day     Discuss  frequency specific microcurrent as a modality to help with the adhesions.  May contact these providers to see if it takes your insurance including [facility] [phone], [facility] [phone] or [facility] [phone]    Follow-up with Dr. Pearson in 3 months

## 2022-01-07 NOTE — PROGRESS NOTES
01/07/22 0966   PEG: A Thee-Item Scale Assessing Pain Intensity and Interference        0 = No pain / No interference    10 = Pain as bad as you can imagine / Completely interferes   What number best describes your pain on average in the past week? 5   What number best describes how, during the past week, pain has interfered with your enjoyment of life? 10   What number best describes how, during the past week, pain has interfered with your general activity? 10   PEG Total Score 8.33
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
## 2022-02-01 NOTE — PROGRESS NOTES
"Progress Notes by Hilaria Watt CMA at 4/21/2020  2:40 PM     Author: Hilaria Watt CMA Service: -- Author Type: Certified Medical Assistant    Filed: 4/21/2020  3:19 PM Date of Service: 4/21/2020  2:40 PM Status: Signed    : Hilaria Watt CMA (Certified Medical Assistant)       Keshia Arellano is a 59 y.o. female who is being evaluated via a billable telephone visit regarding back and insomnia. Patient describes her pain as deep ache. Patient's everyday functions effected by pain include: relationships/social, walking, activities of daily living and mood.    The patient has been notified of following:     \"This telephone visit will be conducted via a call between you and your physician/provider. We have found that certain health care needs can be provided without the need for a physical exam.  This service lets us provide the care you need with a short phone conversation.  If a prescription is necessary we can send it directly to your pharmacy.  If lab work is needed we can place an order for that and you can then stop by our lab to have the test done at a later time. Telephone visits are billed at different rates depending on your insurance coverage. During this emergency period, for some insurers they may be billed the same as an in-person visit.  Please reach out to your insurance provider with any questions.\"    Patient has given verbal consent to a Telephone visit? Yes    Patient would like to receive their AVS by My Chart.      Hilaria Watt CMA           " Left message stating ok for verbal orders

## 2022-02-14 DIAGNOSIS — G89.4 CHRONIC PAIN SYNDROME: ICD-10-CM

## 2022-02-14 RX ORDER — MORPHINE SULFATE 15 MG/1
15 TABLET, FILM COATED, EXTENDED RELEASE ORAL 3 TIMES DAILY PRN
Qty: 84 TABLET | Refills: 0 | Status: SHIPPED | OUTPATIENT
Start: 2022-02-22 | End: 2022-03-15

## 2022-02-14 RX ORDER — MORPHINE SULFATE 15 MG/1
15 TABLET ORAL EVERY 8 HOURS PRN
Qty: 84 TABLET | Refills: 0 | Status: SHIPPED | OUTPATIENT
Start: 2022-02-22 | End: 2022-03-15

## 2022-02-14 NOTE — TELEPHONE ENCOUNTER
Refill request:   Morphine IR  1/20/22  Morphine ER 1/20/22  UDT/CSA 10/2021   Last apt with BE: 1/7/22    Pending Prescriptions:                       Disp   Refills    morphine (MSIR) 15 MG IR tablet           84 tab*0            Sig: Take 1 tablet (15 mg) by mouth every 8 hours as           needed for severe pain    morphine (MS CONTIN) 15 MG CR tablet      84 tab*0            Sig: Take 1 tablet (15 mg) by mouth 3 times daily as           needed for moderate to severe pain    Tri

## 2022-02-19 ENCOUNTER — HEALTH MAINTENANCE LETTER (OUTPATIENT)
Age: 62
End: 2022-02-19

## 2022-03-14 ENCOUNTER — TELEPHONE (OUTPATIENT)
Dept: PALLIATIVE MEDICINE | Facility: OTHER | Age: 62
End: 2022-03-14
Payer: COMMERCIAL

## 2022-03-14 DIAGNOSIS — G89.4 CHRONIC PAIN SYNDROME: ICD-10-CM

## 2022-03-15 RX ORDER — MORPHINE SULFATE 15 MG/1
15 TABLET, FILM COATED, EXTENDED RELEASE ORAL 3 TIMES DAILY PRN
Qty: 90 TABLET | Refills: 0 | Status: SHIPPED | OUTPATIENT
Start: 2022-03-20 | End: 2022-04-01

## 2022-03-15 RX ORDER — MORPHINE SULFATE 15 MG/1
15 TABLET ORAL EVERY 8 HOURS PRN
Qty: 90 TABLET | Refills: 0 | Status: SHIPPED | OUTPATIENT
Start: 2022-03-20 | End: 2022-04-01

## 2022-03-15 NOTE — TELEPHONE ENCOUNTER
Received call from patient requesting refill(s) of MSER and MSIR    Last dispensed from pharmacy: both were dispensed 2/20 for 28 days- according to the outside med list    Patient's last office/virtual visit by prescribing provider on 1/7  Next office/virtual appointment scheduled for 4/1    Last urine drug screen date 10/2021  Current opioid agreement on file (completed within the last year) Yes Date of opioid agreement: 10/2021    E-prescribe to [facility] pharmacy  Pending Prescriptions:                       Disp   Refills    morphine (MSIR) 15 MG IR tablet           90 tab*0            Sig: Take 1 tablet (15 mg) by mouth every 8 hours as           needed for severe pain May fill 3/18 for use           3/20.  This is a 30 day prescription    morphine (MS CONTIN) 15 MG CR tablet      90 tab*0            Sig: Take 1 tablet (15 mg) by mouth 3 times daily as           needed for moderate to severe pain Fill 3/18 for           use 3/20. This is a 30 day prescription     Scripts changed from 28 day to 30 day

## 2022-03-15 NOTE — TELEPHONE ENCOUNTER
and last ov notes reviewed, refills approved. Update to 30 day script noted.    Diaz Landeros DO  [facility]

## 2022-03-22 ENCOUNTER — TELEPHONE (OUTPATIENT)
Dept: PALLIATIVE MEDICINE | Facility: CLINIC | Age: 62
End: 2022-03-22
Payer: COMMERCIAL

## 2022-03-22 NOTE — TELEPHONE ENCOUNTER
Prior Authorization Retail Medication Request    Medication/Dose: morphine (MS CONTIN) 15 MG CR tablet  ICD code (if different than what is on RX):    Previously Tried and Failed:    Rationale:      Insurance Name:  [employer]  Insurance ID:  [id number]       Pharmacy Information (if different than what is on RX)    [id number] - [place], [place] - [address] AT [place] [address] [address] [place] [address]  Phone: [phone] Fax: [phone]    Will Route to [facility]O Zabala Team.      Susy Gibson MA  [facility] [place]

## 2022-03-24 NOTE — TELEPHONE ENCOUNTER
Central Prior Authorization Team   Phone: [phone]      PA Initiation    Medication: morphine (MS CONTIN) 15 MG CR tablet--INITIATED  Insurance Company: [employer] - Phone [phone] Fax [phone]  Pharmacy Filling the Rx: [id number] [address] AT [facility]  Filling Pharmacy Phone: [phone]  Filling Pharmacy Fax:    Start Date: 3/24/2022

## 2022-03-25 NOTE — TELEPHONE ENCOUNTER
Central Prior Authorization Team   Phone: [phone]      PRIOR AUTHORIZATION DENIED    Medication: morphine (MS CONTIN) 15 MG CR tablet--DENIED    Denial Date: 3/25/2022    Denial Rational:        Appeal Information:              IF NO APPEAL IS TRIED PLEASE CLOSE ENCOUNTER

## 2022-04-01 ENCOUNTER — OFFICE VISIT (OUTPATIENT)
Dept: PALLIATIVE MEDICINE | Facility: OTHER | Age: 62
End: 2022-04-01
Payer: COMMERCIAL

## 2022-04-01 ENCOUNTER — TELEPHONE (OUTPATIENT)
Dept: PALLIATIVE MEDICINE | Facility: OTHER | Age: 62
End: 2022-04-01
Payer: COMMERCIAL

## 2022-04-01 VITALS
DIASTOLIC BLOOD PRESSURE: 69 MMHG | HEART RATE: 72 BPM | WEIGHT: 203.8 LBS | BODY MASS INDEX: 32.75 KG/M2 | HEIGHT: 66 IN | SYSTOLIC BLOOD PRESSURE: 134 MMHG

## 2022-04-01 DIAGNOSIS — G89.4 CHRONIC PAIN SYNDROME: ICD-10-CM

## 2022-04-01 PROCEDURE — G0463 HOSPITAL OUTPT CLINIC VISIT: HCPCS

## 2022-04-01 PROCEDURE — 99213 OFFICE O/P EST LOW 20 MIN: CPT | Performed by: ANESTHESIOLOGY

## 2022-04-01 RX ORDER — ASPIRIN 81 MG/1
1 TABLET ORAL DAILY
Status: ON HOLD | COMMUNITY
Start: 2022-02-08 | End: 2024-08-16

## 2022-04-01 RX ORDER — MORPHINE SULFATE 15 MG/1
15 TABLET, FILM COATED, EXTENDED RELEASE ORAL 3 TIMES DAILY PRN
Qty: 87 TABLET | Refills: 0 | Status: SHIPPED | OUTPATIENT
Start: 2022-04-16 | End: 2022-04-20

## 2022-04-01 RX ORDER — MORPHINE SULFATE 15 MG/1
15 TABLET ORAL EVERY 8 HOURS PRN
Qty: 90 TABLET | Refills: 0 | Status: SHIPPED | OUTPATIENT
Start: 2022-04-16 | End: 2022-04-20

## 2022-04-01 ASSESSMENT — PAIN SCALES - GENERAL: PAINLEVEL: MODERATE PAIN (5)

## 2022-04-01 NOTE — LETTER
4/1/2022         RE: Keshia Arellano  [address]  [address]  [facility] [address]        Dear Colleague,    Thank you for referring your patient, Keshia Arellano, to the [facility]. Please see a copy of my visit note below.      PEG Score 1/7/2022 4/1/2022   PEG Total Score 8.33 8.67     [facility] Number:  [phone]    Call with any questions about your care and for scheduling assistance.     Calls are returned Monday through Friday between 8 AM and 4:30 PM. We usually get back to you within 2 business days depending on the issue/request.    If we are prescribing your medications:    For opioid medication refills, call the clinic or send a Miyaobabei message 7 days in advance.  Please include:    Name of requested medication    Name of the pharmacy.    For non-opioid medications, call your pharmacy directly to request a refill. Please allow 3-4 days to be processed.     Per [place] State Law:    All controlled substance prescriptions must be filled within 30 days of being written.      For those controlled substances allowing refills, pickup must occur within 30 days of last fill.      We believe regular attendance is key to your success in our program!      Any time you are unable to keep your appointment we ask that you call us at least 24 hours in advance to cancel.This will allow us to offer the appointment time to another patient.     Multiple missed appointments may lead to dismissal from the clinic.    [facility] - Office Visit    ASSESSMENT & PLAN     Keshia was seen today for pain.    Diagnoses and all orders for this visit:    Chronic pain syndrome  -     morphine (MS CONTIN) 15 MG CR tablet; Take 1 tablet (15 mg) by mouth 3 times daily as needed for moderate to severe pain May fill 4/16 for use 4/19 end 5/16/2022.  This is a 30 day prescription  -     morphine (MSIR) 15 MG IR tablet; Take 1 tablet (15 mg) by mouth  every 8 hours as needed for severe pain May fill 4/16 for use 4/19 end 5/16/2022.  This is a 30 day prescription        Patient Instructions   PLAN:  You will continue work with your physical therapist.    Continue the morphine 15 mg immediate release 3 times a day.    Morphine long-acting 15 mg 3 times a day.    Discussed the role of frequency specific microcurrent to help with your hip pain arachnoiditis.  May contact these providers to see if they take your insurance transitions [phone], [facility] [phone], or [facility] [phone].    Discussed the role of acetazolamide for arachnoiditis, you may review that to see if you would like to try    Follow-up with Dr. Chase in 3 months        -----  LULÚ CHASE MD  [facility]       SUBJECTIVE      Keshia Arellano is a 61 year old year old female who presents to clinic today for the following:     Follow-up for arachnoiditis and hip replacement.    She reviews today the glycine that she can please was helping sleep stop working.  She will take a break and try again.    She continues with medical cannabis.  Recalls did try higher CBD which did not help, using the THC and now may try the flood.    She notes the THC seems very if she is in on an antidepressant.    She had found CMP was very helpful for her mood for about a year, then depression came back is not taking Lexapro 10 mg daily.  Her psychiatrist was concerned about adding back for Shawn E due to serotonin syndrome.  Discussed my understanding this and it is more of a methyl donor may be less in the wrist.    She continues seeing her physical therapist doing fascial work, as her thorax can be tight, and dry needling.    Continues with the morphine 15 mg immediate release 3 times a day.  Using the MS Contin 15 mg usually 3 times a day.  Notes occasionally she will sleep through a forget that dose so has 3 extra for this refill and would like to have 3 fewer  in the prescription as she does not want extra round.    Looking forward to the spring where she does not have to worry about slipping on the ice.     reviewed as expected.  Last urine drug test in October    To look into frequency specific microcurrent calling her insurance it was not covered.  We discussed she needs to call the providers directly      Current Outpatient Medications:      Acetylcysteine, Nutrient, 600 MG TABS, 500 mg  by oral route taken 1x a day, Disp: , Rfl:      ACIDOPHILUS OR, 1 tablet daily, Disp: , Rfl:      aspirin 81 MG EC tablet, Take 1 tablet by mouth daily, Disp: , Rfl:      atorvastatin (LIPITOR) 20 MG tablet, Take 20 mg by mouth, Disp: , Rfl:      B Complex Vitamins (B-COMPLEX/B-12) LIQD, , Disp: , Rfl:      co-enzyme Q-10 100 MG CAPS capsule, Take 2 capsules by mouth 3 times daily, Disp: , Rfl:      escitalopram (LEXAPRO) 10 MG tablet, Take 10 mg by mouth daily , Disp: , Rfl:      famotidine (PEPCID) 40 MG tablet, Take 40 mg by mouth 2 times daily, Disp: , Rfl:      fish oil-omega-3 fatty acids (OMEGA-3) 1000 MG capsule, Take 2 g by mouth daily., Disp: , Rfl:      glutamine 500 MG capsule, take 2 tabs in AM, Disp: , Rfl:      GLUTATHIONE PO, Take 500 mg by mouth, Disp: , Rfl:      LISINOPRIL PO, Take 5 mg by mouth daily, Disp: , Rfl:      Magnesium Bisglycinate (MAG GLYCINATE) 100 MG TABS, 200mg by oral route, taken 2-4x a day, Disp: , Rfl:      medical cannabis (Patient's own supply.  Not a prescription), 1 capsule (This is NOT a prescription, and does not certify that the patient has a qualifying medical condition for medical cannabis.  The purpose of this order is  to document that the patient reports taking medical cannabis.)  Vaping; 1-4 puffs inhaled up to 3 times a day as needed Lotion; 1 application, topical as needed, Disp: , Rfl:      melatonin 5 MG sublingual tablet, Place 10 mg under the tongue, Disp: , Rfl:      metoprolol succinate ER (TOPROL-XL) 25 MG 24 hr tablet,  Take 25 mg by mouth, Disp: , Rfl:      [START ON 4/16/2022] morphine (MS CONTIN) 15 MG CR tablet, Take 1 tablet (15 mg) by mouth 3 times daily as needed for moderate to severe pain May fill 4/16 for use 4/19 end 5/16/2022.  This is a 30 day prescription, Disp: 87 tablet, Rfl: 0     [START ON 4/16/2022] morphine (MSIR) 15 MG IR tablet, Take 1 tablet (15 mg) by mouth every 8 hours as needed for severe pain May fill 4/16 for use 4/19 end 5/16/2022.  This is a 30 day prescription, Disp: 90 tablet, Rfl: 0     ondansetron (ZOFRAN-ODT) 8 MG ODT tab, Place 8 mg under the tongue every 8 hours as needed , Disp: , Rfl:      polyethylene glycol (MIRALAX) 17 GM/Dose powder, take (17G)  by oral route  as needed mixed with 8 oz. water, juice, soda, coffee or tea, Disp: , Rfl:      prochlorperazine (COMPAZINE) 5 MG tablet, TAKE 1 TABLET BY MOUTH UP TO EVERY 4 HOURS AS NEEDED FOR NAUSEA OR VOMITING, Disp: , Rfl:      SUMAtriptan (IMITREX) 25 MG tablet, Take 1 tablet (25 mg) by mouth at onset of headache for migraine May repeat in 2 hours. Max 8 tablets/24 hours., Disp: 10 tablet, Rfl: 0     UNABLE TO FIND, Glycine 500 mg, up to 7353-9646 mg orally at HS, Disp: , Rfl:      vitamin C (ASCORBIC ACID) 1000 MG TABS, Take 1,000 mg by mouth daily, Disp: , Rfl:      VITAMIN D, CHOLECALCIFEROL, PO, Take 10,000 Units by mouth daily, Disp: , Rfl:      zolpidem (AMBIEN) 10 MG tablet, , Disp: , Rfl:      metoprolol tartrate (LOPRESSOR) 25 MG tablet, Take 1 tablet (25 mg) by mouth 2 times daily, Disp: 60 tablet, Rfl: 0     morphine (MSIR) 15 MG IR tablet, Take 1 tablet (15 mg) by mouth every 8 hours as needed for severe pain, Disp: 84 tablet, Rfl: 0     UNABLE TO FIND, Take 1,000 mg by mouth 2 times daily, Disp: , Rfl:       Review of Systems   General, psych, musculoskeletal, bowels and bladder otherwise normal other than above.          OBJECTIVE   BP (!) 171/102   Pulse 106   Ht 1.829 m (6')   Wt 103.8 kg (228 lb 14.4 oz)   SpO2 97%   BMI  31.04 kg/m        Physical Exam  General:  Normal appearance, no apparent distress ambulates with cane  Cardiovascular: Normal rate  Lungs: Pulmonary effort is normal, speaking in full sentences  MSK: Nontender in the cervical thorax lumbar.  Wearing her sacroiliac belt which she tends to wear when she goes out.  Negative straight leg raising.  Able to stand on her tiptoes  Skin: Warm and dry. No concerning rashes or lesions.  Neurologic: No focal deficit, alert and oriented x3  Psychiatric: normal mood and affect, cooperative      Assessment: Arachnoiditis, see history of hip replacement, stable on this opioid regimen.  Continues actively involved in physical therapy.    Reviewed calling the providers of the frequency specific microcurrent directly.    Discussed some literature about acetazolamide and arachnoiditis, she will do some research.    Total time more than 20 minutes         04/01/22 1345   PEG: A Thee-Item Scale Assessing Pain Intensity and Interference        0 = No pain / No interference    10 = Pain as bad as you can imagine / Completely interferes   What number best describes your pain on average in the past week? 6   What number best describes how, during the past week, pain has interfered with your enjoyment of life? 10   What number best describes how, during the past week, pain has interfered with your general activity? 10   PEG Total Score 8.67       Again, thank you for allowing me to participate in the care of your patient.        Sincerely,        LULÚ CHASE MD

## 2022-04-01 NOTE — PROGRESS NOTES
04/01/22 6274   PEG: A Thee-Item Scale Assessing Pain Intensity and Interference        0 = No pain / No interference    10 = Pain as bad as you can imagine / Completely interferes   What number best describes your pain on average in the past week? 6   What number best describes how, during the past week, pain has interfered with your enjoyment of life? 10   What number best describes how, during the past week, pain has interfered with your general activity? 10   PEG Total Score 8.67

## 2022-04-01 NOTE — PROGRESS NOTES
[facility] - Office Visit    ASSESSMENT & PLAN     Keshia was seen today for pain.    Diagnoses and all orders for this visit:    Chronic pain syndrome  -     morphine (MS CONTIN) 15 MG CR tablet; Take 1 tablet (15 mg) by mouth 3 times daily as needed for moderate to severe pain May fill 4/16 for use 4/19 end 5/16/2022.  This is a 30 day prescription  -     morphine (MSIR) 15 MG IR tablet; Take 1 tablet (15 mg) by mouth every 8 hours as needed for severe pain May fill 4/16 for use 4/19 end 5/16/2022.  This is a 30 day prescription        Patient Instructions   PLAN:  You will continue work with your physical therapist.    Continue the morphine 15 mg immediate release 3 times a day.    Morphine long-acting 15 mg 3 times a day.    Discussed the role of frequency specific microcurrent to help with your hip pain arachnoiditis.  May contact these providers to see if they take your insurance transitions [phone], [facility] [phone], or [facility] [phone].    Discussed the role of acetazolamide for arachnoiditis, you may review that to see if you would like to try    Follow-up with Dr. Chase in 3 months        -----  LULÚ CHASE MD  [facility]       SUBJECTIVE      Keshia Arellano is a 61 year old year old female who presents to clinic today for the following:     Follow-up for arachnoiditis and hip replacement.    She reviews today the glycine that she can please was helping sleep stop working.  She will take a break and try again.    She continues with medical cannabis.  Recalls did try higher CBD which did not help, using the THC and now may try the flood.    She notes the THC seems very if she is in on an antidepressant.    She had found CMP was very helpful for her mood for about a year, then depression came back is not taking Lexapro 10 mg daily.  Her psychiatrist was concerned about adding back for Shawn E due to serotonin syndrome.   Discussed my understanding this and it is more of a methyl donor may be less in the wrist.    She continues seeing her physical therapist doing fascial work, as her thorax can be tight, and dry needling.    Continues with the morphine 15 mg immediate release 3 times a day.  Using the MS Contin 15 mg usually 3 times a day.  Notes occasionally she will sleep through a forget that dose so has 3 extra for this refill and would like to have 3 fewer in the prescription as she does not want extra round.    Looking forward to the spring where she does not have to worry about slipping on the ice.     reviewed as expected.  Last urine drug test in October    To look into frequency specific microcurrent calling her insurance it was not covered.  We discussed she needs to call the providers directly      Current Outpatient Medications:      Acetylcysteine, Nutrient, 600 MG TABS, 500 mg  by oral route taken 1x a day, Disp: , Rfl:      ACIDOPHILUS OR, 1 tablet daily, Disp: , Rfl:      aspirin 81 MG EC tablet, Take 1 tablet by mouth daily, Disp: , Rfl:      atorvastatin (LIPITOR) 20 MG tablet, Take 20 mg by mouth, Disp: , Rfl:      B Complex Vitamins (B-COMPLEX/B-12) LIQD, , Disp: , Rfl:      co-enzyme Q-10 100 MG CAPS capsule, Take 2 capsules by mouth 3 times daily, Disp: , Rfl:      escitalopram (LEXAPRO) 10 MG tablet, Take 10 mg by mouth daily , Disp: , Rfl:      famotidine (PEPCID) 40 MG tablet, Take 40 mg by mouth 2 times daily, Disp: , Rfl:      fish oil-omega-3 fatty acids (OMEGA-3) 1000 MG capsule, Take 2 g by mouth daily., Disp: , Rfl:      glutamine 500 MG capsule, take 2 tabs in AM, Disp: , Rfl:      GLUTATHIONE PO, Take 500 mg by mouth, Disp: , Rfl:      LISINOPRIL PO, Take 5 mg by mouth daily, Disp: , Rfl:      Magnesium Bisglycinate (MAG GLYCINATE) 100 MG TABS, 200mg by oral route, taken 2-4x a day, Disp: , Rfl:      medical cannabis (Patient's own supply.  Not a prescription), 1 capsule (This is NOT a  prescription, and does not certify that the patient has a qualifying medical condition for medical cannabis.  The purpose of this order is  to document that the patient reports taking medical cannabis.)  Vaping; 1-4 puffs inhaled up to 3 times a day as needed Lotion; 1 application, topical as needed, Disp: , Rfl:      melatonin 5 MG sublingual tablet, Place 10 mg under the tongue, Disp: , Rfl:      metoprolol succinate ER (TOPROL-XL) 25 MG 24 hr tablet, Take 25 mg by mouth, Disp: , Rfl:      [START ON 4/16/2022] morphine (MS CONTIN) 15 MG CR tablet, Take 1 tablet (15 mg) by mouth 3 times daily as needed for moderate to severe pain May fill 4/16 for use 4/19 end 5/16/2022.  This is a 30 day prescription, Disp: 87 tablet, Rfl: 0     [START ON 4/16/2022] morphine (MSIR) 15 MG IR tablet, Take 1 tablet (15 mg) by mouth every 8 hours as needed for severe pain May fill 4/16 for use 4/19 end 5/16/2022.  This is a 30 day prescription, Disp: 90 tablet, Rfl: 0     ondansetron (ZOFRAN-ODT) 8 MG ODT tab, Place 8 mg under the tongue every 8 hours as needed , Disp: , Rfl:      polyethylene glycol (MIRALAX) 17 GM/Dose powder, take (17G)  by oral route  as needed mixed with 8 oz. water, juice, soda, coffee or tea, Disp: , Rfl:      prochlorperazine (COMPAZINE) 5 MG tablet, TAKE 1 TABLET BY MOUTH UP TO EVERY 4 HOURS AS NEEDED FOR NAUSEA OR VOMITING, Disp: , Rfl:      SUMAtriptan (IMITREX) 25 MG tablet, Take 1 tablet (25 mg) by mouth at onset of headache for migraine May repeat in 2 hours. Max 8 tablets/24 hours., Disp: 10 tablet, Rfl: 0     UNABLE TO FIND, Glycine 500 mg, up to 4105-0396 mg orally at HS, Disp: , Rfl:      vitamin C (ASCORBIC ACID) 1000 MG TABS, Take 1,000 mg by mouth daily, Disp: , Rfl:      VITAMIN D, CHOLECALCIFEROL, PO, Take 10,000 Units by mouth daily, Disp: , Rfl:      zolpidem (AMBIEN) 10 MG tablet, , Disp: , Rfl:      metoprolol tartrate (LOPRESSOR) 25 MG tablet, Take 1 tablet (25 mg) by mouth 2 times daily,  Disp: 60 tablet, Rfl: 0     morphine (MSIR) 15 MG IR tablet, Take 1 tablet (15 mg) by mouth every 8 hours as needed for severe pain, Disp: 84 tablet, Rfl: 0     UNABLE TO FIND, Take 1,000 mg by mouth 2 times daily, Disp: , Rfl:       Review of Systems   General, psych, musculoskeletal, bowels and bladder otherwise normal other than above.          OBJECTIVE   BP (!) 171/102   Pulse 106   Ht 1.829 m (6')   Wt 103.8 kg (228 lb 14.4 oz)   SpO2 97%   BMI 31.04 kg/m        Physical Exam  General:  Normal appearance, no apparent distress ambulates with cane  Cardiovascular: Normal rate  Lungs: Pulmonary effort is normal, speaking in full sentences  MSK: Nontender in the cervical thorax lumbar.  Wearing her sacroiliac belt which she tends to wear when she goes out.  Negative straight leg raising.  Able to stand on her tiptoes  Skin: Warm and dry. No concerning rashes or lesions.  Neurologic: No focal deficit, alert and oriented x3  Psychiatric: normal mood and affect, cooperative      Assessment: Arachnoiditis, see history of hip replacement, stable on this opioid regimen.  Continues actively involved in physical therapy.    Reviewed calling the providers of the frequency specific microcurrent directly.    Discussed some literature about acetazolamide and arachnoiditis, she will do some research.    Total time more than 20 minutes

## 2022-04-01 NOTE — TELEPHONE ENCOUNTER
PA Initiation 4/1/2022    Medication: Morphine ER is non formulary  Insurance Company:  [employer]  [facility] Filling [facility]  Filling Pharmacy Phone: [phone]   Filling Pharmacy Fax:  [phone]  Start Date: 9/5/2021

## 2022-04-01 NOTE — PROGRESS NOTES
PEG Score 1/7/2022 4/1/2022   PEG Total Score 8.33 8.67     [facility] Number:  [phone]    Call with any questions about your care and for scheduling assistance.     Calls are returned Monday through Friday between 8 AM and 4:30 PM. We usually get back to you within 2 business days depending on the issue/request.    If we are prescribing your medications:    For opioid medication refills, call the clinic or send a [email or web address] message 7 days in advance.  Please include:    Name of requested medication    Name of the pharmacy.    For non-opioid medications, call your pharmacy directly to request a refill. Please allow 3-4 days to be processed.     Per [place] State Law:    All controlled substance prescriptions must be filled within 30 days of being written.      For those controlled substances allowing refills, pickup must occur within 30 days of last fill.      We believe regular attendance is key to your success in our program!      Any time you are unable to keep your appointment we ask that you call us at least 24 hours in advance to cancel.This will allow us to offer the appointment time to another patient.     Multiple missed appointments may lead to dismissal from the clinic.

## 2022-04-01 NOTE — PATIENT INSTRUCTIONS
PLAN:  You will continue work with your physical therapist.    Continue the morphine 15 mg immediate release 3 times a day.    Morphine long-acting 15 mg 3 times a day.    Discussed the role of frequency specific microcurrent to help with your hip pain arachnoiditis.  May contact these providers to see if they take your insurance transitions [phone], [facility] [phone], or [facility] [phone].    Discussed the role of acetazolamide for arachnoiditis, you may review that to see if you would like to try    Follow-up with Dr. Pearson in 3 months

## 2022-04-20 RX ORDER — MORPHINE SULFATE 15 MG/1
15 TABLET ORAL 4 TIMES DAILY
Qty: 56 TABLET | Refills: 0 | Status: SHIPPED | OUTPATIENT
Start: 2022-05-03 | End: 2022-05-20

## 2022-04-20 NOTE — TELEPHONE ENCOUNTER
Morphine ER was previously denied by insurance but does not appear that an appeal was completed.  Please offer an alternative or appeal

## 2022-04-20 NOTE — TELEPHONE ENCOUNTER
We were attempting a prior authorization for morphine 15 mg extended release 3 times a day.  Records from their insurance indicate they do not approve any long-acting opioids.  I called the patient to see if we would change the 15 mg extended release 3 times a day of 15 mg immediate release 3 times a day to 30 mg 3 times a day.  She would rather try using 15 mg immediate release 4 times a day.  She will start with when she has an Imes we will send in a prescription when would be due to start to see if this is approved

## 2022-05-20 DIAGNOSIS — G89.4 CHRONIC PAIN SYNDROME: ICD-10-CM

## 2022-05-20 RX ORDER — MORPHINE SULFATE 15 MG/1
15 TABLET ORAL 4 TIMES DAILY
Qty: 56 TABLET | Refills: 0 | Status: SHIPPED | OUTPATIENT
Start: 2022-05-20 | End: 2022-06-03

## 2022-05-20 NOTE — TELEPHONE ENCOUNTER
Received call from patient requesting refill   morphine 15 mg IR  Last dispensed from pharmacy on 5/11/22-14 days    Patient's last office/virtual visit by prescribing provider on 4/1/22  Next office/virtual appointment scheduled for 7/1/22    UDT/CSA 10/2021     Pending Prescriptions:                       Disp   Refills    morphine (MSIR) 15 MG IR tablet           56 tab*0            Sig: Take 1 tablet (15 mg) by mouth 4 times daily May           fill 5/23/22 for start on or after 5/25/22    [facility]

## 2022-07-01 ENCOUNTER — TELEPHONE (OUTPATIENT)
Dept: PALLIATIVE MEDICINE | Facility: OTHER | Age: 62
End: 2022-07-01

## 2022-07-01 ENCOUNTER — OFFICE VISIT (OUTPATIENT)
Dept: PALLIATIVE MEDICINE | Facility: OTHER | Age: 62
End: 2022-07-01
Payer: COMMERCIAL

## 2022-07-01 VITALS
HEART RATE: 81 BPM | SYSTOLIC BLOOD PRESSURE: 137 MMHG | DIASTOLIC BLOOD PRESSURE: 65 MMHG | BODY MASS INDEX: 32.44 KG/M2 | WEIGHT: 201 LBS | OXYGEN SATURATION: 96 %

## 2022-07-01 DIAGNOSIS — G89.4 CHRONIC PAIN SYNDROME: ICD-10-CM

## 2022-07-01 PROCEDURE — 99214 OFFICE O/P EST MOD 30 MIN: CPT | Performed by: ANESTHESIOLOGY

## 2022-07-01 RX ORDER — LEVOCARNITINE TARTRATE 500 MG
1000 CAPSULE ORAL
COMMUNITY
End: 2024-08-13

## 2022-07-01 RX ORDER — MORPHINE SULFATE 15 MG/1
15 TABLET ORAL 4 TIMES DAILY
Qty: 112 TABLET | Refills: 0 | Status: SHIPPED | OUTPATIENT
Start: 2022-07-01 | End: 2022-08-09

## 2022-07-01 ASSESSMENT — PAIN SCALES - GENERAL: PAINLEVEL: MODERATE PAIN (5)

## 2022-07-01 NOTE — PATIENT INSTRUCTIONS
PLAN:    Continue the frequency specific microcurrent.  You may contact Emilie Ernandez, email [email or web address], to help obtain a home device if you are interested.    You may add zinc 50 mg daily with a meal to help other antidepressant treatment.    Continue probiotics and SAMe as you are.      May read about the use of acetazolamide for arachnoiditis, if you are interested in a trial contact Dr. Pearson    Continue the coenzyme Q 10.    Continue morphine 15 mg immediate release 4 times a day.    Discussed with changes with staffing at the [facility] pain center, you are to return to your primary care provider.  Reviewed from my experience the [facility] primary care providers and not taking over management of opioids and you may consider [facility] [phone], working with Dr. Sewell who is familiar with functional medicine approaches as well

## 2022-07-01 NOTE — TELEPHONE ENCOUNTER
Patient calls, VM reporting she forgot to discuss at today's apt that she will need her medical cannabis recertified.  She has been able to reduce overall opioid use as well as no longer taking NSAIDS with the introduction of the medical cannabis and would like to continue on this.

## 2022-07-02 NOTE — PROGRESS NOTES
[facility] - Office Visit    ASSESSMENT & PLAN     Keshia was seen today for pain.    Diagnoses and all orders for this visit:    Chronic pain syndrome  -     morphine (MSIR) 15 MG IR tablet; Take 1 tablet (15 mg) by mouth 4 times daily May fill 7/18 for start 7/20        Patient Instructions   PLAN:    Continue the frequency specific microcurrent.  You may contact Emilie Ernandez, email [email or web address], to help obtain a home device if you are interested.    You may add zinc 50 mg daily with a meal to help other antidepressant treatment.    Continue probiotics and SAMe as you are.      May read about the use of acetazolamide for arachnoiditis, if you are interested in a trial contact Dr. Chase    Continue the coenzyme Q 10.    Continue morphine 15 mg immediate release 4 times a day.    Discussed with changes with staffing at the [facility] pain center, you are to return to your primary care provider.  Reviewed from my experience the [facility] primary care providers and not taking over management of opioids and you may consider [facility] at [phone], working with Dr. Sewell who is familiar with functional medicine approaches as well        -----  LULÚ CHASE MD  [facility]       SUBJECTIVE      Keshia Arellano is a 61 year old year old female who presents to clinic today for the following:     Followed for arachnoiditis, I hip replacements.    Reviews she has been doing frequency specific microcurrent having 5 treatments.  While skeptical, has found she has more range of motion in her hip, had had lower leg symptoms of a sense of congestion which feels different.  She would like to continue.  Does not think they have focused on the arachnoiditis yet.    She describes the rib cage seems to be different allowing deeper breathing.    When last seen we had discussed acetazolamide, she meant to review that, did not wanted to wait until she had done  more frequency specific microcurrent.    Did have some insurance concerns, such that we changed to morphine 15 mg immediate release 4 times a day.  Notes most days that seems to give adequate control.   reviewed.  Urine drug test last October as expected.    Has been using coenzyme every 10 since last fall.    She did speak to her psychiatrist discontinuing the Lexapro which may have been causing some insomnia.  She is changed to Shawn E which may have been helping.  Has been using a probiotic.  I discussed adding in some zinc.    She has been using medical cannabis which seems to be helpful, has been able to stop nonsteroidals.      Current Outpatient Medications:      ACIDOPHILUS OR, 1 tablet daily, Disp: , Rfl:      aspirin 81 MG EC tablet, Take 1 tablet by mouth daily, Disp: , Rfl:      atorvastatin (LIPITOR) 20 MG tablet, Take 20 mg by mouth, Disp: , Rfl:      B Complex Vitamins (B-COMPLEX/B-12) LIQD, , Disp: , Rfl:      co-enzyme Q-10 100 MG CAPS capsule, Take 2 capsules by mouth 3 times daily, Disp: , Rfl:      famotidine (PEPCID) 40 MG tablet, Take 40 mg by mouth 2 times daily, Disp: , Rfl:      fish oil-omega-3 fatty acids 1000 MG capsule, Take 2 g by mouth daily., Disp: , Rfl:      glutamine 500 MG capsule, take 2 tabs in AM, Disp: , Rfl:      GLUTATHIONE PO, Take 500 mg by mouth, Disp: , Rfl:      Levocarnitine 500 MG CAPS, Take 1,000 mg by mouth, Disp: , Rfl:      LISINOPRIL PO, Take 5 mg by mouth daily, Disp: , Rfl:      Magnesium Bisglycinate (MAG GLYCINATE) 100 MG TABS, 200mg by oral route, taken 2-4x a day, Disp: , Rfl:      medical cannabis (Patient's own supply.  Not a prescription), 1 capsule (This is NOT a prescription, and does not certify that the patient has a qualifying medical condition for medical cannabis.  The purpose of this order is  to document that the patient reports taking medical cannabis.)  Vaping; 1-4 puffs inhaled up to 3 times a day as needed Lotion; 1 application, topical as  needed, Disp: , Rfl:      melatonin 5 MG sublingual tablet, Place 10 mg under the tongue, Disp: , Rfl:      metoprolol succinate ER (TOPROL-XL) 25 MG 24 hr tablet, Take 25 mg by mouth, Disp: , Rfl:      morphine (MSIR) 15 MG IR tablet, Take 1 tablet (15 mg) by mouth 4 times daily May fill 7/18 for start 7/20, Disp: 112 tablet, Rfl: 0     ondansetron (ZOFRAN-ODT) 8 MG ODT tab, Place 8 mg under the tongue every 8 hours as needed , Disp: , Rfl:      polyethylene glycol (MIRALAX) 17 GM/Dose powder, take (17G)  by oral route  as needed mixed with 8 oz. water, juice, soda, coffee or tea, Disp: , Rfl:      prochlorperazine (COMPAZINE) 5 MG tablet, TAKE 1 TABLET BY MOUTH UP TO EVERY 4 HOURS AS NEEDED FOR NAUSEA OR VOMITING, Disp: , Rfl:      S-Adenosylmethionine (SAME) 400 MG TABS, Take 400 mg by mouth once, Disp: , Rfl:      SUMAtriptan (IMITREX) 25 MG tablet, Take 1 tablet (25 mg) by mouth at onset of headache for migraine May repeat in 2 hours. Max 8 tablets/24 hours., Disp: 10 tablet, Rfl: 0     UNABLE TO FIND, Glycine 500 mg, up to 4731-9739 mg orally at HS, Disp: , Rfl:      vitamin C (ASCORBIC ACID) 1000 MG TABS, Take 1,000 mg by mouth daily, Disp: , Rfl:      VITAMIN D, CHOLECALCIFEROL, PO, Take 10,000 Units by mouth daily, Disp: , Rfl:      zolpidem (AMBIEN) 10 MG tablet, , Disp: , Rfl:      metoprolol tartrate (LOPRESSOR) 25 MG tablet, Take 1 tablet (25 mg) by mouth 2 times daily, Disp: 60 tablet, Rfl: 0     UNABLE TO FIND, Take 1,000 mg by mouth 2 times daily, Disp: , Rfl:       Review of Systems   General, psych, musculoskeletal, bowels and bladder otherwise normal other than above.          OBJECTIVE   /65   Pulse 81   Wt 91.2 kg (201 lb)   SpO2 96%   BMI 32.44 kg/m        Physical Exam  General: Appropriately groomed, ambulates with cane  Cardiovascular: Normal rate  Lungs: Pulmonary effort is normal, speaking in full sentences  MSK:   Skin: Warm and dry. No concerning rashes or lesions.  Neurologic:  No focal deficit, alert and oriented x3  Psychiatric: normal mood and affect, cooperative      Assessment: History of arachnoiditis, left hip replacement.  Has started the modality of frequency specific microcurrent may be noting some progress.    We have changed opioids due to restrictions within her insurance, changing the morphine immediate release 15 mg 4 times a day with fair benefit.    Reviewed with changes in staffing at the [facility], administrative responses to have patients with primary care providers outside the [facility] system to return to that system for management.  [facility].  Reviewed one of the resources I would recommend is [facility], which has providers that are also familiar with functional medicine and more alternative approaches.    Total time more than 30 minutes.

## 2022-07-06 NOTE — TELEPHONE ENCOUNTER
Completed the online enrollment for the Medical Cannabis program. Patient will receive either via mail or e-mail a packet of information to fill out.  Sue Moreno LPN  [facility]

## 2022-08-09 DIAGNOSIS — G89.4 CHRONIC PAIN SYNDROME: ICD-10-CM

## 2022-08-09 RX ORDER — MORPHINE SULFATE 15 MG/1
15 TABLET ORAL 4 TIMES DAILY
Qty: 112 TABLET | Refills: 0 | Status: SHIPPED | OUTPATIENT
Start: 2022-08-09

## 2022-10-22 ENCOUNTER — HEALTH MAINTENANCE LETTER (OUTPATIENT)
Age: 62
End: 2022-10-22

## 2022-11-11 ENCOUNTER — TELEPHONE (OUTPATIENT)
Dept: NEUROSURGERY | Facility: CLINIC | Age: 62
End: 2022-11-11

## 2022-11-11 NOTE — TELEPHONE ENCOUNTER
Unable to leave . Received referral from [facility] referral scanned into chart.     Please assist with scheduling.     Appt note:    Referred by [facility] to see Dr. Brooks. Evaluate for LBP, **verify if patient has been seen anywhere, MRI,PT or prev surg. If no schedule with an MARIOLA.

## 2022-11-14 ENCOUNTER — TELEPHONE (OUTPATIENT)
Dept: NEUROSURGERY | Facility: OTHER | Age: 62
End: 2022-11-14

## 2022-11-14 NOTE — TELEPHONE ENCOUNTER
Received outside referral from [facility]. Referral has been scanned into chart by RL.  Will need to confirm for imaging/ pt, injections, or prior surgeries before directly scheduling with dr. Brooks.

## 2022-12-15 ENCOUNTER — OFFICE VISIT (OUTPATIENT)
Dept: NEUROSURGERY | Facility: CLINIC | Age: 62
End: 2022-12-15
Payer: COMMERCIAL

## 2022-12-15 VITALS — DIASTOLIC BLOOD PRESSURE: 81 MMHG | SYSTOLIC BLOOD PRESSURE: 129 MMHG | OXYGEN SATURATION: 91 % | HEART RATE: 75 BPM

## 2022-12-15 DIAGNOSIS — M54.16 LUMBAR RADICULOPATHY: Primary | ICD-10-CM

## 2022-12-15 PROCEDURE — 99204 OFFICE O/P NEW MOD 45 MIN: CPT | Performed by: NEUROLOGICAL SURGERY

## 2022-12-15 ASSESSMENT — PAIN SCALES - GENERAL: PAINLEVEL: SEVERE PAIN (7)

## 2022-12-15 NOTE — NURSING NOTE
"Keshia Arellano is a 62 year old female who presents for:  Chief Complaint   Patient presents with     Consult     Si Joint dysfunction          Initial Vitals:  /81   Pulse 75   SpO2 91%  Estimated body mass index is 32.44 kg/m  as calculated from the following:    Height as of 4/1/22: 5' 6\" (1.676 m).    Weight as of 7/1/22: 201 lb (91.2 kg).. There is no height or weight on file to calculate BSA. BP completed using cuff size: large  Severe Pain (7)    Nursing Comments:     Oliverio Hawthorne MA    "
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
## 2023-04-01 ENCOUNTER — HEALTH MAINTENANCE LETTER (OUTPATIENT)
Age: 63
End: 2023-04-01

## 2024-06-01 ENCOUNTER — HEALTH MAINTENANCE LETTER (OUTPATIENT)
Age: 64
End: 2024-06-01

## 2024-06-03 ENCOUNTER — TRANSFERRED RECORDS (OUTPATIENT)
Dept: HEALTH INFORMATION MANAGEMENT | Facility: CLINIC | Age: 64
End: 2024-06-03

## 2024-06-24 ENCOUNTER — TELEPHONE (OUTPATIENT)
Dept: NEUROSURGERY | Facility: CLINIC | Age: 64
End: 2024-06-24
Payer: COMMERCIAL

## 2024-06-24 DIAGNOSIS — M54.16 LUMBAR RADICULOPATHY: Primary | ICD-10-CM

## 2024-06-24 NOTE — TELEPHONE ENCOUNTER
Faxed MRI orders to [facility] per patient request.  Faxed Form June 24, 2024 to fax number [phone]    Right Fax confirmed at 2:40 PM    [facility]

## 2024-06-24 NOTE — PROGRESS NOTES
Dr. Brooks [facility] updated MRI prior to appt. Order placed and sent to scheduling and care coordinator.

## 2024-06-24 NOTE — TELEPHONE ENCOUNTER
Faxed Form June 24, 2024 to fax number [phone]    Right Fax confirmed at 12:00 PM    Sent Orders to [facility]. Called patient to let her know the orders have been sent to [facility].  [facility]

## 2024-06-24 NOTE — TELEPHONE ENCOUNTER
Faxed Form June 24, 2024 to fax number [phone]    Right Fax confirmed at 2:40 PM  Faxed MRI orders to [facility]

## 2024-06-24 NOTE — TELEPHONE ENCOUNTER
Spoke with patient to schedule MRI prior to clinic visit with Dr. Brooks. Patient would like us to send a referral to [facility]. Thank you

## 2024-06-24 NOTE — TELEPHONE ENCOUNTER
[facility] Call Center    Phone Message    May a detailed message be left on voicemail: yes     Reason for Call: Other: Pt is calling and stating that she needs her MRI orders sent to [facility] in [place]     Action Taken: Message routed to:  Other: [facility]    Travel Screening: Not Applicable     Date of Service:

## 2024-06-27 ENCOUNTER — TRANSFERRED RECORDS (OUTPATIENT)
Dept: HEALTH INFORMATION MANAGEMENT | Facility: CLINIC | Age: 64
End: 2024-06-27
Payer: COMMERCIAL

## 2024-07-01 ENCOUNTER — OFFICE VISIT (OUTPATIENT)
Dept: NEUROSURGERY | Facility: CLINIC | Age: 64
End: 2024-07-01
Payer: COMMERCIAL

## 2024-07-01 VITALS — DIASTOLIC BLOOD PRESSURE: 78 MMHG | HEART RATE: 89 BPM | SYSTOLIC BLOOD PRESSURE: 121 MMHG | OXYGEN SATURATION: 94 %

## 2024-07-01 DIAGNOSIS — M54.16 LUMBAR RADICULOPATHY: Primary | ICD-10-CM

## 2024-07-01 PROCEDURE — 99214 OFFICE O/P EST MOD 30 MIN: CPT | Performed by: NEUROLOGICAL SURGERY

## 2024-07-01 ASSESSMENT — PAIN SCALES - GENERAL: PAINLEVEL: SEVERE PAIN (7)

## 2024-07-01 NOTE — PATIENT INSTRUCTIONS
Patient Next Steps:      You saw Dr Brooks today who has offered a Lumbar 4-5 laminectomy to treat your symptoms. Please call the clinic if you'd like to start planning for this surgery.    Please call us if you have any further questions or concerns.    [facility]   Phone: [phone]  Fax: [phone]
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
## 2024-07-01 NOTE — NURSING NOTE
"Keshia Arellano is a 63 year old female who presents for:  Chief Complaint   Patient presents with    RECHECK     Numbness in feet beginning in June, Pain in right leg        Initial Vitals:  /78   Pulse 89   SpO2 94%  Estimated body mass index is 32.44 kg/m  as calculated from the following:    Height as of 4/1/22: 5' 6\" (1.676 m).    Weight as of 7/1/22: 201 lb (91.2 kg).. There is no height or weight on file to calculate BSA. BP completed using cuff size: large  Severe Pain (7)    Nursing Comments:       Kati Fields    "
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
## 2024-07-15 ENCOUNTER — DOCUMENTATION ONLY (OUTPATIENT)
Dept: NEUROSURGERY | Facility: CLINIC | Age: 64
End: 2024-07-15
Payer: COMMERCIAL

## 2024-07-15 DIAGNOSIS — M54.16 LUMBAR RADICULOPATHY: Primary | ICD-10-CM

## 2024-07-15 NOTE — CONFIDENTIAL NOTE
Calling [phone] [facility] for PT notes. Will update and send in for prior authorization when received. Should be in by tomorrow per clinic rep.      Signed by Michelle Kaplan on July 15, 2024 3:38 PM

## 2024-07-16 ENCOUNTER — MYC MEDICAL ADVICE (OUTPATIENT)
Dept: NEUROSURGERY | Facility: CLINIC | Age: 64
End: 2024-07-16
Payer: COMMERCIAL

## 2024-07-16 ENCOUNTER — TELEPHONE (OUTPATIENT)
Dept: NEUROSURGERY | Facility: CLINIC | Age: 64
End: 2024-07-16
Payer: COMMERCIAL

## 2024-07-16 NOTE — TELEPHONE ENCOUNTER
[facility] Call Center    Phone Message    May a detailed message be left on voicemail: yes     Reason for Call: Other: Pt called back for Yadira. Pt stated she will be available the rest of the afternoon. Call back to advise      Action Taken: Message routed to:  Other: [facility]    Travel Screening: Not Applicable     Date of Service:

## 2024-07-16 NOTE — TELEPHONE ENCOUNTER
Called [facility] to inquire on a pain plan.     Left a message with [facility] , they report a MA will be calling us back to discuss.

## 2024-07-16 NOTE — TELEPHONE ENCOUNTER
Attempted to contact patient to review surgery education. Left VM requesting call back.    Information sent via [employer], message sent to [facility] to mail surgery folder.

## 2024-07-16 NOTE — TELEPHONE ENCOUNTER
Dr. Brooks has approved changing surgery to overnight observation, updated patient via [employer].     Staff message sent to MARIOLA team to update case request and FYI sent to surgery scheduler.

## 2024-07-16 NOTE — PATIENT INSTRUCTIONS
Patient Instructions    Surgery scheduled at [facility] for Lumbar 4 to lumbar 5 laminectomy  with Dr. Brooks    Pre-Operative    Surgical risks: blood clots in the leg or lung, problems urinating, nerve damage, drainage from the incision, infection, stiffness    You will need a Pre-operative physical with primary care physician within 30 days prior to your surgical date. Please schedule this more than 7 days prior to your surgical date to allow time for follow up relating to surgical clearance.    This is a same day procedure with discharge home day of surgery.      Shower procedure  You will need to shower the night before and morning of surgery using the antimicrobial soap (chlorhexidine) given to you. Please refer to showering instruction sheet in surgery education folder.    Eating/Drinking  Stop all solid foods 8 hours before surgery.  Keep drinking clear liquids until 4 hours before surgery  Clear liquids include water, clear juice, black coffee, or clear tea without milk, Gatorade, clear soda.     Medications  Discontinue Aspirin & NSAIDs (Advil/Ibuprofen, Indocin, Naproxen,Nuprin,Relafen/Nabumetone, Diclofenac,Meloxicam, Aleve, Celebrex) 7 days prior to surgical date. After surgery, do not begin taking these medications until given clearance as it may cause bleeding and interfere with healing.  It is ok to take Tylenol (Acetaminophen) for pain within the 7 days prior to surgery. Example: you could take 1000 mg 3 times per day. Do not exceed 3,000 mg per day.   If you are on chronic pain medication (oxycodone, Percocet, hydrocodone, Vicodin, Norco, Dilaudid, morphine, MS Contin, naltrexone, Suboxone, etc) or have a pain contract we will reach out to your pain clinic to gather your most recent records and recommendations for pain management post-op.  Please ask your provider who manages your chronic pain if they require you to schedule an office visit prior to surgery. Continue  obtaining your pain medications from your current provider until surgery. Our team will manage your acute post-op pain in the hospital and during the recovery period. Your pain team will continue to manage your chronic pain.   Any other medications prescribed, please discuss with your primary care provider at your pre-operative physical       Post-Operative    Incision Care  Look at your incision site every day. You  may need a mirror or family member to help you.   Watch for signs of infection  Redness, swelling, warmth, drainage (Green or yellow drainage (pus) from your incision or increased bloody drainage), and fever of 101 degrees or higher  [facility] [phone]  Remove dressing as instructed upon discharge  Do not apply lotions or ointments to incision  It is okay to shower, just pat the incision dry   No submerging incision in water such as pools, hot tubs, or baths for at least 8 weeks and until the incision is healed    Pain Management  Dealing with pain  As your body heals, you might feel a stabbing, burning, or aching pain. You may also have some numbness.  Everyone feels pain differently, we may ask you to rate your pain using a pain scale. This will let us know how much pain you feel.   Keep in mind that medicine won't take away all of your pain. It helps to try other ways to relax and ease pain.   Things to help with pain  After surgery, we will give you medicine for your pain. These medications work well, but they can make you drowsy, itchy, or sick to your stomach. If we give you narcotics for pain, try to take the pills with food.   For mild to moderate pain, you can take medication such as Tylenol. These can be used with narcotics, but make sure that your narcotic does not contain Tylenol.   Do NOT drive while taking narcotic pain medication  Do NOT drink alcohol while using any pain medication  You can utilize ice as needed (no longer than 20 minutes at one time)  Refills of pain medication:  please call the neurosurgery clinic to request 2-3 days before you run out  We will continue to refill your pain medications for up to 12 weeks after surgery. If you are still needing refills around the 8 week karthikeyan, please schedule your Primary Care or Pain Provider before the 12 week post-op karthikeyan.  Aspirin & NSAIDS (ex. Ibuprofen, aleve, naproxen): Don't take NSAIDs until 2 weeks after surgery to reduce risk of bleeding and interference with bone healing     Bowel Care  Many people have constipation (hard stools) after surgery. The narcotic pain medication we often prescribed can contribute to constipation. To help prevent constipation: Drink plenty of fluid (8-10 glasses/day); Eat more fiber, such as whole grain bread, bran cereal, and fruits and vegetables; Stay active by walking; Over the counter stool softener may also help.      Activity Restrictions  For the first 2 weeks, no lifting > 10 pounds, limited bending, twisting, or overhead reaching.  For weeks 2 through your 6 week post-op appointment, no lifting > 25 pounds, limited bending, twisting, or overhead reaching.  You will be re-evaluated at your 6 week post-op visit. You will have some level of restrictions through 3 months post-op.  Take stairs in moderation   Walking is the best way to start exercise after surgery. Take short frequent walks. You may gradually increase the distance as tolerated. If you feel pain, decrease your activity, but DO NOT stop walking. Walking will help you gain strength, prevent muscle soreness and spasms, and prevent blood clots.  Avoid bed rest and prolonged sitting for longer than 30 minutes (change positions frequently while awake)  No contact sports or high impact activities such as; running/jogging, snowmobile or 4 ayala riding or any other recreational vehicles until after given clearance at one of your follow up visits    Contact clinic right away or go to the Emergency Department if you develop:   Infection  (incisional redness, swelling, warmth, drainage, or fever (temp > 101 F))  New injury  Bladder or bowel changes or loss of control    Signs of blood clot:  Swelling and/or warmth in one or both legs  Pain or tenderness in your leg, ankle, foot, or arm   Red or discolored skin     Go to the Emergency Department   If sudden onset of severe headache, weakness, confusion, change in level of consciousness, pain, or loss of movement.  Chest pain  Trouble breathing     Post-Op Follow Up Appointments  2 week incision check & staple/suture removal with a Neurosurgery Nurse  6 week and 3 month post-op visit with Physican Assistant or Nurse Practitioner     Resources  If you are currently employed, you will need to be off work for 2-4 weeks for recovery and healing.  Please fax any FMLA/short term disability paperwork to [phone] prior to surgery  You may call our clinic when you'd like to return to work and we can provide a work letter  To allow staff adequate time to complete paperwork, please send as soon as possible     [facility]  [facility] - [address] [address]  Telephone:  [phone]       Fax:  [phone]

## 2024-07-16 NOTE — TELEPHONE ENCOUNTER
Called patient to review surgery education.     Patient reports Hibiclens has a fragrance in it and she is allergic to fragrance. Recommended she discuss options with her pharmacist, she verbalized understanding.     Surgery currently listed as SDS patient reports she will be taking a medical ride to and from the hospital and does not have anyone to stay with her once she is home or to check in on her. She is inquiring on an overnight procedure, will review with Dr. Brooks.     Patient currently prescribed morphine 15 mg IR QID by [facility], will contact their team for post-op pain recommendations. 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 She states dilaudid was helpful in the past.         Reviewed pre- and post-operative instructions as outlined in the After Visit Summary/Patient Instructions with patient.   Surgery folder was mailed to patient     Patient Education Topic: Procedure with Dr. Brooks     Learner(s): Patient    Knowledge Level: Basic    Readiness to Learn: Ready    Method:  Verbal explanation and Written material     Outcome: Able to verbalize instructions    Barriers to Learning: No barrier      NDI/KAILASH: Confirmation of completion within last 6 months    Smoking Status: Former smoker      Pain Clinic: Patient's chronic pain is being managed by Cody sharpe, RN to contact pain clinic to obtain records. RN to update [facility] team when received.     STD/FMLA: No  Job Description: Not applicable   Time Off: Not applicable      Patient had the opportunity for questions to be answered. Advised Patient to call clinic with any questions/concerns.

## 2024-07-18 NOTE — TELEPHONE ENCOUNTER
Called [facility] again 7/18 as we have not heard back from 7/16 call.     On hold for 20 minutes, told no MA is currently available to speak with us. Left message with  staff again requesting call back, they report it will be routed high priority.    Will await call back.

## 2024-07-19 NOTE — TELEPHONE ENCOUNTER
Per secure chat from central scheduling yesterday [facility] returned call, no encounter was routed to nursing for review.     Call [facility] back for pain plan.

## 2024-07-19 NOTE — TELEPHONE ENCOUNTER
Returned call to [facility] to discuss. They report patient is scheduled for an appointment with their team on 7/24/24 to discuss upcoming surgery and recommendations. They will review at that time. Requested they call our team after this appointment to provide the update, they agreed.     Updated patient via [employer].

## 2024-07-22 NOTE — TELEPHONE ENCOUNTER
Called pre-op as requested by Jacy Osborne CNP to determine if they have recommendations on patient not being able to use Hibiclens due to fragrance.     Pre-op department reports patient can use unscented dial soap for pre-op showering. Updated patient via [employer].

## 2024-07-25 NOTE — TELEPHONE ENCOUNTER
Patient should have had appointment with [facility] yesterday.     Called [facility] to discuss, spoke with medical assistant. Inquired on below:    -will our teams be prescribing at the same time? Will she take her chronic meds on top of acute meds?    -what do they recommend we prescribe for post-op pain control since she is unable to use oxycodone?    Medical assistant will be reviewing with provider and calling our team back with an update. Will await call back.         Per prior notes:  Patient currently prescribed morphine 15 mg IR QID by [facility], will contact their team for post-op pain recommendations. 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 She states dilaudid was helpful in the past.

## 2024-07-29 NOTE — TELEPHONE ENCOUNTER
Have not received a call back from [facility] with plan. Called again to review.     Was on hold for over 10 minutes when told no one is available to take the call. They report someone will call our team back today to discuss.

## 2024-07-31 NOTE — TELEPHONE ENCOUNTER
Still no call back from [place], called again. Spoke with an MA who reports prior MA did not document that there were any questions needing to be asked.     Reviewed questions again and surgery date/procedure.     MA reports he will call our team back with an update today. Recommended he ask to speak with an RN while calling so we can try to take the live call.

## 2024-07-31 NOTE — TELEPHONE ENCOUNTER
"[facility]    [facility] reports they will continue prescribing patients chronic regimen of morphine 15 mg IR QID pre-op and post-op.     Our team can prescribe acute pain medications on top of chronic routine. Patient has an enzyme condition and cannot use oxycodone. [facility] reports we could prescribe morphine or dilaudid for acute pain control. They do not have recommendations on dosing, they stated \"however her pain is\".     They would also recommend consulting IP pain team for assistance.     Sticky note updated. Routed to [facility] team.  "

## 2024-08-13 RX ORDER — HYDROXYZINE PAMOATE 50 MG/1
100 CAPSULE ORAL AT BEDTIME
COMMUNITY

## 2024-08-13 RX ORDER — TRIAMCINOLONE ACETONIDE 1 MG/G
OINTMENT TOPICAL PRN
COMMUNITY

## 2024-08-13 RX ORDER — DESVENLAFAXINE 50 MG/1
50 TABLET, FILM COATED, EXTENDED RELEASE ORAL DAILY
COMMUNITY

## 2024-08-13 RX ORDER — FLUTICASONE PROPIONATE 50 MCG
1 SPRAY, SUSPENSION (ML) NASAL DAILY
COMMUNITY

## 2024-08-13 RX ORDER — CELECOXIB 200 MG/1
200 CAPSULE ORAL DAILY
Status: ON HOLD | COMMUNITY
End: 2024-08-16

## 2024-08-13 RX ORDER — BACLOFEN 10 MG/1
10 TABLET ORAL PRN
COMMUNITY

## 2024-08-13 NOTE — PROGRESS NOTES
PTA medications updated by Medication Scribe prior to surgery via phone call with patient (last doses completed by Nurse)     Medication history sources: Patient, Surescripts, and H&P  In the past week, patient estimated taking medication this percent of the time: Greater than 90%      Significant changes made to the medication list:  Patient reports no longer taking the following meds (med scribe removed from PTA med list): L-Carnitine, Glutathione      Additional medication history information:   Patient was advised to bring: Flonase, Triancinolone    Medication reconciliation completed by provider prior to medication history? No    Time spent in this activity: 45 minutes    The information provided in this note is only as accurate as the sources available at the time of update(s)      Prior to Admission medications    Medication Sig Last Dose Taking? Auth Provider Long Term End Date   ACIDOPHILUS OR 1 tablet daily 8/8/2024 at pm Yes Reported, Patient     aspirin 81 MG EC tablet Take 1 tablet by mouth daily 8/8/2024 at am Yes Reported, Patient     atorvastatin (LIPITOR) 20 MG tablet Take 20 mg by mouth at bedtime  at pm Yes Reported, Patient Yes    baclofen (LIORESAL) 10 MG tablet Take 10 mg by mouth as needed for muscle spasms More than a month at PRN Yes Reported, Patient     celecoxib (CELEBREX) 200 MG capsule Take 200 mg by mouth daily 8/8/2024 at pm Yes Reported, Patient Yes    Coenzyme Q10 300 MG CAPS Take 300 mg by mouth 2 times daily ( 8/8/2024 at pm Yes Reported, Patient     desvenlafaxine (PRISTIQ) 50 MG 24 hr tablet Take 50 mg by mouth daily  at am Yes Reported, Patient Yes    famotidine (PEPCID) 40 MG tablet Take 40 mg by mouth 2 times daily  at pm Yes Reported, Patient     fish oil-omega-3 fatty acids 1000 MG capsule Take 2 g by mouth daily. 8/1/2024 at pm Yes Reported, Patient     fluticasone (FLONASE) 50 MCG/ACT nasal spray Spray 1 spray into both nostrils daily  at PRN Yes Reported, Patient      glutamine 500 MG capsule 2,000-3,000 mg daily 8/8/2024 at am Yes Reported, Patient     hydrOXYzine (VISTARIL) 50 MG capsule Take 100 mg by mouth at bedtime ([id number])  at pm Yes Reported, Patient     lisinopril (ZESTRIL) 5 MG tablet Take 5 mg by mouth daily  at am Yes Reported, Patient Yes    MAGNESIUM CITRATE PO Take 400 mg by mouth daily 8/8/2024 at pm Yes Reported, Patient     medical cannabis (Patient's own supply.  Not a prescription) 1 capsule (This is NOT a prescription, and does not certify that the patient has a qualifying medical condition for medical cannabis.  The purpose of this order is  to document that the patient reports taking medical cannabis.)   Vaping; 1-4 puffs inhaled up to 3 times a day as needed  Lotion; 1 application, topical as needed  at pm Yes Reported, Patient     metoprolol succinate ER (TOPROL-XL) 25 MG 24 hr tablet Take 25 mg by mouth  at am Yes Reported, Patient Yes    morphine (MSIR) 15 MG IR tablet Take 1 tablet (15 mg) by mouth 4 times daily May fill 8/15 for 8/17  at midnight Yes Fernando Pearson MD Yes    naloxone (NARCAN) 4 MG/0.1ML nasal spray Spray 4 mg into one nostril alternating nostrils as needed for opioid reversal every 2-3 minutes until assistance arrives  at PRN Yes Reported, Patient     ondansetron (ZOFRAN-ODT) 8 MG ODT tab Place 8 mg under the tongue every 8 hours as needed  More than a month at PRN Yes Reported, Patient     prochlorperazine (COMPAZINE) 5 MG tablet TAKE 1 TABLET BY MOUTH UP TO EVERY 4 HOURS AS NEEDED FOR NAUSEA OR VOMITING More than a month at PRN Yes Reported, Patient     S-Adenosylmethionine (SAME) 400 MG TABS Take 400 mg by mouth daily 8/8/2024 at am Yes Reported, Patient     SUMAtriptan (IMITREX) 25 MG tablet Take 1 tablet (25 mg) by mouth at onset of headache for migraine May repeat in 2 hours. Max 8 tablets/24 hours.  at PRN Yes Dolores Bobby PA-C     triamcinolone (KENALOG) 0.1 % external ointment Apply topically as needed for  irritation  at PRN Yes Reported, Patient     UNABLE TO FIND Glycine 500 mg, up to 5942-2820 mg orally at HS 8/8/2024 at PRN Yes Reported, Patient     vitamin B-Complex Take 1 tablet by mouth daily 8/8/2024 at pm Yes Reported, Patient     vitamin C (ASCORBIC ACID) 1000 MG TABS Take 1,000 mg by mouth 2 times daily 8/8/2024 at pm Yes Reported, Patient     zolpidem (AMBIEN) 10 MG tablet   at pm Yes Reported, Patient No    polyethylene glycol (MIRALAX) 17 GM/Dose powder take (17G)  by oral route  as needed mixed with 8 oz. water, juice, soda, coffee or tea  at pm  Reported, Patient         Medication history completed by: Raheel Brady

## 2024-08-15 ENCOUNTER — ANESTHESIA EVENT (OUTPATIENT)
Dept: SURGERY | Facility: CLINIC | Age: 64
End: 2024-08-15
Payer: COMMERCIAL

## 2024-08-16 ENCOUNTER — ANESTHESIA (OUTPATIENT)
Dept: SURGERY | Facility: CLINIC | Age: 64
End: 2024-08-16
Payer: COMMERCIAL

## 2024-08-16 ENCOUNTER — HOSPITAL ENCOUNTER (OUTPATIENT)
Facility: CLINIC | Age: 64
Setting detail: OBSERVATION
Discharge: HOME | End: 2024-08-17
Attending: NEUROLOGICAL SURGERY | Admitting: NEUROLOGICAL SURGERY
Payer: COMMERCIAL

## 2024-08-16 ENCOUNTER — APPOINTMENT (OUTPATIENT)
Dept: GENERAL RADIOLOGY | Facility: CLINIC | Age: 64
End: 2024-08-16
Attending: NEUROLOGICAL SURGERY
Payer: COMMERCIAL

## 2024-08-16 DIAGNOSIS — Z98.890 S/P LAMINECTOMY: Primary | ICD-10-CM

## 2024-08-16 LAB
ALBUMIN SERPL BCG-MCNC: 4.2 G/DL (ref 3.5–5.2)
ALP SERPL-CCNC: 108 U/L (ref 40–150)
ALT SERPL W P-5'-P-CCNC: 23 U/L (ref 0–50)
AST SERPL W P-5'-P-CCNC: 23 U/L (ref 0–45)
BILIRUB DIRECT SERPL-MCNC: <0.2 MG/DL (ref 0–0.3)
BILIRUB SERPL-MCNC: 0.4 MG/DL
CREAT SERPL-MCNC: 0.73 MG/DL (ref 0.51–0.95)
EGFRCR SERPLBLD CKD-EPI 2021: >90 ML/MIN/1.73M2
FASTING STATUS PATIENT QL REPORTED: YES
GLUCOSE SERPL-MCNC: 93 MG/DL (ref 70–99)
POTASSIUM SERPL-SCNC: 3.8 MMOL/L (ref 3.4–5.3)
PROT SERPL-MCNC: 6.8 G/DL (ref 6.4–8.3)

## 2024-08-16 PROCEDURE — 82565 ASSAY OF CREATININE: CPT | Performed by: ANESTHESIOLOGY

## 2024-08-16 PROCEDURE — 250N000012 HC RX MED GY IP 250 OP 636 PS 637: Performed by: ANESTHESIOLOGY

## 2024-08-16 PROCEDURE — 99204 OFFICE O/P NEW MOD 45 MIN: CPT | Performed by: HOSPITALIST

## 2024-08-16 PROCEDURE — 250N000013 HC RX MED GY IP 250 OP 250 PS 637

## 2024-08-16 PROCEDURE — 96374 THER/PROPH/DIAG INJ IV PUSH: CPT | Mod: 59

## 2024-08-16 PROCEDURE — 84132 ASSAY OF SERUM POTASSIUM: CPT | Performed by: ANESTHESIOLOGY

## 2024-08-16 PROCEDURE — 63047 LAM FACETEC & FORAMOT LUMBAR: CPT | Performed by: NURSE ANESTHETIST, CERTIFIED REGISTERED

## 2024-08-16 PROCEDURE — 63047 LAM FACETEC & FORAMOT LUMBAR: CPT | Performed by: NEUROLOGICAL SURGERY

## 2024-08-16 PROCEDURE — 250N000013 HC RX MED GY IP 250 OP 250 PS 637: Performed by: HOSPITALIST

## 2024-08-16 PROCEDURE — 250N000009 HC RX 250: Performed by: NURSE ANESTHETIST, CERTIFIED REGISTERED

## 2024-08-16 PROCEDURE — 82947 ASSAY GLUCOSE BLOOD QUANT: CPT | Performed by: ANESTHESIOLOGY

## 2024-08-16 PROCEDURE — 63047 LAM FACETEC & FORAMOT LUMBAR: CPT | Mod: AS | Performed by: NURSE PRACTITIONER

## 2024-08-16 PROCEDURE — 370N000017 HC ANESTHESIA TECHNICAL FEE, PER MIN: Performed by: NEUROLOGICAL SURGERY

## 2024-08-16 PROCEDURE — 96376 TX/PRO/DX INJ SAME DRUG ADON: CPT | Mod: 59

## 2024-08-16 PROCEDURE — 250N000011 HC RX IP 250 OP 636: Performed by: ANESTHESIOLOGY

## 2024-08-16 PROCEDURE — 360N000084 HC SURGERY LEVEL 4 W/ FLUORO, PER MIN: Performed by: NEUROLOGICAL SURGERY

## 2024-08-16 PROCEDURE — 250N000011 HC RX IP 250 OP 636: Performed by: PHYSICIAN ASSISTANT

## 2024-08-16 PROCEDURE — 250N000011 HC RX IP 250 OP 636: Performed by: NURSE ANESTHETIST, CERTIFIED REGISTERED

## 2024-08-16 PROCEDURE — 99214 OFFICE O/P EST MOD 30 MIN: CPT

## 2024-08-16 PROCEDURE — 258N000003 HC RX IP 258 OP 636: Performed by: ANESTHESIOLOGY

## 2024-08-16 PROCEDURE — 999N000157 HC STATISTIC RCP TIME EA 10 MIN

## 2024-08-16 PROCEDURE — 272N000001 HC OR GENERAL SUPPLY STERILE: Performed by: NEUROLOGICAL SURGERY

## 2024-08-16 PROCEDURE — 250N000009 HC RX 250: Performed by: NEUROLOGICAL SURGERY

## 2024-08-16 PROCEDURE — 999N000179 XR SURGERY CARM FLUORO LESS THAN 5 MIN W STILLS

## 2024-08-16 PROCEDURE — 999N000141 HC STATISTIC PRE-PROCEDURE NURSING ASSESSMENT: Performed by: NEUROLOGICAL SURGERY

## 2024-08-16 PROCEDURE — 710N000009 HC RECOVERY PHASE 1, LEVEL 1, PER MIN: Performed by: NEUROLOGICAL SURGERY

## 2024-08-16 PROCEDURE — 36415 COLL VENOUS BLD VENIPUNCTURE: CPT | Performed by: ANESTHESIOLOGY

## 2024-08-16 PROCEDURE — 250N000011 HC RX IP 250 OP 636

## 2024-08-16 PROCEDURE — 250N000013 HC RX MED GY IP 250 OP 250 PS 637: Performed by: PHYSICIAN ASSISTANT

## 2024-08-16 PROCEDURE — 82040 ASSAY OF SERUM ALBUMIN: CPT

## 2024-08-16 PROCEDURE — 258N000003 HC RX IP 258 OP 636: Performed by: NURSE ANESTHETIST, CERTIFIED REGISTERED

## 2024-08-16 PROCEDURE — 63047 LAM FACETEC & FORAMOT LUMBAR: CPT | Performed by: ANESTHESIOLOGY

## 2024-08-16 PROCEDURE — 250N000013 HC RX MED GY IP 250 OP 250 PS 637: Performed by: ANESTHESIOLOGY

## 2024-08-16 PROCEDURE — 250N000011 HC RX IP 250 OP 636: Performed by: NURSE PRACTITIONER

## 2024-08-16 RX ORDER — HYDROMORPHONE HCL IN WATER/PF 6 MG/30 ML
0.2 PATIENT CONTROLLED ANALGESIA SYRINGE INTRAVENOUS EVERY 5 MIN PRN
Status: DISCONTINUED | OUTPATIENT
Start: 2024-08-16 | End: 2024-08-16 | Stop reason: HOSPADM

## 2024-08-16 RX ORDER — FENTANYL CITRATE 50 UG/ML
INJECTION, SOLUTION INTRAMUSCULAR; INTRAVENOUS PRN
Status: DISCONTINUED | OUTPATIENT
Start: 2024-08-16 | End: 2024-08-16

## 2024-08-16 RX ORDER — CALCIUM CARBONATE 500 MG/1
500 TABLET, CHEWABLE ORAL 4 TIMES DAILY PRN
Status: DISCONTINUED | OUTPATIENT
Start: 2024-08-16 | End: 2024-08-17 | Stop reason: HOSPADM

## 2024-08-16 RX ORDER — BACLOFEN 10 MG/1
10 TABLET ORAL DAILY PRN
Status: DISCONTINUED | OUTPATIENT
Start: 2024-08-16 | End: 2024-08-17 | Stop reason: HOSPADM

## 2024-08-16 RX ORDER — ACETAMINOPHEN 325 MG/1
975 TABLET ORAL EVERY 8 HOURS
Status: DISCONTINUED | OUTPATIENT
Start: 2024-08-16 | End: 2024-08-17 | Stop reason: HOSPADM

## 2024-08-16 RX ORDER — HYDROXYZINE HYDROCHLORIDE 25 MG/1
25 TABLET, FILM COATED ORAL EVERY 6 HOURS PRN
Qty: 30 TABLET | Refills: 0 | Status: SHIPPED | OUTPATIENT
Start: 2024-08-16

## 2024-08-16 RX ORDER — POLYETHYLENE GLYCOL 3350 17 G/17G
17 POWDER, FOR SOLUTION ORAL DAILY
Status: DISCONTINUED | OUTPATIENT
Start: 2024-08-16 | End: 2024-08-17 | Stop reason: HOSPADM

## 2024-08-16 RX ORDER — ONDANSETRON 4 MG/1
4 TABLET, ORALLY DISINTEGRATING ORAL EVERY 6 HOURS PRN
Status: DISCONTINUED | OUTPATIENT
Start: 2024-08-16 | End: 2024-08-17 | Stop reason: HOSPADM

## 2024-08-16 RX ORDER — MORPHINE SULFATE 15 MG/1
15 TABLET ORAL EVERY 4 HOURS PRN
Status: DISCONTINUED | OUTPATIENT
Start: 2024-08-16 | End: 2024-08-17 | Stop reason: HOSPADM

## 2024-08-16 RX ORDER — ACETAMINOPHEN 325 MG/1
650 TABLET ORAL EVERY 4 HOURS PRN
Qty: 100 TABLET | Refills: 0 | Status: SHIPPED | OUTPATIENT
Start: 2024-08-16

## 2024-08-16 RX ORDER — OXYCODONE HYDROCHLORIDE 5 MG/1
5-10 TABLET ORAL EVERY 6 HOURS PRN
Qty: 40 TABLET | Refills: 0 | Status: SHIPPED | OUTPATIENT
Start: 2024-08-16 | End: 2024-08-17

## 2024-08-16 RX ORDER — LIDOCAINE 4 G/G
2 PATCH TOPICAL
Status: DISCONTINUED | OUTPATIENT
Start: 2024-08-16 | End: 2024-08-17 | Stop reason: HOSPADM

## 2024-08-16 RX ORDER — DEXMEDETOMIDINE HYDROCHLORIDE 4 UG/ML
INJECTION, SOLUTION INTRAVENOUS CONTINUOUS PRN
Status: DISCONTINUED | OUTPATIENT
Start: 2024-08-16 | End: 2024-08-16

## 2024-08-16 RX ORDER — LIDOCAINE 40 MG/G
CREAM TOPICAL
Status: DISCONTINUED | OUTPATIENT
Start: 2024-08-16 | End: 2024-08-17 | Stop reason: HOSPADM

## 2024-08-16 RX ORDER — FAMOTIDINE 20 MG/1
40 TABLET, FILM COATED ORAL 2 TIMES DAILY
Status: DISCONTINUED | OUTPATIENT
Start: 2024-08-16 | End: 2024-08-17 | Stop reason: HOSPADM

## 2024-08-16 RX ORDER — METOPROLOL SUCCINATE 25 MG/1
25 TABLET, EXTENDED RELEASE ORAL DAILY
Status: DISCONTINUED | OUTPATIENT
Start: 2024-08-17 | End: 2024-08-17 | Stop reason: HOSPADM

## 2024-08-16 RX ORDER — APREPITANT 40 MG/1
40 CAPSULE ORAL ONCE
Status: COMPLETED | OUTPATIENT
Start: 2024-08-16 | End: 2024-08-16

## 2024-08-16 RX ORDER — AMOXICILLIN 250 MG
1 CAPSULE ORAL 2 TIMES DAILY PRN
Qty: 20 TABLET | Refills: 0 | Status: SHIPPED | OUTPATIENT
Start: 2024-08-16 | End: 2024-10-03

## 2024-08-16 RX ORDER — CLINDAMYCIN PHOSPHATE 900 MG/50ML
900 INJECTION, SOLUTION INTRAVENOUS SEE ADMIN INSTRUCTIONS
Status: DISCONTINUED | OUTPATIENT
Start: 2024-08-16 | End: 2024-08-16 | Stop reason: HOSPADM

## 2024-08-16 RX ORDER — FLUTICASONE PROPIONATE 50 MCG
1 SPRAY, SUSPENSION (ML) NASAL DAILY
Status: DISCONTINUED | OUTPATIENT
Start: 2024-08-16 | End: 2024-08-17 | Stop reason: HOSPADM

## 2024-08-16 RX ORDER — MAGNESIUM HYDROXIDE 1200 MG/15ML
LIQUID ORAL PRN
Status: DISCONTINUED | OUTPATIENT
Start: 2024-08-16 | End: 2024-08-16 | Stop reason: HOSPADM

## 2024-08-16 RX ORDER — FENTANYL CITRATE 0.05 MG/ML
25 INJECTION, SOLUTION INTRAMUSCULAR; INTRAVENOUS EVERY 5 MIN PRN
Status: DISCONTINUED | OUTPATIENT
Start: 2024-08-16 | End: 2024-08-16 | Stop reason: HOSPADM

## 2024-08-16 RX ORDER — HYDROMORPHONE HCL IN WATER/PF 6 MG/30 ML
0.2 PATIENT CONTROLLED ANALGESIA SYRINGE INTRAVENOUS
Status: DISCONTINUED | OUTPATIENT
Start: 2024-08-16 | End: 2024-08-16

## 2024-08-16 RX ORDER — ONDANSETRON 2 MG/ML
INJECTION INTRAMUSCULAR; INTRAVENOUS PRN
Status: DISCONTINUED | OUTPATIENT
Start: 2024-08-16 | End: 2024-08-16

## 2024-08-16 RX ORDER — ONDANSETRON 4 MG/1
8 TABLET, ORALLY DISINTEGRATING ORAL EVERY 8 HOURS PRN
Status: DISCONTINUED | OUTPATIENT
Start: 2024-08-16 | End: 2024-08-16

## 2024-08-16 RX ORDER — MORPHINE SULFATE 15 MG/1
15 TABLET ORAL 4 TIMES DAILY
Status: DISCONTINUED | OUTPATIENT
Start: 2024-08-16 | End: 2024-08-16

## 2024-08-16 RX ORDER — BUPIVACAINE HYDROCHLORIDE AND EPINEPHRINE 5; 5 MG/ML; UG/ML
INJECTION, SOLUTION PERINEURAL PRN
Status: DISCONTINUED | OUTPATIENT
Start: 2024-08-16 | End: 2024-08-16 | Stop reason: HOSPADM

## 2024-08-16 RX ORDER — PROCHLORPERAZINE MALEATE 5 MG
5 TABLET ORAL EVERY 4 HOURS PRN
Status: DISCONTINUED | OUTPATIENT
Start: 2024-08-16 | End: 2024-08-16

## 2024-08-16 RX ORDER — ACETAMINOPHEN 325 MG/1
975 TABLET ORAL ONCE
Status: COMPLETED | OUTPATIENT
Start: 2024-08-16 | End: 2024-08-16

## 2024-08-16 RX ORDER — ONDANSETRON 4 MG/1
4 TABLET, ORALLY DISINTEGRATING ORAL EVERY 30 MIN PRN
Status: DISCONTINUED | OUTPATIENT
Start: 2024-08-16 | End: 2024-08-16 | Stop reason: HOSPADM

## 2024-08-16 RX ORDER — HYDROXYZINE HYDROCHLORIDE 25 MG/1
25 TABLET, FILM COATED ORAL EVERY 6 HOURS PRN
Status: DISCONTINUED | OUTPATIENT
Start: 2024-08-16 | End: 2024-08-17 | Stop reason: HOSPADM

## 2024-08-16 RX ORDER — OXYCODONE HYDROCHLORIDE 5 MG/1
5 TABLET ORAL EVERY 4 HOURS PRN
Status: DISCONTINUED | OUTPATIENT
Start: 2024-08-16 | End: 2024-08-16

## 2024-08-16 RX ORDER — KETAMINE HYDROCHLORIDE 10 MG/ML
INJECTION INTRAMUSCULAR; INTRAVENOUS PRN
Status: DISCONTINUED | OUTPATIENT
Start: 2024-08-16 | End: 2024-08-16

## 2024-08-16 RX ORDER — LACTOBACILLUS RHAMNOSUS GG 10B CELL
1 CAPSULE ORAL 2 TIMES DAILY
Status: DISCONTINUED | OUTPATIENT
Start: 2024-08-16 | End: 2024-08-17 | Stop reason: HOSPADM

## 2024-08-16 RX ORDER — ONDANSETRON 2 MG/ML
4 INJECTION INTRAMUSCULAR; INTRAVENOUS EVERY 30 MIN PRN
Status: DISCONTINUED | OUTPATIENT
Start: 2024-08-16 | End: 2024-08-16 | Stop reason: HOSPADM

## 2024-08-16 RX ORDER — FENTANYL CITRATE 0.05 MG/ML
50 INJECTION, SOLUTION INTRAMUSCULAR; INTRAVENOUS EVERY 5 MIN PRN
Status: DISCONTINUED | OUTPATIENT
Start: 2024-08-16 | End: 2024-08-16 | Stop reason: HOSPADM

## 2024-08-16 RX ORDER — DESVENLAFAXINE 50 MG/1
50 TABLET, FILM COATED, EXTENDED RELEASE ORAL DAILY
Status: DISCONTINUED | OUTPATIENT
Start: 2024-08-17 | End: 2024-08-17 | Stop reason: HOSPADM

## 2024-08-16 RX ORDER — LISINOPRIL 5 MG/1
5 TABLET ORAL DAILY
Status: DISCONTINUED | OUTPATIENT
Start: 2024-08-16 | End: 2024-08-17 | Stop reason: HOSPADM

## 2024-08-16 RX ORDER — SODIUM CHLORIDE, SODIUM LACTATE, POTASSIUM CHLORIDE, CALCIUM CHLORIDE 600; 310; 30; 20 MG/100ML; MG/100ML; MG/100ML; MG/100ML
INJECTION, SOLUTION INTRAVENOUS CONTINUOUS
Status: DISCONTINUED | OUTPATIENT
Start: 2024-08-16 | End: 2024-08-16 | Stop reason: HOSPADM

## 2024-08-16 RX ORDER — DEXAMETHASONE SODIUM PHOSPHATE 4 MG/ML
4 INJECTION, SOLUTION INTRA-ARTICULAR; INTRALESIONAL; INTRAMUSCULAR; INTRAVENOUS; SOFT TISSUE
Status: DISCONTINUED | OUTPATIENT
Start: 2024-08-16 | End: 2024-08-16 | Stop reason: HOSPADM

## 2024-08-16 RX ORDER — OXYCODONE HYDROCHLORIDE 5 MG/1
10 TABLET ORAL EVERY 4 HOURS PRN
Status: DISCONTINUED | OUTPATIENT
Start: 2024-08-16 | End: 2024-08-16

## 2024-08-16 RX ORDER — NALOXONE HYDROCHLORIDE 0.4 MG/ML
0.1 INJECTION, SOLUTION INTRAMUSCULAR; INTRAVENOUS; SUBCUTANEOUS
Status: DISCONTINUED | OUTPATIENT
Start: 2024-08-16 | End: 2024-08-16 | Stop reason: HOSPADM

## 2024-08-16 RX ORDER — ONDANSETRON 4 MG/1
4-8 TABLET, ORALLY DISINTEGRATING ORAL EVERY 8 HOURS PRN
Qty: 10 TABLET | Refills: 0 | Status: SHIPPED | OUTPATIENT
Start: 2024-08-16

## 2024-08-16 RX ORDER — CLINDAMYCIN PHOSPHATE 900 MG/50ML
900 INJECTION, SOLUTION INTRAVENOUS
Status: COMPLETED | OUTPATIENT
Start: 2024-08-16 | End: 2024-08-16

## 2024-08-16 RX ORDER — PROPOFOL 10 MG/ML
INJECTION, EMULSION INTRAVENOUS CONTINUOUS PRN
Status: DISCONTINUED | OUTPATIENT
Start: 2024-08-16 | End: 2024-08-16

## 2024-08-16 RX ORDER — HYDROXYZINE PAMOATE 25 MG/1
100 CAPSULE ORAL AT BEDTIME
Status: DISCONTINUED | OUTPATIENT
Start: 2024-08-16 | End: 2024-08-17 | Stop reason: HOSPADM

## 2024-08-16 RX ORDER — HYDROMORPHONE HCL IN WATER/PF 6 MG/30 ML
0.4 PATIENT CONTROLLED ANALGESIA SYRINGE INTRAVENOUS EVERY 5 MIN PRN
Status: DISCONTINUED | OUTPATIENT
Start: 2024-08-16 | End: 2024-08-16 | Stop reason: HOSPADM

## 2024-08-16 RX ORDER — PROCHLORPERAZINE MALEATE 10 MG
10 TABLET ORAL EVERY 6 HOURS PRN
Status: DISCONTINUED | OUTPATIENT
Start: 2024-08-16 | End: 2024-08-17 | Stop reason: HOSPADM

## 2024-08-16 RX ORDER — ONDANSETRON 2 MG/ML
4 INJECTION INTRAMUSCULAR; INTRAVENOUS EVERY 6 HOURS PRN
Status: DISCONTINUED | OUTPATIENT
Start: 2024-08-16 | End: 2024-08-17 | Stop reason: HOSPADM

## 2024-08-16 RX ORDER — GABAPENTIN 300 MG/1
300 CAPSULE ORAL
Status: COMPLETED | OUTPATIENT
Start: 2024-08-16 | End: 2024-08-16

## 2024-08-16 RX ORDER — ATORVASTATIN CALCIUM 20 MG/1
20 TABLET, FILM COATED ORAL AT BEDTIME
Status: DISCONTINUED | OUTPATIENT
Start: 2024-08-16 | End: 2024-08-17 | Stop reason: HOSPADM

## 2024-08-16 RX ORDER — DEXAMETHASONE SODIUM PHOSPHATE 4 MG/ML
INJECTION, SOLUTION INTRA-ARTICULAR; INTRALESIONAL; INTRAMUSCULAR; INTRAVENOUS; SOFT TISSUE PRN
Status: DISCONTINUED | OUTPATIENT
Start: 2024-08-16 | End: 2024-08-16

## 2024-08-16 RX ORDER — LIDOCAINE HYDROCHLORIDE 20 MG/ML
INJECTION, SOLUTION INFILTRATION; PERINEURAL PRN
Status: DISCONTINUED | OUTPATIENT
Start: 2024-08-16 | End: 2024-08-16

## 2024-08-16 RX ORDER — HYDRALAZINE HYDROCHLORIDE 20 MG/ML
10 INJECTION INTRAMUSCULAR; INTRAVENOUS EVERY 4 HOURS PRN
Status: DISCONTINUED | OUTPATIENT
Start: 2024-08-16 | End: 2024-08-17 | Stop reason: HOSPADM

## 2024-08-16 RX ORDER — HYDROMORPHONE HCL IN WATER/PF 6 MG/30 ML
0.4 PATIENT CONTROLLED ANALGESIA SYRINGE INTRAVENOUS
Status: DISCONTINUED | OUTPATIENT
Start: 2024-08-16 | End: 2024-08-16

## 2024-08-16 RX ORDER — HYDROMORPHONE HCL IN WATER/PF 6 MG/30 ML
0.2 PATIENT CONTROLLED ANALGESIA SYRINGE INTRAVENOUS EVERY 4 HOURS PRN
Status: DISCONTINUED | OUTPATIENT
Start: 2024-08-16 | End: 2024-08-17 | Stop reason: HOSPADM

## 2024-08-16 RX ORDER — PROPOFOL 10 MG/ML
INJECTION, EMULSION INTRAVENOUS PRN
Status: DISCONTINUED | OUTPATIENT
Start: 2024-08-16 | End: 2024-08-16

## 2024-08-16 RX ADMIN — MORPHINE SULFATE 15 MG: 15 TABLET ORAL at 16:59

## 2024-08-16 RX ADMIN — DEXMEDETOMIDINE HYDROCHLORIDE 0.3 MCG/KG/HR: 200 INJECTION INTRAVENOUS at 07:45

## 2024-08-16 RX ADMIN — Medication 1 CAPSULE: at 11:59

## 2024-08-16 RX ADMIN — FENTANYL CITRATE 50 MCG: 50 INJECTION, SOLUTION INTRAMUSCULAR; INTRAVENOUS at 09:41

## 2024-08-16 RX ADMIN — PROPOFOL 150 MCG/KG/MIN: 10 INJECTION, EMULSION INTRAVENOUS at 07:45

## 2024-08-16 RX ADMIN — FAMOTIDINE 40 MG: 20 TABLET, FILM COATED ORAL at 21:09

## 2024-08-16 RX ADMIN — HYDROMORPHONE HYDROCHLORIDE 0.2 MG: 0.2 INJECTION, SOLUTION INTRAMUSCULAR; INTRAVENOUS; SUBCUTANEOUS at 14:14

## 2024-08-16 RX ADMIN — ONDANSETRON 4 MG: 2 INJECTION INTRAMUSCULAR; INTRAVENOUS at 08:37

## 2024-08-16 RX ADMIN — ACETAMINOPHEN 325MG 975 MG: 325 TABLET ORAL at 22:33

## 2024-08-16 RX ADMIN — DEXAMETHASONE SODIUM PHOSPHATE 8 MG: 4 INJECTION, SOLUTION INTRA-ARTICULAR; INTRALESIONAL; INTRAMUSCULAR; INTRAVENOUS; SOFT TISSUE at 07:55

## 2024-08-16 RX ADMIN — LISINOPRIL 5 MG: 5 TABLET ORAL at 12:00

## 2024-08-16 RX ADMIN — HYDROXYZINE PAMOATE 100 MG: 25 CAPSULE ORAL at 22:33

## 2024-08-16 RX ADMIN — ACETAMINOPHEN 325MG 975 MG: 325 TABLET ORAL at 14:00

## 2024-08-16 RX ADMIN — ATORVASTATIN CALCIUM 20 MG: 20 TABLET, FILM COATED ORAL at 21:09

## 2024-08-16 RX ADMIN — SUGAMMADEX 200 MG: 100 INJECTION, SOLUTION INTRAVENOUS at 09:00

## 2024-08-16 RX ADMIN — HYDROMORPHONE HYDROCHLORIDE 0.5 MG: 1 INJECTION, SOLUTION INTRAMUSCULAR; INTRAVENOUS; SUBCUTANEOUS at 08:36

## 2024-08-16 RX ADMIN — POLYETHYLENE GLYCOL 3350 17 G: 17 POWDER, FOR SOLUTION ORAL at 14:44

## 2024-08-16 RX ADMIN — MORPHINE SULFATE 15 MG: 15 TABLET ORAL at 21:27

## 2024-08-16 RX ADMIN — FAMOTIDINE 40 MG: 20 TABLET, FILM COATED ORAL at 11:59

## 2024-08-16 RX ADMIN — PHENYLEPHRINE HYDROCHLORIDE 100 MCG: 10 INJECTION INTRAVENOUS at 08:26

## 2024-08-16 RX ADMIN — LIDOCAINE 2 PATCH: 4 PATCH TOPICAL at 19:07

## 2024-08-16 RX ADMIN — Medication 1 CAPSULE: at 21:09

## 2024-08-16 RX ADMIN — Medication 20 MG: at 08:26

## 2024-08-16 RX ADMIN — MIDAZOLAM 2 MG: 1 INJECTION INTRAMUSCULAR; INTRAVENOUS at 07:30

## 2024-08-16 RX ADMIN — HYDROMORPHONE HYDROCHLORIDE 0.2 MG: 0.2 INJECTION, SOLUTION INTRAMUSCULAR; INTRAVENOUS; SUBCUTANEOUS at 19:07

## 2024-08-16 RX ADMIN — PHENYLEPHRINE HYDROCHLORIDE 100 MCG: 10 INJECTION INTRAVENOUS at 07:57

## 2024-08-16 RX ADMIN — ROCURONIUM BROMIDE 50 MG: 50 INJECTION, SOLUTION INTRAVENOUS at 07:45

## 2024-08-16 RX ADMIN — PROPOFOL 200 MG: 10 INJECTION, EMULSION INTRAVENOUS at 07:45

## 2024-08-16 RX ADMIN — CLINDAMYCIN PHOSPHATE 900 MG: 900 INJECTION, SOLUTION INTRAVENOUS at 06:53

## 2024-08-16 RX ADMIN — PHENYLEPHRINE HYDROCHLORIDE 0.3 MCG/KG/MIN: 10 INJECTION INTRAVENOUS at 08:23

## 2024-08-16 RX ADMIN — LIDOCAINE HYDROCHLORIDE 100 MG: 20 INJECTION, SOLUTION INFILTRATION; PERINEURAL at 07:45

## 2024-08-16 RX ADMIN — FENTANYL CITRATE 25 MCG: 50 INJECTION INTRAMUSCULAR; INTRAVENOUS at 07:45

## 2024-08-16 RX ADMIN — SODIUM CHLORIDE, POTASSIUM CHLORIDE, SODIUM LACTATE AND CALCIUM CHLORIDE: 600; 310; 30; 20 INJECTION, SOLUTION INTRAVENOUS at 06:46

## 2024-08-16 RX ADMIN — HYDROXYZINE HYDROCHLORIDE 25 MG: 25 TABLET, FILM COATED ORAL at 18:34

## 2024-08-16 RX ADMIN — PHENYLEPHRINE HYDROCHLORIDE 100 MCG: 10 INJECTION INTRAVENOUS at 08:08

## 2024-08-16 RX ADMIN — APREPITANT 40 MG: 40 CAPSULE ORAL at 06:51

## 2024-08-16 RX ADMIN — Medication 30 MG: at 07:55

## 2024-08-16 RX ADMIN — ACETAMINOPHEN 975 MG: 325 TABLET, FILM COATED ORAL at 06:51

## 2024-08-16 ASSESSMENT — ACTIVITIES OF DAILY LIVING (ADL)
ADLS_ACUITY_SCORE: 23
ADLS_ACUITY_SCORE: 20
ADLS_ACUITY_SCORE: 20
ADLS_ACUITY_SCORE: 23
ADLS_ACUITY_SCORE: 20
ADLS_ACUITY_SCORE: 23
ADLS_ACUITY_SCORE: 20
ADLS_ACUITY_SCORE: 23
ADLS_ACUITY_SCORE: 20
ADLS_ACUITY_SCORE: 20
ADLS_ACUITY_SCORE: 23

## 2024-08-16 ASSESSMENT — COPD QUESTIONNAIRES: COPD: 0

## 2024-08-16 ASSESSMENT — LIFESTYLE VARIABLES: TOBACCO_USE: 1

## 2024-08-16 NOTE — ANESTHESIA PROCEDURE NOTES
Airway       Patient location during procedure: OR ([facility] - Operating Room or Procedural Area)       Procedure Start/Stop Times: 8/16/2024 7:47 AM  Staff -        Anesthesiologist:  Kee Bernal MD       CRNA: Annie Garcia APRN CRNA       Performed By: CRNA  Consent for Airway        Urgency: elective  Indications and Patient Condition       Indications for airway management: deepak-procedural       Induction type:intravenous       Mask difficulty assessment: 1 - vent by mask    Final Airway Details       Final airway type: endotracheal airway       Successful airway: ETT - single  Endotracheal Airway Details        ETT size (mm): 7.0       Cuffed: yes       Successful intubation technique: video laryngoscopy       VL Blade Size: Glidescope 3       Grade View of Cords: 1       Adjucts: stylet       Position: Right       Measured from: gums/teeth       Secured at (cm): 20       Bite block used: None    Post intubation assessment        Number of attempts at approach: 1       Number of other approaches attempted: 0       Secured with: tape       Ease of procedure: easy       Dentition: Intact and Unchanged    Medication(s) Administered   Medication Administration Time: 8/16/2024 7:47 AM
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
## 2024-08-16 NOTE — PLAN OF CARE
Goal Outcome Evaluation:  Patient vital signs are at baseline: VSS   Patient able to ambulate as they were prior to admission or with assist devices provided by therapies during their stay: Yes 1 assist  gbw    Patient MUST void prior to discharge: Yes    Patient able to tolerate oral intake: Yes    Pain has adequate pain control using Oral analgesics:Yes Morphine     Does patient have an identified :Yes     Has goal D/C date and time been discussed with patient:Pending discharge  Give pt IV dilaudid 0.2 , pt wants to take morphine 15 mg with 0.2 iv dilaudid together with respiratory rates of 10 , she is on 2 lpm  via NC and capon . PVSL , on regular diet. Voided on shift

## 2024-08-16 NOTE — BRIEF OP NOTE
[facility]    Brief Operative Note    Pre-operative diagnosis: Lumbar radiculopathy [M54.16]  Post-operative diagnosis Same as pre-operative diagnosis    Procedure: Lumbar 4 to lumbar 5 laminectomy, N/A - Spine    Surgeon: Surgeons and Role:     * Kem Brooks MD - Primary     * Jacy Osborne, NP - Assisting  Anesthesia: General   Estimated Blood Loss: 25ml     Drains: None  Findings: See op note     Jacy Osborne, CNP  [facility] Neurosurgery  Tel [phone]  Pager [phone]

## 2024-08-16 NOTE — CONSULTS
"[facility] ACUTE INPATIENT PAIN SERVICE    [facility], [facility], [facility], [facility], [place]   PAIN CONSULT     Requesting provider: Elke Ely PA-C  Reason for consult: chronic pain meds post op assistance     Assessment/Plan:  Keshia Arellano is a 63 year old female with a past medical history of chronic pain and migraines who was admitted on 8/16/2024 for planned L4-L5 laminectomy.  reviewed on 8/16/24 and shows routine fills of zolpidem and morphine sulfare IR 15mg tab.     She is currently POD0 and describes her pain as \"aching, sore, deep, constant\".    PLAN:  Acute lower back pain secondary to laminectomy in the setting of chronic pain and opioid use.     Multimodal Medication Therapy:   Adjuvants:   Hydroxizine 100mg at hs   Atarax 25mg q6h prn   Baclofen 10mg daily prn   Tylenol 975mg q8h ordered  Lidocaine patch x2 ordered  Opioids: Morphine IR 15mg increased to q4h prn , oxycodone discontinued  IV dilaudid decreased to 0.2mg q4h prn for severe breakthrough pain only   Non-medication interventions- Finds ice helpful to surgical site   Constipation Prophylaxis- scheduled miralax ordered  Follow up /Discharge Recommendations - We recommend prescribing the following at the time of discharge:     Resume PTA medications   Per neurosurgery      Subjective:  Keshia was seen resting in bed shortly after arriving to the unit following her surgery. She is endorsing 8/10 pain to her surgical site that feels \"aching, sore, deep, and constant\". She also endorses a history of chronic pain due to multiple L hip replacements, her first one was when she was 16 years old. Her most recent hip replacement revision left her with more significant pain.  She reports the pain \"jumps around\" sometimes to the hip, sometimes to the chest, sometimes to the R side due to leg length discrepancy and feeling like she overcompensates on the right.     She is followed by Natacha Weber CNP at [facility] " "for her chronic pain.    She currently manages her chronic pain at home with 15mg morphine IR 4x/day. She reports her first dose is typically around noon, and then takes the rest about four hours apart. She also uses medical marijuana (vapes) which has significantly helped with muscle spasms. She will occasionally take baclofen if needed. She does not find it overly helpful, but has also tried other muscle relaxers with minimal relief. Prefers to keep baclofen while in the hospital over trying rotation to robaxin.     Discussed increasing morphine to 6 doses/day 4 hours apart while she is in the acute post op period, as well as use of tylenol and lidocaine patches. Patient in agreement with the plan.          History   Drug Use    Types: Marijuana     Comment: last use 8/15/24 @ 2130h         Tobacco Use      Smoking status: Former        Packs/day: 0.00        Years: 1.5 packs/day for 18.0 years (27.0 ttl pk-yrs)        Types: Cigarettes        Start date: 1977        Quit date: 1995        Years since quittin.9        Passive exposure: Past      Smokeless tobacco: Never      Objective:  Vital signs in last 24 hours:  B/P: 107/55, T: 98.1, P: 72, R: 14   Blood pressure 107/55, pulse 72, temperature 98.1  F (36.7  C), temperature source Oral, resp. rate 14, height 1.651 m (5' 5\"), weight 89 kg (196 lb 4.8 oz), SpO2 96%.      Review of Systems:   As per subjective, all others negative.    Physical Exam  General: in no apparent distress, laying in bed, appears comfortable  HEENT: Head normocephalic atraumatic, oral mucosa moist. Sclerae anicteric  Resp: Respirations unlabored  Skin: No rashes or lesions to visualized skin, surgical dressing intact with scant drainage  Extremities: No peripheral edema, able to move all four extremities spontaneously   Psych: Normal affect, pleasant  Neuro: Alert and oriented x3       Imaging:  Personally Reviewed.    Results for orders placed or performed during the " hospital encounter of 08/16/24   XR Surgery ASHLEY L/T 5 Min Fluoro w Stills    Impression    IMPRESSION: Multiple fluoroscopic spot images of the lumbar spine were  obtained in lateral projection for localization purposes during the  patient's operation. There is transitional anatomy at the lumbosacral  junction, as seen to better advantage on the previous MRI exam. For  the purposes of this report, I'll presume that there are five lumbar  vertebral bodies as well as a transitional partially lumbarized S1  vertebra with a prominent S1-S2 intervertebral disc space. Utilizing  this nomenclature, on image #5, a linear metallic probe with slightly  curved tip is seen projecting over the region of the L4-L5 facet joint  (this was discussed by Dr. Stafford with Dr. Brooks by phone at  approximately 8:17 AM 8/16/2024, who agreed with this assessment). On  image #6, surgical retractors are seen projecting over the posterior  lumbar soft tissues. There is a linear surgical instrument with a  curved tip projecting over the posterior margin of the L4 pedicle in  the region of the L4 pars interarticularis. Please refer to the  operative report from spine surgery for additional details and  real-time findings.     LEROY STAFFORD MD         SYSTEM ID:  [id number]        Lab Results:  Personally Reviewed.   Last Comprehensive Metabolic Panel:  Sodium   Date Value Ref Range Status   01/18/2018 142 133 - 144 mmol/L Final     Potassium   Date Value Ref Range Status   08/16/2024 3.8 3.4 - 5.3 mmol/L Final   01/18/2018 3.5 3.4 - 5.3 mmol/L Final     Chloride   Date Value Ref Range Status   01/17/2018 109 94 - 109 mmol/L Final     Carbon Dioxide   Date Value Ref Range Status   01/17/2018 28 20 - 32 mmol/L Final     Anion Gap   Date Value Ref Range Status   01/17/2018 9 3 - 14 mmol/L Final     Glucose   Date Value Ref Range Status   08/16/2024 93 70 - 99 mg/dL Final   01/17/2018 89 70 - 99 mg/dL Final     Urea Nitrogen   Date Value Ref Range  Status   01/17/2018 13 7 - 30 mg/dL Final     Creatinine   Date Value Ref Range Status   08/16/2024 0.73 0.51 - 0.95 mg/dL Final   01/17/2018 0.59 0.52 - 1.04 mg/dL Final     GFR Estimate   Date Value Ref Range Status   08/16/2024 >90 >60 mL/min/1.73m2 Final     Comment:     eGFR calculated using 2021 CKD-EPI equation.   01/17/2018 >90 >60 mL/min/1.7m2 Final     Comment:     Non  GFR Calc     Calcium   Date Value Ref Range Status   01/17/2018 8.9 8.5 - 10.1 mg/dL Final        UA:   Amphetamine Qual Urine   Date Value Ref Range Status   01/16/2018 Negative NEG^Negative Final     Comment:     Cutoff for a negative amphetamine is 500 ng/mL or less.     Barbiturates Qual Urine   Date Value Ref Range Status   01/16/2018 Negative NEG^Negative Final     Comment:     Cutoff for a negative barbiturate is 200 ng/mL or less.     Barbiturates Urine   Date Value Ref Range Status   10/29/2021 Screen Negative Screen Negative Final     Comment:     Cutoff for a negative barbiturate is 200 ng/mL or less.     Benzodiazepine Qual Urine   Date Value Ref Range Status   01/16/2018 Negative NEG^Negative Final     Comment:     Cutoff for a negative benzodiazepine is 200 ng/mL or less.     Cannabinoids Qual Urine   Date Value Ref Range Status   01/16/2018 Positive (A) NEG^Negative Final     Comment:     Cutoff for a positive cannabinoid is greater than 50 ng/mL. This is an   unconfirmed screening result to be used for medical purposes only.       Cannabinoids Urine   Date Value Ref Range Status   10/29/2021 Screen Positive (A) Screen Negative Final     Comment:     Drug Screening Threshold     THC Metabolite           50 ng/mL     Screening results are to be used only for medical purposes.   Unconfirmed screening results must not be used for non-   medical purposes.     Cocaine Qual Urine   Date Value Ref Range Status   01/16/2018 Negative NEG^Negative Final     Comment:     Cutoff for a negative cocaine is 300 ng/mL or  less.     Opiates Qualitative Urine   Date Value Ref Range Status   01/16/2018 Positive (A) NEG^Negative Final     Comment:     Cutoff for a positive opiate is greater than 300 ng/mL. This is an unconfirmed   screening result to be used for medical purposes only.            Please see A&P for additional details of medical decision making.  MANAGEMENT DISCUSSED with the following over the past 24 hours:   -Neurosurgery: reviewed plan of care, updates   NOTE(S)/MEDICAL RECORDS REVIEWED over the past 24 hours:   -Neurosurgery: procedure reviewed   -Hospitalist: MAGI VITALE  Tests ORDERED & REVIEWED in the past 24 hours:  -LFTs  Tests REVIEWED in the past 24 hours:  - BMP  Medical complexity over the past 24 hours:  - Parenteral (IV) CONTROLLED SUBSTANCES ordered  - Prescription DRUG MANAGEMENT performed      CHANTEL Bennett-BC  Acute Care Pain Management Program   Hours of pain coverage Mon-Fri [phone], afterhours please call the house officer   [facility] ([place], [place], [place], [place])   Page via QuickProNotes- Click HERE to page Divina or Chastity text web console -Click for Chastity

## 2024-08-16 NOTE — ANESTHESIA POSTPROCEDURE EVALUATION
Patient: Keshia Arellano    Procedure: Procedure(s):  Lumbar 4 to lumbar 5 laminectomy       Anesthesia Type:  General    Note:  Disposition: Inpatient   Postop Pain Control: Uneventful            Sign Out: Well controlled pain   PONV:    Neuro/Psych: Uneventful            Sign Out: Acceptable/Baseline neuro status   Airway/Respiratory: Uneventful            Sign Out: Acceptable/Baseline resp. status   CV/Hemodynamics: Uneventful            Sign Out: Acceptable CV status   Other NRE: NONE   DID A NON-ROUTINE EVENT OCCUR? No           Last vitals:  Vitals Value Taken Time   BP 97/53 08/16/24 1000   Temp 36.3  C (97.4  F) 08/16/24 0945   Pulse 56 08/16/24 1002   Resp 24 08/16/24 1002   SpO2 95 % 08/16/24 1002   Vitals shown include unfiled device data.    Electronically Signed By: Kee Bernal MD  August 16, 2024  10:03 AM

## 2024-08-16 NOTE — ANESTHESIA CARE TRANSFER NOTE
Patient: Keshia Arellano    Procedure: Procedure(s):  Lumbar 4 to lumbar 5 laminectomy       Diagnosis: Lumbar radiculopathy [M54.16]  Diagnosis Additional Information: No value filed.    Anesthesia Type:   General     Note:    Oropharynx: oropharynx clear of all foreign objects and spontaneously breathing  Level of Consciousness: awake  Oxygen Supplementation: face mask  Level of Supplemental Oxygen (L/min / FiO2): 6  Independent Airway: airway patency satisfactory and stable  Dentition: dentition unchanged  Vital Signs Stable: post-procedure vital signs reviewed and stable  Report to RN Given: handoff report given  Patient transferred to: PACU  Comments: Neuromuscular blockade reversed with sugammadex, spontaneous respirations, adequate tidal volumes, followed commands to voice, oropharynx suctioned with soft flexible catheter, extubated atraumatically, extubated with suction, airway patent after extubation.  Oxygen via facemask at 6 liters per minute to PACU. Oxygen tubing connected to wall O2 in PACU, SpO2, NiBP, and EKG monitors and alarms on and functioning, Negin Hugger warmer connected to patient gown, report on patient's clinical status given to PACU RN, RN questions answered.         Handoff Report: Identifed the Patient, Identified the Reponsible Provider, Reviewed the pertinent medical history, Discussed the surgical course, Reviewed Intra-OP anesthesia mangement and issues during anesthesia, Set expectations for post-procedure period and Allowed opportunity for questions and acknowledgement of understanding      Vitals:  Vitals Value Taken Time   BP 97/53 08/16/24 1000   Temp 36.3  C (97.4  F) 08/16/24 0945   Pulse 57 08/16/24 1004   Resp 49 08/16/24 1004   SpO2 95 % 08/16/24 1004   Vitals shown include unfiled device data.    Electronically Signed By: DOMINGA Murphy CRNA  August 16, 2024  10:06 AM

## 2024-08-16 NOTE — CONSULTS
"[facility]  Hospitalist Consult Note  Dariel Molina MD       Keshia Arellano MRN# [id number]   YOB: 1960 Age: 63 year old      Date of Admission:  8/16/2024         Assessment and Plan:   Keshia Arellano is a 63 year old female with PMH significant for hypertension, dyslipidemia, GERD, migraine, cyclical vomiting syndrome, paroxysmal atrial fibrillation (not on anticoagulation), chronic back pain, lumbar spinal stenosis, h/o medical marijuana use who is admitted to neurosurgery for lumbar spinal laminectomy and decompression 8/16/24.    Hospital service consulted for postop medical management    Lumbar spinal stenosis, s/p L4-5 bilateral laminectomy and medial facetectomy for decompression of central stenosis (8/16/24)  -postop cares including pain control and DVT prophylaxis per primary service  -pain team consulted as well    Hypertension  Dyslipidemia  paroxysmal atrial fibrillation  -PTA on aspirin, lisinopril, Toprol-XL  -has history of atrial fibrillation noted in 2018 during one of the cyclical vomiting episodes and per patient has had no recurrence; not on anticoagulation; takes aspirin daily  -resume aspirin when okay with surgery  -will continue PTA lisinopril and Toprol-XL with hold parameters  -continue PTA Lipitor  -hydralazine IV PRN for SBP> 180    Depression  PTSD  -continue PTA Pristiq    Migraine  -PRN Imitrex    H/o cyclical vomiting syndrome  -got a dose of Aprepitant preop  -can have PRN antiemetics    Clinically Significant Risk Factors Present on Admission                  # Drug Induced Platelet Defect: home medication list includes an antiplatelet medication       # Hypertension: Home medication list includes antihypertensive(s)             # Obesity: Estimated body mass index is 32.67 kg/m  as calculated from the following:    Height as of this encounter: 1.651 m (5' 5\").    Weight as of this encounter: 89 kg (196 lb 4.8 oz).           DVT prophylaxis: " per primary  Code status: full code    Care plan discussed with patient and pain team           Primary Care Physician:   Sandy Archer [phone]         Chief Complaint:     Postop medical management    History is obtained from the patient         History of Present Illness:     Keshia Arellano is a 63 year old female with PMH significant for hypertension, dyslipidemia, GERD, migraine, cyclical vomiting syndrome, paroxysmal atrial fibrillation (not on anticoagulation), chronic back pain, lumbar spinal stenosis, h/o medical marijuana use who is admitted to neurosurgery for lumbar spinal laminectomy and decompression 8/16/24.    Hospital service consulted for postop medical management.    Patient reports some postoperative discomfort but denies any other active complaints.    The patient denies any fever, chills, rigors, chest pain or shortness of breath.  Denies pain abdomen.  No bowel or bladder disturbances.           Past Medical History:     Hypertension  Dyslipidemia  GERD  Migraine  cyclical vomiting syndrome  paroxysmal atrial fibrillation (not on anticoagulation)  chronic back pain  lumbar spinal stenosis  h/o medical marijuana           Past Surgical History:     Past Surgical History:   Procedure Laterality Date    BONE GRAFTING SURGERY  1995    CLOSED REDUCTION WRIST  09/01/2001    FISTULOTOMY RECTUM  05/01/2009    FISTULOTOMY: EXCISION OF ANAL TAG  05/29/2009    FRACTURE SURGERY      FRACTURE TX, WRIST RT/LT      GRAFT BONE FROM ILIAC CREST  08/16/1995    to femur    HYSTERECTOMY, CECILIA  01/10/2006    fibroids (has ovaries)    LUMBAR SPINE SURGERY  04/2008    L5-S1    [facility]      MD LAMNOTMY INCL W/DCMPRSN NRV ROOT 1 INTRSPC LUMBR      Description: Hemilaminect Decompress Part Facetectomy 1 Lumbar Interspace;  Recorded: 10/04/2011;    WISDOM TEETH EXTRACTION  1980    [facility] TOTAL ABDOM HYSTERECTOMY      Description: Total Abdominal Hysterectomy;  Recorded: 10/04/2011;    [facility] TOTAL HIP ARTHROPLASTY Left  1977, , 2004    x 3              Home Medications:     Prior to Admission Medications   Prescriptions Last Dose Informant Patient Reported? Taking?   ACIDOPHILUS OR 2024 at 2300 Self Yes Yes   Si tablet daily   Coenzyme Q10 300 MG CAPS 2024 at pm Self Yes Yes   Sig: Take 300 mg by mouth 2 times daily (   MAGNESIUM CITRATE PO 2024 at 2300 Self Yes Yes   Sig: Take 400 mg by mouth daily   S-Adenosylmethionine (SAME) 400 MG TABS 2024 at am Self Yes Yes   Sig: Take 400 mg by mouth daily   SUMAtriptan (IMITREX) 25 MG tablet  at PRN Self No Yes   Sig: Take 1 tablet (25 mg) by mouth at onset of headache for migraine May repeat in 2 hours. Max 8 tablets/24 hours.   UNABLE TO FIND 2024 at PRN Self Yes Yes   Sig: Glycine 500 mg, up to 1003-0759 mg orally at HS   aspirin 81 MG EC tablet 2024 at 1000 Self Yes No   Sig: Take 1 tablet by mouth daily   atorvastatin (LIPITOR) 20 MG tablet  at 2300 Self Yes Yes   Sig: Take 20 mg by mouth at bedtime   baclofen (LIORESAL) 10 MG tablet More than a month at PRN Self Yes Yes   Sig: Take 10 mg by mouth as needed for muscle spasms   celecoxib (CELEBREX) 200 MG capsule 2024 at 2300 Self Yes No   Sig: Take 200 mg by mouth daily   desvenlafaxine (PRISTIQ) 50 MG 24 hr tablet 2024 at 0330 Self Yes Yes   Sig: Take 50 mg by mouth daily   famotidine (PEPCID) 40 MG tablet 8/15/2024 at 2300 Self Yes Yes   Sig: Take 40 mg by mouth 2 times daily   fish oil-omega-3 fatty acids 1000 MG capsule 2024 at 2300 Self Yes Yes   Sig: Take 2 g by mouth daily.   fluticasone (FLONASE) 50 MCG/ACT nasal spray  at PRN Self Yes Yes   Sig: Spray 1 spray into both nostrils daily   glutamine 500 MG capsule 2024 at am Self Yes Yes   Si,000-3,000 mg daily   hydrOXYzine (VISTARIL) 50 MG capsule 2024 at 2300 Self Yes Yes   Sig: Take 100 mg by mouth at bedtime ([id number])   lisinopril (ZESTRIL) 5 MG tablet 8/15/2024 at 1000 Self Yes Yes   Sig: Take 5 mg by mouth  daily   medical cannabis (Patient's own supply.  Not a prescription) 8/15/2024 at 2130 Self Yes Yes   Si capsule (This is NOT a prescription, and does not certify that the patient has a qualifying medical condition for medical cannabis.  The purpose of this order is  to document that the patient reports taking medical cannabis.)   Vaping; 1-4 puffs inhaled up to 3 times a day as needed  Lotion; 1 application, topical as needed   metoprolol succinate ER (TOPROL-XL) 25 MG 24 hr tablet 2024 at 0430 Self Yes Yes   Sig: Take 25 mg by mouth   morphine (MSIR) 15 MG IR tablet  at midnight Self No Yes   Sig: Take 1 tablet (15 mg) by mouth 4 times daily May fill 8/15 for    naloxone (NARCAN) 4 MG/0.1ML nasal spray  at PRN Self Yes Yes   Sig: Spray 4 mg into one nostril alternating nostrils as needed for opioid reversal every 2-3 minutes until assistance arrives   ondansetron (ZOFRAN-ODT) 8 MG ODT tab More than a month at PRN Self Yes Yes   Sig: Place 8 mg under the tongue every 8 hours as needed    polyethylene glycol (MIRALAX) 17 GM/Dose powder  at pm Self Yes No   Sig: take (17G)  by oral route  as needed mixed with 8 oz. water, juice, soda, coffee or tea   prochlorperazine (COMPAZINE) 5 MG tablet More than a month at PRN Self Yes Yes   Sig: TAKE 1 TABLET BY MOUTH UP TO EVERY 4 HOURS AS NEEDED FOR NAUSEA OR VOMITING   triamcinolone (KENALOG) 0.1 % external ointment  at PRN Self Yes Yes   Sig: Apply topically as needed for irritation   vitamin B-Complex 2024 at 2300 Self Yes Yes   Sig: Take 1 tablet by mouth daily   vitamin C (ASCORBIC ACID) 1000 MG TABS 2024 at 2200 Self Yes Yes   Sig: Take 1,000 mg by mouth 2 times daily   zolpidem (AMBIEN) 10 MG tablet 2024 at 2300 Self Yes Yes      Facility-Administered Medications: None            Allergies:     Allergies   Allergen Reactions    Albuterol      Pt Doesn't Remember, Paresthesia Sensation affected      Turmeric Other (See Comments)    Biaxin  [Clarithromycin]     Clavulanic Acid Itching    Clindamycin Phosphate Diarrhea and GI Disturbance     Potentially c.diff    Clorazepate     Penicillins Nausea and Vomiting    Perfume Dizziness, Headache and Cough     swollen nasal passages, post-nasal drip    Plavix [Clopidogrel]      FYI If patient needs in the future: Patient is a 2C19 rapid metabolizer, which would result in more rapid formulation of clopidogrel's active metabolite. Theoretically could increase risk for bleeding but clinical significance may vary    Proventil [Albuterol Sulfate]     Tobramycin Unknown    Amoxicillin Itching and Rash    Tape [Adhesive Tape] Itching and Rash     Needs paper tape    Vancomycin Rash            Social History:   Keshia Arellano  reports that she quit smoking about 28 years ago. Her smoking use included cigarettes. She started smoking about 46 years ago. She has a 27 pack-year smoking history. She has been exposed to tobacco smoke. She has never used smokeless tobacco. She reports that she does not currently use alcohol. She reports current drug use. Drug: Marijuana.              Family History:   Keshia Arellano family history includes Alcohol/Drug in her brother, sister, and another family member; Allergies in her father; Arthritis in her mother and sister; Breast Cancer in her mother; Cancer in her father, maternal grandmother, and mother; Cardiovascular in her maternal grandfather and mother; Cerebrovascular Disease in her sister; Depression in her brother, mother, and sister; Diabetes in her mother; Eye Disorder in her mother; Gastrointestinal Disease in her maternal grandmother; Genitourinary Problems in her sister; Heart Disease in her maternal grandfather and mother; Hypertension in her maternal grandfather and mother; Musculoskeletal Disorder in her father; Neurologic Disorder in her father; Obesity in her mother; Prostate Cancer in her father; Psychotic Disorder in her brother, mother, sister, and another  "family member; Snoring in her father.    Family history was reviewed by myself and not pertinent to current presentation.           Review of Systems:   A10 point Review of Systems was done and were negative other than noted in the HPI.             Physical Exam:   Blood pressure 107/55, pulse 72, temperature 98.1  F (36.7  C), temperature source Oral, resp. rate 14, height 1.651 m (5' 5\"), weight 89 kg (196 lb 4.8 oz), SpO2 96%.  196 lbs 4.8 oz        Constitutional: Alert, awake and oriented X 3; lying comfortably in bed in no apparent distress   HEENT: Pupils equal and reactive to light and accomodation, EOMI intact; neck supple no raised JVD or rigidity    Oral cavity: Moist mucosa   Cardiovascular: Normal s1 s2, regular rate and rhythm, no murmur   Lungs: B/l clear to auscultation, no wheezes or crepitations   Abdomen: Soft, nt, nd, no guarding, rigidity or rebound; BS +   LE : No edema   Musculoskeletal: Power 5/5 in all extremities   Neuro: No focal neurological deficits noted, CN II to XII grossly intact   Psychiatry: normal mood and affect  postop dressing noted on lower back             Data:   All new lab and imaging data was reviewed in Epic.   Significant labs and imagings include:    Potassium 3.8, creatinine 0.73; no other labs currently available from this hospitalization             Dariel Molina MD  Hospitalist   "

## 2024-08-16 NOTE — OP NOTE
Date of surgery: August 16, 2024  Surgeon: Kem Brooks MD  Assistant: Jacy Osborne NP  (Note: The assistant was present for and assisted with the entire surgery, and his/her role as an assistant was crucial for aid in positioning, exposure, suctioning, retraction, and closure)    Preoperative diagnosis: Lumbar stenosis  Postoperative diagnosis: Lumbar stenosis    Procedure:  1.  L4-5 bilateral laminectomy and medial facetectomy for decompression of central stenosis  2.  Use of intraoperative microscope and fluoroscopy    EBL: 25 mL    Indications: 63 year old female who presented with intractable back and leg pain.  MRI showed severe stenosis at L4-5 (transitional anatomy).  Underwent non-operative management with failure to improve.  Risks, benefits, indications, and alternatives were discussed with the patient and family in detail.  All of their questions were answered, and they wished to proceed.    Description of surgery: The patient was positioned prone.  Sterile prepping and draping procedures were performed.  Antibiotics were administered and timeout was performed.  A midline lumbar incision was performed.  The monopolar was used to expose the spinous processes, lamina, and medial facets at L4-5.  Fluoroscopy was used to verify the correct level.  The microscope was brought into the field, and under microscopy, the high speed drill was then used to perform bilateral laminectomies and medial facetectomies.  The Kerrison rongeurs were then used to remove the Ligamentum flavum, with decompression of the thecal sac and nerve roots.  Antibiotic irrigation was performed and hemostasis achieved.  The fascia was closed with 0-Vicryl sutures, and the dermal layer was closed with 2-0 vicryl sutures.  There were no intraprocedural complications.

## 2024-08-17 VITALS
BODY MASS INDEX: 32.71 KG/M2 | OXYGEN SATURATION: 99 % | HEIGHT: 65 IN | HEART RATE: 64 BPM | SYSTOLIC BLOOD PRESSURE: 139 MMHG | RESPIRATION RATE: 20 BRPM | DIASTOLIC BLOOD PRESSURE: 74 MMHG | TEMPERATURE: 99 F | WEIGHT: 196.3 LBS

## 2024-08-17 DIAGNOSIS — Z98.890 H/O LUMBOSACRAL SPINE SURGERY: ICD-10-CM

## 2024-08-17 DIAGNOSIS — M54.16 LUMBAR RADICULOPATHY: Primary | ICD-10-CM

## 2024-08-17 LAB — GLUCOSE BLDC GLUCOMTR-MCNC: 110 MG/DL (ref 70–99)

## 2024-08-17 PROCEDURE — 250N000013 HC RX MED GY IP 250 OP 250 PS 637: Performed by: NEUROLOGICAL SURGERY

## 2024-08-17 PROCEDURE — G0378 HOSPITAL OBSERVATION PER HR: HCPCS

## 2024-08-17 PROCEDURE — 250N000013 HC RX MED GY IP 250 OP 250 PS 637

## 2024-08-17 PROCEDURE — 82962 GLUCOSE BLOOD TEST: CPT

## 2024-08-17 PROCEDURE — 96376 TX/PRO/DX INJ SAME DRUG ADON: CPT

## 2024-08-17 PROCEDURE — 250N000011 HC RX IP 250 OP 636

## 2024-08-17 PROCEDURE — 250N000013 HC RX MED GY IP 250 OP 250 PS 637: Performed by: PHYSICIAN ASSISTANT

## 2024-08-17 PROCEDURE — 99215 OFFICE O/P EST HI 40 MIN: CPT | Performed by: STUDENT IN AN ORGANIZED HEALTH CARE EDUCATION/TRAINING PROGRAM

## 2024-08-17 RX ORDER — METHOCARBAMOL 500 MG/1
500 TABLET, FILM COATED ORAL 4 TIMES DAILY PRN
Qty: 40 TABLET | Refills: 0 | Status: SHIPPED | OUTPATIENT
Start: 2024-08-17 | End: 2024-10-03

## 2024-08-17 RX ORDER — HYDROMORPHONE HYDROCHLORIDE 2 MG/1
1-2 TABLET ORAL EVERY 6 HOURS PRN
Qty: 40 TABLET | Refills: 0 | Status: SHIPPED | OUTPATIENT
Start: 2024-08-19

## 2024-08-17 RX ORDER — HYDROMORPHONE HYDROCHLORIDE 2 MG/1
1-2 TABLET ORAL EVERY 6 HOURS PRN
Qty: 12 TABLET | Refills: 0 | Status: SHIPPED | OUTPATIENT
Start: 2024-08-17

## 2024-08-17 RX ORDER — HYDROMORPHONE HYDROCHLORIDE 2 MG/1
2 TABLET ORAL EVERY 6 HOURS PRN
Status: DISCONTINUED | OUTPATIENT
Start: 2024-08-17 | End: 2024-08-17

## 2024-08-17 RX ADMIN — HYDROMORPHONE HYDROCHLORIDE 0.2 MG: 0.2 INJECTION, SOLUTION INTRAMUSCULAR; INTRAVENOUS; SUBCUTANEOUS at 09:06

## 2024-08-17 RX ADMIN — Medication 1 CAPSULE: at 08:50

## 2024-08-17 RX ADMIN — MORPHINE SULFATE 15 MG: 15 TABLET ORAL at 02:16

## 2024-08-17 RX ADMIN — ACETAMINOPHEN 325MG 975 MG: 325 TABLET ORAL at 06:01

## 2024-08-17 RX ADMIN — MORPHINE SULFATE 15 MG: 15 TABLET ORAL at 11:12

## 2024-08-17 RX ADMIN — FAMOTIDINE 40 MG: 20 TABLET, FILM COATED ORAL at 08:50

## 2024-08-17 RX ADMIN — ACETAMINOPHEN 325MG 975 MG: 325 TABLET ORAL at 15:58

## 2024-08-17 RX ADMIN — POLYETHYLENE GLYCOL 3350 17 G: 17 POWDER, FOR SOLUTION ORAL at 08:50

## 2024-08-17 RX ADMIN — MORPHINE SULFATE 15 MG: 15 TABLET ORAL at 16:38

## 2024-08-17 RX ADMIN — HYDROXYZINE HYDROCHLORIDE 25 MG: 25 TABLET, FILM COATED ORAL at 13:24

## 2024-08-17 RX ADMIN — HYDROXYZINE HYDROCHLORIDE 25 MG: 25 TABLET, FILM COATED ORAL at 05:52

## 2024-08-17 RX ADMIN — DESVENLAFAXINE 50 MG: 50 TABLET, EXTENDED RELEASE ORAL at 08:50

## 2024-08-17 ASSESSMENT — ACTIVITIES OF DAILY LIVING (ADL)
ADLS_ACUITY_SCORE: 23
ADLS_ACUITY_SCORE: 23
ADLS_ACUITY_SCORE: 22
ADLS_ACUITY_SCORE: 23
ADLS_ACUITY_SCORE: 22
ADLS_ACUITY_SCORE: 23
ADLS_ACUITY_SCORE: 22
ADLS_ACUITY_SCORE: 22

## 2024-08-17 NOTE — DISCHARGE SUMMARY
[facility]    Discharge Summary   [facility]    Date of Admission:  8/16/2024  Date of Discharge:  8/17/2024  Discharging Provider: Elke Ely PA-C  Date of Service (when I saw the patient): 08/17/24    Discharge Diagnoses   Active Problems:    S/P laminectomy    History of Present Illness   Keshia Arellano is an 63 year old female who presented with intractable back and leg pain.  MRI showed severe stenosis at L4-5 (transitional anatomy).  Underwent non-operative management with failure to improve.  Risks, benefits, indications, and alternatives were discussed with the patient and family in detail.  All of their questions were answered, and they wished to proceed.     Hospital Course   Keshia Arellano was admitted on 8/16/2024 and underwent L4-5 bilateral laminectomy and medial facetectomy for decompression of central stenosis with Dr. Brooks. No intraoperative complications. EBL 25 ml. Patient admitted for pain management, neurologic monitoring, wound care. Patient meeting all discharge on 08/17/2024. Patient was cleared by Neurosurgery for discharge to home in stable condition.      Plan:   - Routine post op care plan  - Routine wound care and activity restrictions  - Pain medications prescribed at discharge- confirmed with pharmacy who agreed with home morphine and dilaudid PRN for breakthrough pain   -Dr. Brooks has approved patient to use her frequency specific microcurrent machine  -Hold ASA and celebrex 1 week post op. May resume vitamins and supplements tomorrow.   - Follow up with NSGY clinic as scheduled     I have discussed the following assessment and plan with Dr. Brooks.    Elke Xiong PA-C  [facility]  [address] [place] [id number]  Tel [phone]  Fax [phone]  Text page via [facility] Paging/Directory    Pending Results   These results will be followed up by n/a  Unresulted Labs Ordered in the Past 30 Days of  this Admission       No orders found for last 31 day(s).            Code Status   Full Code    Primary Care Physician   Sandy Archer    Physical Exam   Temp: 97.1  F (36.2  C) Temp src: Axillary BP: 95/47 Pulse: 57   Resp: 20 SpO2: 96 % O2 Device: None (Room air)    Vitals:    08/16/24 0558   Weight: 89 kg (196 lb 4.8 oz)     Awake, alert,appropriate   Incision c/d/I , dried drainage on dressing   5/5 motor strength BLE  Sensation intact     Time Spent on this Encounter   Elke STROUD PA-C, personally saw the patient today and spent less than or equal to 30 minutes discharging this patient.    Discharge Disposition   Discharged to home  Condition at discharge: Stable    Consultations This Hospital Stay   HOSPITALIST IP CONSULT  PAIN MANAGEMENT ADULT IP CONSULT  VASCULAR ACCESS ADULT IP CONSULT    Discharge Orders      Reason for your hospital stay    Lumbar 4 to lumbar 5 laminectomy     Follow-up and recommended labs and tests     Please follow up at [facility] in 2 weeks and 6 weeks .  You may call the clinic with any scheduling questions or concerns.    [facility]  Tel [phone]     Activity    Your activity upon discharge:     Limit lifting to 10 pounds and limit bending/twisting until follow up visit. Ok to walk as tolerated, avoid bed rest and prolonged sitting. No contact sports or high impact activities until  follow up visit.     Do not take Aspirin or NSAIDS (Ibuprofen, Advil, Aleve, Naproxen, etc) until follow-up visit. Do NOT drive while taking narcotic pain medication.     Wound care and dressings    Instructions to care for your wound at home: Keep your incision clean and dry. Okay to remove dressing on post-op day 2 and shower on post-op day 3. Okay for water to run over incision and gently pat dry. Do not scrub incision. No bathing, swimming, or submerging incision under water until follow-up visit. Call clinic or go to the ER with any  incision drainage or swelling. Follow-up in clinic at 2 weeks post op for incision check.   .     When to call - Contact Surgeon Team    You may experience symptoms that require follow-up before your scheduled appointment. Contact your Surgeon Team if you are concerned about pain control, large amount of bleeding, blood clots, constipation, or if you experience signs of infection (fever, growing tenderness at the surgery site, a large amount of drainage, severe pain, foul-smelling drainage, redness or swelling.     When to call - Reach out to Urgent Care    If you are experiencing uncontrolled Nausea and Vomiting, uncontrolled pain, inability to urinate and uncomfortable, and in need of immediate care, and you are NOT able to reach your Surgeon Team, go to an Urgent Care clinic. Do NOT go to the Emergency Room unless you have shortness of breath, chest pain, or other signs of a medical emergency.     When to call - Reasons to Call 911    Call 911 immediately if you experience sudden-onset chest pain, arm weakness/numbness, slurred speech, or shortness of breath     Symptoms - Fever Management    A low grade fever can be expected after surgery. Your Provider many have prescribed an Opioid pain medication that also contains acetaminophen (TYLENOL) that may help with Fever management.  Do NOT take additional acetaminophen (TYLENOL) in combination with an Opioid/acetaminophen (TYLENOL) product. Read the labels on your Over The Counter (OTC) medications with care.     Symptoms - Reduced Urine Output    If it has been greater than 8 hours since you have urinated despite drinking plenty of water, call your Surgeon Team.     No driving or operating machinery    Do NOT drive any vehicle or operate mechanical equipment for 24 hours following the end of your surgery.  Even though you may feel normal, your reactions may be affected by Anesthesia medication you received.     No Alcohol    Do NOT drink alcoholic beverages for 24  hours following your surgery and while taking pain medications.     Diet Instructions    Follow your surgeon's orders for any diet restrictions.  If you did not receive any diet restrictions, you may drink clear liquids (apple juice, ginger ale, 7-up, broth, etc.), and progress to your regular diet as you feel able. It is important to stay well-hydrated after surgery and drink plenty of water.     Discharge Instructions - Comfort and Pain Management    Pain after surgery is normal and expected. You will have some amount of pain after surgery. Your pain will improve with time. There are several things you can do to help reduce your pain including: rest, ice, and using pain medications as needed. Use pain interventions and don't wait until pain level is out of control. Contact your Surgeon Team if you have pain that persists or worsens after surgery despite rest, ice, and taking your medication(s) as prescribed. You may have a dry mouth, a sore throat, muscles aches or trouble sleeping, and these symptoms should go away after 24 hours.     Discharge Instructions - Rest    Rest and relax for the next 24 hours. Make arrangements to have someone stay with you overnight, and avoid hazardous and strenuous activities.  Do NOT make any important decisions for the next 24 hours.     Monitor and record    Blood pressure: daily and bring record to next appointment.     Discharge Instructions    Please take your blood pressure at home before you take your blood pressure meds a couple day after surgery. If the top number of your blood pressure is less than 95. Please do not take your blood pressure meds.     Diet    Follow this diet upon discharge: Advance to a regular diet as tolerated     Discharge Medications   Current Discharge Medication List        START taking these medications    Details   acetaminophen (TYLENOL) 325 MG tablet Take 2 tablets (650 mg) by mouth every 4 hours as needed for other (mild pain)  Qty: 100 tablet,  Refills: 0    Associated Diagnoses: S/P laminectomy      HYDROmorphone (DILAUDID) 2 MG tablet Take 0.5-1 tablets (1-2 mg) by mouth every 6 hours as needed for pain  Qty: 12 tablet, Refills: 0    Associated Diagnoses: S/P laminectomy      hydrOXYzine HCl (ATARAX) 25 MG tablet Take 1 tablet (25 mg) by mouth every 6 hours as needed for itching or anxiety (with pain, moderate pain)  Qty: 30 tablet, Refills: 0    Associated Diagnoses: S/P laminectomy      methocarbamol (ROBAXIN) 500 MG tablet Take 1 tablet (500 mg) by mouth 4 times daily as needed for muscle spasms  Qty: 40 tablet, Refills: 0    Associated Diagnoses: S/P laminectomy      !! ondansetron (ZOFRAN ODT) 4 MG ODT tab Take 1-2 tablets (4-8 mg) by mouth every 8 hours as needed for nausea Dissolve ON the tongue.  Qty: 10 tablet, Refills: 0    Associated Diagnoses: S/P laminectomy      senna-docusate (SENOKOT-S/PERICOLACE) 8.6-50 MG tablet Take 1 tablet by mouth 2 times daily as needed for constipation  Qty: 20 tablet, Refills: 0    Associated Diagnoses: S/P laminectomy       !! - Potential duplicate medications found. Please discuss with provider.        CONTINUE these medications which have NOT CHANGED    Details   ACIDOPHILUS OR 1 tablet daily      atorvastatin (LIPITOR) 20 MG tablet Take 20 mg by mouth at bedtime      baclofen (LIORESAL) 10 MG tablet Take 10 mg by mouth as needed for muscle spasms      Coenzyme Q10 300 MG CAPS Take 300 mg by mouth 2 times daily (      desvenlafaxine (PRISTIQ) 50 MG 24 hr tablet Take 50 mg by mouth daily      famotidine (PEPCID) 40 MG tablet Take 40 mg by mouth 2 times daily      fish oil-omega-3 fatty acids 1000 MG capsule Take 2 g by mouth daily.      fluticasone (FLONASE) 50 MCG/ACT nasal spray Spray 1 spray into both nostrils daily      glutamine 500 MG capsule 2,000-3,000 mg daily      hydrOXYzine (VISTARIL) 50 MG capsule Take 100 mg by mouth at bedtime ([id number])      lisinopril (ZESTRIL) 5 MG tablet Take 5 mg by  mouth daily      MAGNESIUM CITRATE PO Take 400 mg by mouth daily      medical cannabis (Patient's own supply.  Not a prescription) 1 capsule (This is NOT a prescription, and does not certify that the patient has a qualifying medical condition for medical cannabis.  The purpose of this order is  to document that the patient reports taking medical cannabis.)   Vaping; 1-4 puffs inhaled up to 3 times a day as needed  Lotion; 1 application, topical as needed      metoprolol succinate ER (TOPROL-XL) 25 MG 24 hr tablet Take 25 mg by mouth      morphine (MSIR) 15 MG IR tablet Take 1 tablet (15 mg) by mouth 4 times daily May fill 8/15 for 8/17  Qty: 112 tablet, Refills: 0    Associated Diagnoses: Chronic pain syndrome      naloxone (NARCAN) 4 MG/0.1ML nasal spray Spray 4 mg into one nostril alternating nostrils as needed for opioid reversal every 2-3 minutes until assistance arrives      !! ondansetron (ZOFRAN-ODT) 8 MG ODT tab Place 8 mg under the tongue every 8 hours as needed       prochlorperazine (COMPAZINE) 5 MG tablet TAKE 1 TABLET BY MOUTH UP TO EVERY 4 HOURS AS NEEDED FOR NAUSEA OR VOMITING      S-Adenosylmethionine (SAME) 400 MG TABS Take 400 mg by mouth daily      SUMAtriptan (IMITREX) 25 MG tablet Take 1 tablet (25 mg) by mouth at onset of headache for migraine May repeat in 2 hours. Max 8 tablets/24 hours.  Qty: 10 tablet, Refills: 0    Associated Diagnoses: Migraine without aura and without status migrainosus, not intractable      triamcinolone (KENALOG) 0.1 % external ointment Apply topically as needed for irritation      UNABLE TO FIND Glycine 500 mg, up to 7657-2266 mg orally at HS      vitamin B-Complex Take 1 tablet by mouth daily      vitamin C (ASCORBIC ACID) 1000 MG TABS Take 1,000 mg by mouth 2 times daily      zolpidem (AMBIEN) 10 MG tablet       polyethylene glycol (MIRALAX) 17 GM/Dose powder take (17G)  by oral route  as needed mixed with 8 oz. water, juice, soda, coffee or tea       !! - Potential  duplicate medications found. Please discuss with provider.        STOP taking these medications       aspirin 81 MG EC tablet Comments:   Reason for Stopping:         celecoxib (CELEBREX) 200 MG capsule Comments:   Reason for Stopping:             Allergies   Allergies   Allergen Reactions    Albuterol      Pt Doesn't Remember, Paresthesia Sensation affected      Turmeric Other (See Comments)    Biaxin [Clarithromycin]     Clavulanic Acid Itching    Clindamycin Phosphate Diarrhea and GI Disturbance     Potentially c.diff    Clorazepate     Penicillins Nausea and Vomiting    Perfume Dizziness, Headache and Cough     swollen nasal passages, post-nasal drip    Plavix [Clopidogrel]      FYI If patient needs in the future: Patient is a 2C19 rapid metabolizer, which would result in more rapid formulation of clopidogrel's active metabolite. Theoretically could increase risk for bleeding but clinical significance may vary    Proventil [Albuterol Sulfate]     Tobramycin Unknown    Amoxicillin Itching and Rash    Tape [Adhesive Tape] Itching and Rash     Needs paper tape    Vancomycin Rash

## 2024-08-17 NOTE — PROVIDER NOTIFICATION
MD Notification    Notified Person: MD    Notified Person Name: Elke Ely    Notification Date/Time: 8/17/24 7176    Notification Interaction: phone call    Purpose of Notification: Pt requested oral Dilaudid for pain.    Orders Received: verbal order: Oral Dilaudid 1 mg or 2 mg q6h PRN.     Comments:

## 2024-08-17 NOTE — PROGRESS NOTES
[facility]  Hospitalist Progress Note    Assessment & Plan   Keshia Arellano is a 63 year old female with PMH significant for hypertension, dyslipidemia, GERD, migraine, cyclical vomiting syndrome, paroxysmal atrial fibrillation (not on anticoagulation), chronic back pain, lumbar spinal stenosis, h/o medical marijuana use who is admitted to neurosurgery for lumbar spinal laminectomy and decompression on 8/16/24.     Hospital service consulted for postop medical management     Lumbar spinal stenosis, s/p L4-5 bilateral laminectomy and medial facetectomy for decompression of central stenosis (8/16/24)  - Postop cares including pain control and DVT prophylaxis per primary service  - Pain Team Following  - Hydroxizine 100mg at hs   - Atarax 25mg q6h prn   - Baclofen 10mg daily prn   - Tylenol 975mg q8h ordered  - Lidocaine patch x2 ordered  - Morphine IR 15mg Q4H PRN (PTA was 15mg QID)   - IV Dilaudid 0.2mg Q4h PRN   - Discharge plans:   - Resume home meds + rest per neurosurgery      Hypertension  Dyslipidemia  Paroxysmal Atrial Fibrillation  PTA on aspirin, lisinopril, Toprol-XL. Has history of atrial fibrillation noted in 2018 during one of the cyclical vomiting episodes and per patient has had no recurrence; not on anticoagulation; takes aspirin daily    - Resume aspirin when okay with surgery    - Continue PTA lisinopril and Toprol-XL with hold parameters  - Continue PTA Lipitor  - Hydralazine IV PRN for SBP> 180    - BP has been WNL   - Did place discharge note to check blood pressure daily before taking medications and to hold BP meds if SBP is less than 100      Depression  PTSD  - Continue PTA Pristiq     Migraine  - PRN Imitrex     H/o cyclical vomiting syndrome  Got a dose of Aprepitant preop  - Can have PRN antiemetics    Clinically Significant Risk Factors Present on Admission                # Drug Induced Platelet Defect: home medication list includes an antiplatelet medication  "  # Hypertension: Home medication list includes antihypertensive(s)         # Obesity: Estimated body mass index is 32.67 kg/m  as calculated from the following:    Height as of this encounter: 1.651 m (5' 5\").    Weight as of this encounter: 89 kg (196 lb 4.8 oz).             Diet: Diet  Regular Diet Adult     DVT Prophylaxis: Defer to primary service   Brown Catheter: Not present  Lines: None     Cardiac Monitoring: None  Code Status: Full Code      Disposition Plan       Expected Discharge Date: 08/18/2024    Discharge Delays: *Early Discharge Anticipated  *Medically Ready for Discharge  Destination: home        Entered: Norma Antonio MD 08/17/2024, 11:33 AM     Care Team Updated: Yes    Disposition: Patient is medically stable to discharge from a hospitalist point of view. Orders have been placed in the discharge navigator. We will continue to follow along while patient is admitted.       Medically Ready for Discharge: Anticipated Today        Norma Antonio MD  Hospitalist Service   [facility]  Securely message with the Vocera Web Console (learn more here)         Medical Decision Making       45 MINUTES SPENT BY ME on the date of service doing chart review, history, exam, documentation & further activities per the note.           Interval History     No acute overnight events.    This morning the patient states that she is doing okay. States that she is frustrated with her pain management. States that her pain was not controlled overnight but is doing better now. Hoping to go home today.     Has had no nausea. Tolerating PO intake.     -Data reviewed today: I reviewed all new labs and imaging results over the last 24 hours.     Physical Exam   Temp: 97.1  F (36.2  C) Temp src: Axillary BP: 95/47 Pulse: 57   Resp: 20 SpO2: 96 % O2 Device: None (Room air)    Vitals:    08/16/24 0558   Weight: 89 kg (196 lb 4.8 oz)     Vital Signs with Ranges  Temp:  [97.1  F (36.2  C)-98.4 "  F (36.9  C)] 97.1  F (36.2  C)  Pulse:  [57-66] 57  Resp:  [18-20] 20  BP: ()/(47-71) 95/47  SpO2:  [95 %-98 %] 96 %  I/O last 3 completed shifts:  In: 1270 [P.O.:470; I.V.:800]  Out: 1675 [Urine:1650; Blood:25]      Constitutional: Awake, alert, cooperative, no apparent distress.    HEENT: PERRL, Normocephalic, without obvious abnormality, atraumatic, oral pharynx with moist mucus membranes  Pulmonary: Clear to auscultation bilaterally, no crackles or wheezing.  Cardiovascular: Regular rate and rhythm, normal S1 and S2  GI: Normal bowel sounds, soft, non-distended, non-tender.  Skin/Integumen: Visualized skin appeared clear.  Neuro: CN II-XII grossly intact.  Psych:  Alert and oriented x 3. Normal affect.  Extremities: No lower extremity edema noted      Medications   Current Facility-Administered Medications   Medication Dose Route Frequency Provider Last Rate Last Admin     Current Facility-Administered Medications   Medication Dose Route Frequency Provider Last Rate Last Admin    acetaminophen (TYLENOL) tablet 975 mg  975 mg Oral Q8H Divina Garcia APRN CNP   975 mg at 08/17/24 0601    atorvastatin (LIPITOR) tablet 20 mg  20 mg Oral At Bedtime [facility] Elke Rose PA-C   20 mg at 08/16/24 2109    desvenlafaxine (PRISTIQ) 24 hr tablet 50 mg  50 mg Oral Daily [facility] Elke Rose PA-C   50 mg at 08/17/24 0850    famotidine (PEPCID) tablet 40 mg  40 mg Oral BID [facility] Elke Rose PA-C   40 mg at 08/17/24 0850    fluticasone (FLONASE) 50 MCG/ACT spray 1 spray  1 spray Both Nostrils [facility]        hydrOXYzine wiley (VISTARIL) capsule 100 mg  100 mg Oral [facility] Bedtime [facility]   100 mg at 08/16/24 2233    lactobacillus rhamnosus (GG) (CULTURELL) capsule 1 capsule  1 capsule Oral BID [facility] Elke Rose PA-C   1 capsule at 08/17/24 0850    Lidocaine (LIDOCARE) 4 % Patch 2 patch  2 patch Transdermal Q24h Divina Garcia, DOMINGA CNP   2 patch at 08/16/24 1907    lisinopril (ZESTRIL) tablet 5 mg   5 mg Oral Daily Dariel Molina MD   5 mg at 08/16/24 1200    metoprolol succinate ER (TOPROL XL) 24 hr tablet 25 mg  25 mg Oral Daily Dariel Molina MD        polyethylene glycol (MIRALAX) Packet 17 g  17 g Oral Daily Divina Garcia APRN CNP   17 g at 08/17/24 0850    sodium chloride (PF) 0.9% PF flush 3 mL  3 mL Intracatheter Q8H Elke Ely PA-C   3 mL at 08/17/24 0216       Data   Recent Labs   Lab 08/17/24  0600 08/16/24  0626   POTASSIUM  --  3.8   CR  --  0.73   * 93   ALBUMIN  --  4.2   PROTTOTAL  --  6.8   BILITOTAL  --  0.4   ALKPHOS  --  108   ALT  --  23   AST  --  23       No results found for this or any previous visit (from the past 24 hour(s)).

## 2024-08-17 NOTE — PLAN OF CARE
Goal Outcome Evaluation:      Plan of Care Reviewed With: patient    Overall Patient Progress: improvingOverall Patient Progress: improving    Outcome Evaluation: Pain slightly control . patient tolerated ambulating well and voided in bathroom.

## 2024-08-17 NOTE — PROGRESS NOTES
Multiple pages regarding this patient     -patient was not happy with only 12 tabs of dilaudid. Pharmacy closed, could not send more. Told her we can refill once she has used them which she was also not happy with since she has no one to drive her. Decided to stay 1 more night  -called again that patient wanted to go home and do pain control herself   -sent script for dilaudid to be picked up MONDAY at the earliest once she has used the 12 tabs being sent with her today   -will have our office coordinate with patient pain team for post op pain control planning on Monday     Elke Xiong PA-C  [facility]  [address] [place] [id number]  Tel [phone]  Fax [phone]  Text page via [facility] Paging/Directory

## 2024-08-17 NOTE — PROGRESS NOTES
Patient ambulation status: inde  Patient able to stand for X-ray:Yes  Standing/upright x-ray complete: Yes  Patient using oral analgesics:yes  Voiding spontaneously:yes  Drains discontinued:yes  Incision clean and dry:yes. New dressing applied  Bowel status:no BM. Miralax given.     Pt A&O x4, VSS.RA. Pain managed with Morphine and Atarax. CMS intact. AVS and med reviewed with pt. All questions answered. Pt discharged home.

## 2024-08-17 NOTE — PLAN OF CARE
Goal Outcome Evaluation:      Plan of Care Reviewed With: patient    Overall Patient Progress: improvingOverall Patient Progress: improving    Outcome Evaluation: pain control . patient tolerated ambulating well.    Summary :08/16/24. [id number].     Patient ambulation status: Up with assist of 1 with gait belt and walker.  Patient able to stand for X-ray:Yes  Standing/upright x-ray complete: No  Patient using oral analgesics:yes  Voiding spontaneously:yes  Drains discontinued:no  Incision clean and dry: Small dry drain noted and marked with no change.   Bowel status: no BM this shift.  Patient is alert and oriented X4. VSS on RA with sat 93-96%. CAPNO in place with WNL.  Patient was delsating at bedtime to 88%, on 1Litres oxygen. Up with assist of 1 with gait belt and walker. On regular diet with good appetite. PIV saline locked. Patient reports pain, prn morphine , atarax and schedule acetaminophen was given . IV dilaudid was given one time  for breakthrough  severe pain . Ice utilized for pain. Patient verbalized satisfaction with level of pain control. CMS intact exp numbness on left feet baseline per patient, slightly improved per patient after surgery. Dressing with small dry drainage marked with no change. Continent of B&B, voided in bathroom.Fall and safety precautions in place. Possible discharge home 08/17/24.

## 2024-08-17 NOTE — PROVIDER NOTIFICATION
MD Notification    Notified Person: MD    Notified Person Name: Elke Ely    Notification Date/Time: 8/17/24 7484    Notification Interaction: phone call    Purpose of Notification: Pt requests more oral Dilaudid dose for discharge. Pharmacy already closed. Pt stated she is not allowed to drive, and nobody is helping her to  med.     Orders Received: Pt can stay one more night. Will discharge tomorrow.    Comments:

## 2024-08-28 ENCOUNTER — DOCUMENTATION ONLY (OUTPATIENT)
Dept: OTHER | Facility: CLINIC | Age: 64
End: 2024-08-28
Payer: COMMERCIAL

## 2024-08-29 ENCOUNTER — ALLIED HEALTH/NURSE VISIT (OUTPATIENT)
Dept: NEUROSURGERY | Facility: CLINIC | Age: 64
End: 2024-08-29
Payer: COMMERCIAL

## 2024-08-29 DIAGNOSIS — Z98.890 S/P SPINAL SURGERY: Primary | ICD-10-CM

## 2024-08-29 PROCEDURE — 99207 PR NO CHARGE NURSE ONLY: CPT

## 2024-08-29 NOTE — PATIENT INSTRUCTIONS
Instructions for Patient    Incision  Staples were removed today   Steri-strips were applied today, they will fall off in 7-10 days. Do not to pull them off.   Keep your incision clean and dry at all times.   It is okay to shower, just pat the incision dry   No submerging incision in water such as pools, hot tubs, or baths for at least 8 weeks and until the incision is healed  Do not apply lotions or ointments to incision    Activity  No lifting greater than 25 pounds. Limited bending, twisting, or overhead reaching.  Walking is the best way to start exercise after surgery. Take short frequent walks. You may gradually increase the distance as tolerated. If you feel pain, decrease your activity, but DO NOT stop walking. Walking will help you gain strength, prevent muscle soreness and spasms, and prevent blood clots.   Avoid bed rest and prolonged sitting for longer than 30 minutes (change positions frequently while awake)  No contact sports or high impact activities such as; running/jogging, snowmobile or 4 ayala riding or any other recreational vehicles until after given clearance at one of your follow up visits    Medications   Refills of pain medication:   Please call the neurosurgery clinic to request 2-3 days before you run out. A nurse will call you back to obtain a pain assessment.   Weaning from narcotic pain medications  When it is time, start weaning by extending the time between doses.   For example, if you're taking 2 tabs every 4 hours, spread it out to 2 tabs over 4.5, 5, 6 hours. At that point you can certainly cut down to 1 tab, then wean to an as needed basis until completely done with them.  Don't take more than 3000mg of Acetaminophen in 24 hours  Ok to begin taking Aspirin and NSAIDs (ex: ibuprofen/Advil)  Encouraged icing for at least 3-4 times throughout the day for 20-30 minutes at a time. Avoid heat to the incision area.   Taking stool softeners regularly can reduce constipation commonly  caused by narcotic pain medications.    Contact clinic or Emergency Room if you develop:   Infection (redness, swelling, warmth, drainage, fever over 101 F)  New injury  Bladder or bowel changes or loss of control    Signs of blood clot:  Swelling and/or warmth in one or both legs  Pain or tenderness in your leg, ankle, foot, or arm   Red or discolored skin     Go to the Emergency Room   If sudden onset of severe headache, weakness, confusion, change in level of consciousness, pain, or loss of movement.  Chest pain  Trouble breathing     Post-operative appointments  6 week and 3 months post-op    Please call and share your concerns regarding pain management with patient relations.     [facility]  [facility]  [address] [place] [address]  Telephone:  [phone]  Fax:  [phone]

## 2024-08-29 NOTE — PROGRESS NOTES
Post-op Nurse Visit:    Patient seen today per the order of  Kem Brooks MD .   DOS: 8/16/24  Procedure: Lumbar 4 to lumbar 5 laminectomy     Pain/Neuro Assessment  7/10 to In incision.   Numbness/tingling: Some numbness still  in feet  Strength: Equal strength to bilateral lower extremities. Denies weakness.     Pain Relief Measures:  Dilaudid: none today. 1 tablet 3-4 times per day   Tylenol: stopped yesterday. Does not work for patient. Will not take   Robaxin: is not taking   Atarax: at HS  Morphine: per chronic pain   Ice: as needed often     Incision  Incision inspected. Edges well-approximated. No redness, swelling, drainage, or warmth noted. Incision prepped with ChloraPrep and staple(s)  removed without difficulty. Steri-strips applied.    Activity  Following restrictions   Falls:  none  Patient is walking occasionally  without difficulty.   Denies redness, swelling, or warmth to bilateral calves.   Wearing brace? No  Using cane prior to surgery     GI/  Difficulty swallowing? No  Patient's appetite is normal  Bowel/bladder problems? Yes diarrhea   Taking stool softeners? Yes     Refills/x-rays/return to work  Refills given at this appointment? No  Sent for x-rays after this appointment? No  Ordered future x-rays? No  Scheduling team notified?   Return to work discussed at this appointment? N/a    Given letter for Dr Brooks with the operative note from her lumbar surgery in 2008 showing she had spinal anesthesia   Will scan fax to HIM to scan into the chart     All of patient's questions addressed today. Patient was instructed to call with any additional questions/concerns.

## 2024-10-03 ENCOUNTER — OFFICE VISIT (OUTPATIENT)
Dept: NEUROSURGERY | Facility: CLINIC | Age: 64
End: 2024-10-03
Payer: COMMERCIAL

## 2024-10-03 ENCOUNTER — TELEPHONE (OUTPATIENT)
Dept: WOUND CARE | Facility: CLINIC | Age: 64
End: 2024-10-03

## 2024-10-03 VITALS — HEART RATE: 63 BPM | DIASTOLIC BLOOD PRESSURE: 80 MMHG | SYSTOLIC BLOOD PRESSURE: 148 MMHG | OXYGEN SATURATION: 96 %

## 2024-10-03 DIAGNOSIS — Z98.890 S/P LAMINECTOMY: Primary | ICD-10-CM

## 2024-10-03 PROCEDURE — 99024 POSTOP FOLLOW-UP VISIT: CPT | Performed by: NURSE PRACTITIONER

## 2024-10-03 RX ORDER — CEPHALEXIN 500 MG/1
500 CAPSULE ORAL 2 TIMES DAILY
Qty: 14 CAPSULE | Refills: 0 | Status: SHIPPED | OUTPATIENT
Start: 2024-10-03 | End: 2024-10-10

## 2024-10-03 ASSESSMENT — PAIN SCALES - GENERAL: PAINLEVEL: MODERATE PAIN (5)

## 2024-10-03 NOTE — NURSING NOTE
"Keshia Arellano is a 64 year old female who presents for:  Chief Complaint   Patient presents with    RECHECK     Incision site has soreness and feels like there's a hole when she touches it. Wondering about lifting restrictions. Pain lvl 5 back/trunk        Initial Vitals:  BP (!) 148/80   Pulse 63   SpO2 96%  Estimated body mass index is 32.67 kg/m  as calculated from the following:    Height as of 8/16/24: 5' 5\" (1.651 m).    Weight as of 8/16/24: 196 lb 4.8 oz (89 kg).. There is no height or weight on file to calculate BSA. BP completed using cuff size: regular  Moderate Pain (5)    Nursing Comments:     Ziggy Pack    "

## 2024-10-03 NOTE — PROGRESS NOTES
[facility] Neurosurgery Follow-Up:     HPI: 7 weeks s/p L4-5 bilateral laminectomy and medial facetectomy with Dr. Brooks. Patient reports intermittent low back pain. She reports some improvement in pre op BLE symptoms. Patient has been following post op activity restrictions. She is scheduled to start post op PT on 10/16.     Current pain management:   Morphine - chronic pain medication - managed by [facility]   Dilaudid - PRN     Exam:  Constitutional:  Alert, well nourished, NAD.  HEENT: Normocephalic, atraumatic.   Pulm:  Without shortness of breath   CV:  No pitting edema of BLE.      BP (!) 148/80   Pulse 63   SpO2 96%     Neurological:  Awake  Alert  Oriented x 3  Motor exam:  Hip Flexor:                Right: 5/5  Left:  5/5  Quadriceps:              Right:  5/5  Left:  5/5  Hamstrings:              Right:  5/5  Left:  5/5  Gastroc Soleus:        Right:  5/5  Left:  5/5  Tib/Ant:                      Right:  5/5  Left:  5/5  EHL:                          Right:  5/5  Left:  5/5      Able to spontaneously move BLE  Sensation intact throughout BLE  Ambulates with cane   Incision: Small area of superficial dehiscence at the middle of the incision. Moderate redness noted around the incision. No drainage, warmth, or swelling.      Assessment:  7 weeks s/p L4-5 bilateral laminectomy and medial facetectomy. Small area of superficial dehiscence at the middle of the incision with moderate redness noted around the incision. No incision drainage, warmth, or swelling. Patient denies any fevers. Nurse covered incision with clean gauze today in clinic and provided wound care instructions.     Plan:   -Referral to Wound Care clinic   -Keflex sent to pharmacy   -Okay to start physical therapy as scheduled   -May gradually increase activity, no lifting more than 50 lbs   -Continue pain control measures as needed   -Follow up at 3 months post op as scheduled    -Call our clinic for any incisional  concerns, increased pain, new symptoms, or any other post operative questions or concerns    Patient voiced understanding and agreement.      Jacy Osborne [facility]  Tel [phone]  Pager [phone]

## 2024-10-03 NOTE — LETTER
10/3/2024      Keshia Arellano  [address]  [place] [address]      Dear Colleague,    Thank you for referring your patient, Keshia Arellano, to the [facility]. Please see a copy of my visit note below.    [facility] Neurosurgery Follow-Up:     HPI: 7 weeks s/p L4-5 bilateral laminectomy and medial facetectomy with Dr. Brooks. Patient reports intermittent low back pain. She reports some improvement in pre op BLE symptoms. Patient has been following post op activity restrictions. She is scheduled to start post op PT on 10/16.     Current pain management:   Morphine - chronic pain medication - managed by [facility]   Dilaudid - PRN     Exam:  Constitutional:  Alert, well nourished, NAD.  HEENT: Normocephalic, atraumatic.   Pulm:  Without shortness of breath   CV:  No pitting edema of BLE.      BP (!) 148/80   Pulse 63   SpO2 96%     Neurological:  Awake  Alert  Oriented x 3  Motor exam:  Hip Flexor:                Right: 5/5  Left:  5/5  Quadriceps:              Right:  5/5  Left:  5/5  Hamstrings:              Right:  5/5  Left:  5/5  Gastroc Soleus:        Right:  5/5  Left:  5/5  Tib/Ant:                      Right:  5/5  Left:  5/5  EHL:                          Right:  5/5  Left:  5/5      Able to spontaneously move BLE  Sensation intact throughout BLE  Ambulates with cane   Incision: Small area of superficial dehiscence at the middle of the incision. Moderate redness noted around the incision. No drainage, warmth, or swelling.      Assessment:  7 weeks s/p L4-5 bilateral laminectomy and medial facetectomy. Small area of superficial dehiscence at the middle of the incision with moderate redness noted around the incision. No incision drainage, warmth, or swelling. Patient denies any fevers. Nurse covered incision with clean gauze today in clinic and provided wound care instructions.     Plan:   -Referral to Wound Care clinic   -Keflex sent  to pharmacy   -Okay to start physical therapy as scheduled   -May gradually increase activity, no lifting more than 50 lbs   -Continue pain control measures as needed   -Follow up at 3 months post op as scheduled    -Call our clinic for any incisional concerns, increased pain, new symptoms, or any other post operative questions or concerns    Patient voiced understanding and agreement.      Jacy Osborne CNP  [facility]  Tel [phone]  Pager [phone]      Again, thank you for allowing me to participate in the care of your patient.        Sincerely,        Jacy Osborne, NP

## 2024-10-03 NOTE — PATIENT INSTRUCTIONS
-Referral to Wound Care clinic   -Keflex sent to pharmacy   -Okay to start physical therapy as scheduled   -May gradually increase activity, no lifting more than 50 lbs   -Continue pain control measures as needed   -Follow up at 3 months post op as scheduled    -Call our clinic for any incisional concerns, increased pain, new symptoms, or any other post operative questions or concerns

## 2024-10-03 NOTE — TELEPHONE ENCOUNTER
Consult received via Workqueue from     Jacy Osborne NP in [facility]    for wound of the back.     Does the patient have an open and draining wound? Yes    Please schedule with Payton Hensley PA-C, Ebonie Trejo NP, Jonathan García M.D., or Allan Jackson M.D. at [facility] [place] for next available appointment.    **For all providers, please schedule a follow up 4 weeks after initial appointment. (2-3 weeks for Dr. Monroe and Dr. Resendez)**  --If unable to schedule within 2-3 weeks then please place on cancellation list--    Is the patient able to make their own medical decisions? Yes    Can the patient be scheduled on a Thursday (Maya/Renetta (starting in November))? Yes    Is patient a FAMILIA lift? PLEASE INQUIRE WHEN MAKING THE APPOINTMENT AND PUT IN APPOINTMENT NOTES    Routing to  Wound Healing Scheduling.

## 2024-10-08 ENCOUNTER — HOSPITAL ENCOUNTER (OUTPATIENT)
Dept: WOUND CARE | Facility: CLINIC | Age: 64
Discharge: HOME | End: 2024-10-08
Attending: PHYSICIAN ASSISTANT | Admitting: PHYSICIAN ASSISTANT
Payer: COMMERCIAL

## 2024-10-08 VITALS — SYSTOLIC BLOOD PRESSURE: 143 MMHG | HEART RATE: 59 BPM | TEMPERATURE: 97 F | DIASTOLIC BLOOD PRESSURE: 74 MMHG

## 2024-10-08 DIAGNOSIS — Z98.890 S/P LAMINECTOMY: ICD-10-CM

## 2024-10-08 DIAGNOSIS — Z98.890 S/P LUMBAR SPINE OPERATION: Primary | ICD-10-CM

## 2024-10-08 PROCEDURE — G0463 HOSPITAL OUTPT CLINIC VISIT: HCPCS | Mod: 25

## 2024-10-08 PROCEDURE — 99203 OFFICE O/P NEW LOW 30 MIN: CPT | Mod: 25 | Performed by: PHYSICIAN ASSISTANT

## 2024-10-08 PROCEDURE — 11042 DBRDMT SUBQ TIS 1ST 20SQCM/<: CPT | Performed by: PHYSICIAN ASSISTANT

## 2024-10-08 NOTE — DISCHARGE INSTRUCTIONS
10/08/2024   Keshia Adan   1960  A DME order for supplies has been placed to [facility] [employer]. If there are any issues with your order including not receiving the order please call [facility] [employer] at [phone]. They can also provide a tracking number for you.    Plan 10/08/2024   Dressing changes performed by Patient  Look for signs of infection: increase temperature, foul drainage, odor; call [facility] or go to ED  Increase Protein Diet: shakes; bars and foods  Shower Ok but do not soak or tub baths     Wound Dressing Change: Lumbar spine   - Prepare clean surface and wash your hands with soap and water  -Cleanse with mild unscented soap and water (such as Cetaphil, Cerave or Dove)   -Primary dressing: apply a dab of Iodosorb gel into wound  -Secondary dressing: cover with 4x4 Zetuvit Plus Silicone with border  Change Daily     Main Provider: Payton Hensley PA-C October 8, 2024    Call us at [phone] if you have any questions about your wounds, if you have redness or swelling around your wound, have a fever of 101 degrees Fahrenheit or greater or if you have any other problems or concerns. We answer the phone Monday through Friday 8 am to 4 pm, please leave a message as we check the voicemail frequently throughout the day. If you have a concern over the weekend, please leave a message and we will return your call Monday. If the need is urgent, go to the ER or urgent care.    If you had a positive experience please indicate that on your patient satisfaction survey form that [facility] will be sending you.  It was a pleasure meeting with you today.  Thank you for allowing me and my team the privilege of caring for you today.  YOU are the reason we are here, and I truly hope we provided you with the excellent service you deserve.  Please let us know if there is anything else we can do for you so that we can be sure you are leaving completely satisfied with your care experience.      If you have any  billing related questions please call the [facility] Business office at [phone]. The clinic staff does not handle billing related matters.  If you are scheduled to have a follow up appointment, you will receive a reminder call the day before your visit. On the appointment day please arrive 15 minutes prior to your appointment time. If you are unable to keep that appointment, please call the clinic to cancel or reschedule. If you are more than 10 minutes late or greater for your scheduled appointment time, the clinic policy is that you may be asked to reschedule.

## 2024-10-08 NOTE — PROGRESS NOTES
Patient arrived for wound care visit. Certified Wound Care Nurse time spent evaluating patient record, completed a full evaluation and documented wound(s) & deepak-wound skin; provided recommendation based on treatment plan. Applied dressing, reviewed discharge instructions, patient education, and discussed plan of care with appropriate medical team staff members and patient and/or family members.

## 2024-10-08 NOTE — PROGRESS NOTES
[facility]    ASSESSMENT:   Dehisced surgical wound of back  (Z98.890) S/P laminectomy    PLAN/DISCUSSION:   Wound care plan: Iodosorb + cover dressing, changed QD. Dressing will be performed by patient See bottom of note for detailed wound care and patient instructions  Dietary recommendations discussed, see AVS     HISTORY OF PRESENT ILLNESS:   Keshia Arellano is a 64 year old female with spinal stenosis who presents today for a dehisced surgical incision of her back. Underwent laminectomy on 8/16/24. Upon follow-up with neurosurgery on 10/3 she was noted to have a small area of dehiscence.     TREATMENT COURSE:  10/8/2024 : Presents for initial visit at our clinic.       MEDICATIONS: reviewed, see med rec for most up to date list, no relevant medications to wound healing    VITALS: There were no vitals taken for this visit.     PHYSICAL EXAM:  GENERAL: Patient is alert and oriented and in no acute distress  INTEGUMENTARY:   Wound (used by OP [facility] only) 10/08/24 1245 lumbar spine surgical (Active)   Thickness/Stage full thickness 10/08/24 1200   Base granulating;slough 10/08/24 1200   Periwound intact 10/08/24 1200   Periwound Temperature warm 10/08/24 1200   Periwound Skin Turgor soft 10/08/24 1200   Edges open;irregular 10/08/24 1200   Length (cm) 0.8 10/08/24 1200   Width (cm) 3.5 10/08/24 1200   Depth (cm) 1 10/08/24 1200   Wound (cm^2) 2.8 cm^2 10/08/24 1200   Wound Volume (cm^3) 2.8 cm^3 10/08/24 1200   Drainage Characteristics/Odor serosanguineous 10/08/24 1200   Drainage Amount moderate 10/08/24 1200   Care, Wound debrided 10/08/24 1200       Incision/Surgical Site 08/16/24 Midline Back (Active)             PROCEDURES: 4% topical lidocaine was applied to the wound bed. Patient was determined to be capable of making their own medical decisions and informed consent was obtained. Using a curette a surgical debridement was performed down to and including subcutaneous tissue of <20cm2.  Hemostasis was achieved with pressure. The patient tolerated the procedure well.      MDM: 30 minutes were spent on the date of the visit reviewing previous chart notes, evaluating patient and developing the treatment plan, this excludes any time spent on procedures    PATIENT INSTRUCTIONS      Further instructions from your care team         10/08/2024   Keshia Arellano   1960  A DME order for supplies has been placed to [employer]. If there are any issues with your order including not receiving the order please call [employer] at [phone]. They can also provide a tracking number for you.    Plan 10/08/2024   Dressing changes performed by Patient  Look for signs of infection: increase temperature, foul drainage, odor; call [facility] or go to ED  Increase Protein Diet: shakes; bars and foods  Shower Ok but do not soak or tub baths     Wound Dressing Change: Lumbar spine   - Prepare clean surface and wash your hands with soap and water  -Cleanse with mild unscented soap and water (such as Cetaphil, Cerave or Dove)   -Primary dressing: apply a dab of Iodosorb gel into wound  -Secondary dressing: cover with 4x4 Zetuvit Plus Silicone with border  Change Daily     Main Provider: Payton Hensley PA-C October 8, 2024    Call us at [phone] if you have any questions about your wounds, if you have redness or swelling around your wound, have a fever of 101 degrees Fahrenheit or greater or if you have any other problems or concerns. We answer the phone Monday through Friday 8 am to 4 pm, please leave a message as we check the voicemail frequently throughout the day. If you have a concern over the weekend, please leave a message and we will return your call Monday. If the need is urgent, go to the ER or urgent care.    If you had a positive experience please indicate that on your patient satisfaction survey form that [facility] will be sending you.  It was a pleasure meeting with you today.  Thank you for  allowing me and my team the privilege of caring for you today.  YOU are the reason we are here, and I truly hope we provided you with the excellent service you deserve.  Please let us know if there is anything else we can do for you so that we can be sure you are leaving completely satisfied with your care experience.      If you have any billing related questions please call the [facility] Business office at [phone]. The clinic staff does not handle billing related matters.  If you are scheduled to have a follow up appointment, you will receive a reminder call the day before your visit. On the appointment day please arrive 15 minutes prior to your appointment time. If you are unable to keep that appointment, please call the clinic to cancel or reschedule. If you are more than 10 minutes late or greater for your scheduled appointment time, the clinic policy is that you may be asked to reschedule.            Electronically signed by Payton Hensley PA-C on October 8, 2024

## 2024-10-10 ENCOUNTER — TELEPHONE (OUTPATIENT)
Dept: WOUND CARE | Facility: CLINIC | Age: 64
End: 2024-10-10
Payer: COMMERCIAL

## 2024-10-10 NOTE — TELEPHONE ENCOUNTER
Patient called to follow up on DME order.  Her supplies have not arrived and she is wondering what she should do with changing her dressings.

## 2024-10-11 NOTE — TELEPHONE ENCOUNTER
Call to [employer] who states they spoke to the Patient late yesterday regarding the order for Zetuvit. Order updated by [employer] and Patient was informed she could  supplies on Friday.   Call back to Patient but unable to leave a message.

## 2024-10-11 NOTE — ADDENDUM NOTE
Encounter addended by: Barbara Morales RN on: 10/11/2024 9:41 AM   Actions taken: Order list changed, Diagnosis association updated

## 2024-10-18 ENCOUNTER — TELEPHONE (OUTPATIENT)
Dept: NEUROSURGERY | Facility: CLINIC | Age: 64
End: 2024-10-18
Payer: COMMERCIAL

## 2024-10-18 NOTE — TELEPHONE ENCOUNTER
Spoke with patient. Offered appointment with Jacy Osborne on 11/21 in [place]. Patient stated that she cannot travel to [place], she needs either [place] or [place] location. No availability in both locations with Jacy. Patient agreed to keep her appt with Dr. Brooks.

## 2024-10-18 NOTE — TELEPHONE ENCOUNTER
[facility] Call Center    Phone Message    May a detailed message be left on voicemail: yes     Reason for Call: Other: Patient called and requested to switch her 12 week post-op on 11/21 to Jacy Osborne instead of Dr Brooks. Patient stated she would be ok going to either [place] or [place] if she is able to switch. Please call patient to advise when available.      Action Taken: Message routed to:  Other: Neurosurgery    Travel Screening: Not Applicable     Date of Service:

## 2024-12-17 ENCOUNTER — TELEPHONE (OUTPATIENT)
Dept: NEUROSURGERY | Facility: CLINIC | Age: 64
End: 2024-12-17
Payer: COMMERCIAL

## 2024-12-17 NOTE — TELEPHONE ENCOUNTER
"Per hospitalist on 8/17/24 \"H/o cyclical vomiting syndrome  -got a dose of Aprepitant preop  -can have PRN antiemetics\".    Per chart review this medication was given by pre-op/anaesthesia team with instructions \" For patients with a history of uncontrolled nausea/vomiting postoperatively. Give on arrival to Pre Op\".   Date/Time Action Taken User Additional Information   08/16/24 0628 Sign Kee Bernal MD    08/16/24 0628 Rx Verify  Auto Verification   08/16/24 0600 Complete Gemma Thompson RN    08/16/24 0996 Acknowledge Danielle Hodge, RN New Order       Returned call. Reviewed this was not a medication ordered/prescribed by neurosurgical team. They verbalized understanding.       "

## 2024-12-17 NOTE — TELEPHONE ENCOUNTER
[facility]    Phone Message    May a detailed message be left on voicemail: yes     Reason for Call: Other: Melani from [employer] called stating they have a prior authorization case started for Aprepitant 40 MG, she states she needs a call back to review questions. Please review and call Melani at [phone]. Case # [id number]     Action Taken: Message routed to:  Other: [facility]    Travel Screening: Not Applicable     Date of Service:                                                                      Order for ultrasound faxed.

## 2025-06-10 NOTE — TELEPHONE ENCOUNTER
Central PA team  [phone]  Pool: [id number] PA MED ([id number])          PA has been initiated.       PA form completed and faxed insurance via [employer]     Key:  [id number]     Medication:  Morphine Sulfate ER 15MG er tablets      Insurance:  [employer]         Response will be received via fax and may take up to 5-10 business days depending on plan         68

## (undated) DEVICE — DRAPE COVER C-ARM SEAMLESS SNAP-KAP 03-KP26 LATEX FREE

## (undated) DEVICE — PACK SPINE SM CUSTOM SNE15SSFSK

## (undated) DEVICE — GLOVE BIOGEL PI MICRO SZ 7.5 48575

## (undated) DEVICE — DRAPE MICROSCOPE LEICA 54X150" AR8033650

## (undated) DEVICE — POSITIONER PT PRONESAFE HEAD REST W/DERMAPROX INSERT 40599

## (undated) DEVICE — LINEN TOWEL PACK X5 5464

## (undated) DEVICE — SYR EAR BULB 3OZ 0035830

## (undated) DEVICE — SOL WATER IRRIG 1000ML BOTTLE 2F7114

## (undated) DEVICE — DRSG KERLIX FLUFFS X5

## (undated) DEVICE — STPL SKIN 35W 6.9MM  PXW35

## (undated) DEVICE — ADH LIQUID MASTISOL TOPICAL VIAL 2-3ML 0523-48

## (undated) DEVICE — SU VICRYL 0 CT-2 CR 8X18" J727D

## (undated) DEVICE — DRAPE MAYO STAND 23X54 8337

## (undated) DEVICE — TOOL DISSECT MIDAS MR8 14CM MATCH HEAD 3MM MR8-14MH30

## (undated) DEVICE — GLOVE BIOGEL PI MICRO INDICATOR UNDERGLOVE SZ 8.0 48980

## (undated) DEVICE — SU MONOCRYL 4-0 PS-2 18" UND Y496G

## (undated) DEVICE — ESU ELEC BLADE 2.75" COATED/INSULATED E1455

## (undated) DEVICE — SU VICRYL 2-0 CT-2 CR 8X18" J726D

## (undated) DEVICE — SPONGE SURGIFOAM 50

## (undated) DEVICE — NDL SPINAL 18GA 3.5" 405184

## (undated) DEVICE — ESU PENCIL W/HOLSTER E2350H

## (undated) DEVICE — RX SURGIFLO HEMOSTATIC MATRIX W/THROMBIN 8ML 2994

## (undated) DEVICE — ESU GROUND PAD UNIVERSAL W/O CORD

## (undated) RX ORDER — PROPOFOL 10 MG/ML
INJECTION, EMULSION INTRAVENOUS
Status: DISPENSED
Start: 2024-08-16

## (undated) RX ORDER — ACETAMINOPHEN 325 MG/1
TABLET ORAL
Status: DISPENSED
Start: 2024-08-16

## (undated) RX ORDER — CLINDAMYCIN PHOSPHATE 900 MG/50ML
INJECTION, SOLUTION INTRAVENOUS
Status: DISPENSED
Start: 2024-08-16

## (undated) RX ORDER — DEXMEDETOMIDINE HYDROCHLORIDE 4 UG/ML
INJECTION, SOLUTION INTRAVENOUS
Status: DISPENSED
Start: 2024-08-16

## (undated) RX ORDER — BUPIVACAINE HYDROCHLORIDE AND EPINEPHRINE 5; 5 MG/ML; UG/ML
INJECTION, SOLUTION EPIDURAL; INTRACAUDAL; PERINEURAL
Status: DISPENSED
Start: 2024-08-16

## (undated) RX ORDER — APREPITANT 40 MG/1
CAPSULE ORAL
Status: DISPENSED
Start: 2024-08-16

## (undated) RX ORDER — DEXAMETHASONE SODIUM PHOSPHATE 4 MG/ML
INJECTION, SOLUTION INTRA-ARTICULAR; INTRALESIONAL; INTRAMUSCULAR; INTRAVENOUS; SOFT TISSUE
Status: DISPENSED
Start: 2024-08-16

## (undated) RX ORDER — FENTANYL CITRATE 50 UG/ML
INJECTION, SOLUTION INTRAMUSCULAR; INTRAVENOUS
Status: DISPENSED
Start: 2024-08-16

## (undated) RX ORDER — FENTANYL CITRATE 0.05 MG/ML
INJECTION, SOLUTION INTRAMUSCULAR; INTRAVENOUS
Status: DISPENSED
Start: 2024-08-16

## (undated) RX ORDER — METHYLPREDNISOLONE ACETATE 40 MG/ML
INJECTION, SUSPENSION INTRA-ARTICULAR; INTRALESIONAL; INTRAMUSCULAR; SOFT TISSUE
Status: DISPENSED
Start: 2024-08-16

## (undated) RX ORDER — HYDROMORPHONE HYDROCHLORIDE 1 MG/ML
INJECTION, SOLUTION INTRAMUSCULAR; INTRAVENOUS; SUBCUTANEOUS
Status: DISPENSED
Start: 2024-08-16

## (undated) RX ORDER — ONDANSETRON 2 MG/ML
INJECTION INTRAMUSCULAR; INTRAVENOUS
Status: DISPENSED
Start: 2024-08-16